# Patient Record
Sex: FEMALE | Race: WHITE | Employment: OTHER | URBAN - METROPOLITAN AREA
[De-identification: names, ages, dates, MRNs, and addresses within clinical notes are randomized per-mention and may not be internally consistent; named-entity substitution may affect disease eponyms.]

---

## 2017-01-03 DIAGNOSIS — E78.5 HYPERLIPIDEMIA LDL GOAL <100: Primary | ICD-10-CM

## 2017-01-03 RX ORDER — SIMVASTATIN 20 MG
20 TABLET ORAL AT BEDTIME
Qty: 30 TABLET | Refills: 0 | Status: SHIPPED | OUTPATIENT
Start: 2017-01-03 | End: 2017-02-15

## 2017-01-03 NOTE — TELEPHONE ENCOUNTER
Simvastatin     Last Written Prescription Date: 06/23/2016  Last Fill Quantity: 90, # refills: 1  Last Office Visit with [facility], P or [facility] prescribing provider: 12/21/2016   Next 5 appointments (look out 90 days)     Jan 24, 2017 11:00 AM   Return Visit with SELWYN Gaona CNP   [facility] ([facility])    [address]  [facility] [address]  [facility] [address]   [phone]                   CHOL      155   10/20/2015  HDL       58   10/20/2015  LDL       76   10/20/2015  LDL      149   10/29/2013  TRIG      107   10/20/2015  CHOLHDLRATIO      2.7   10/20/2015      Luan DYSON (R)

## 2017-01-04 ENCOUNTER — HOSPITAL ENCOUNTER (OUTPATIENT)
Dept: OCCUPATIONAL THERAPY | Facility: CLINIC | Age: 62
Setting detail: THERAPIES SERIES
End: 2017-01-04
Attending: NURSE PRACTITIONER
Payer: OTHER MISCELLANEOUS

## 2017-01-04 PROCEDURE — 97022 WHIRLPOOL THERAPY: CPT | Mod: GO | Performed by: OCCUPATIONAL THERAPIST

## 2017-01-04 PROCEDURE — 97110 THERAPEUTIC EXERCISES: CPT | Mod: GO | Performed by: OCCUPATIONAL THERAPIST

## 2017-01-04 PROCEDURE — 40000839 ZZH STATISTIC HAND THERAPY VISIT: Performed by: OCCUPATIONAL THERAPIST

## 2017-01-06 ENCOUNTER — HOSPITAL ENCOUNTER (OUTPATIENT)
Dept: OCCUPATIONAL THERAPY | Facility: CLINIC | Age: 62
Setting detail: THERAPIES SERIES
End: 2017-01-06
Attending: NURSE PRACTITIONER
Payer: OTHER MISCELLANEOUS

## 2017-01-06 ENCOUNTER — OFFICE VISIT (OUTPATIENT)
Dept: PALLIATIVE MEDICINE | Facility: CLINIC | Age: 62
End: 2017-01-06
Payer: OTHER MISCELLANEOUS

## 2017-01-06 DIAGNOSIS — G90.511 COMPLEX REGIONAL PAIN SYNDROME TYPE 1 OF RIGHT UPPER EXTREMITY: Primary | ICD-10-CM

## 2017-01-06 PROCEDURE — 97110 THERAPEUTIC EXERCISES: CPT | Mod: GO | Performed by: OCCUPATIONAL THERAPIST

## 2017-01-06 PROCEDURE — 40000839 ZZH STATISTIC HAND THERAPY VISIT: Performed by: OCCUPATIONAL THERAPIST

## 2017-01-06 PROCEDURE — 97022 WHIRLPOOL THERAPY: CPT | Mod: GO | Performed by: OCCUPATIONAL THERAPIST

## 2017-01-06 PROCEDURE — 96152 HC HEALTH AND BEHAVIOR INTERVENTION, INDIVIDUAL, EACH 15 MINUTES: CPT | Performed by: PSYCHOLOGIST

## 2017-01-06 NOTE — PROGRESS NOTES
[facility]    [facility] [place]  Behavioral Medicine Visit    Patient Name: Diana Buenrostro     YOB: 1955   Medical Record Number: [id number]  Date: 1/6/2016                SUBJECTIVE:  Session objective: Reports her pain has been up and down as has her mood. We spent time talking about possibilities for coping when patient let me know mostly she wanted to say things were hard right now. We agreed when meeting again we will start where she thinks she is before attempting to problem solve or consider possible ways to cope.    Cites example of  telling her he wanted to get vacation plans going and she was overwhelmed because things felt so uncertain. She is sad there is so much variability and appears to want validation her process with these questions in normal. I have let her know it is different for all and what she is experiencing now is not unusual    OBJECTIVE:              Length of Visit: 45 minutes                 Mental status: healthy, alert, moderate distress and fatigued    ASSESSMENT:   Axis I:  Complex regional pain syndrome type 1 of right upper extremity                    Axis II:   Dx deferred    Progress toward goals: fair.    Has continued to reach out but also has great reluctance and when she experiences a new sensation related to pain she tends to keep it to herself and dwells and has many difficult emotions              Pain Status: unchanged                      Emotional Status: variable              Medication / chemical use concerns: None    PLAN:    Next Appointment: Diana Buenrostro will schedule a follow-up appointment in 2-3 weeks.  Assignment: None  Objectives / interventions for next session: Continue with integrating pain modulation approaches, continue to normalize coping process      Pj Juarez MA, LP, [facility]  Pain Specialist  [facility] [place]

## 2017-01-12 ENCOUNTER — RADIOLOGY INJECTION OFFICE VISIT (OUTPATIENT)
Dept: PALLIATIVE MEDICINE | Facility: CLINIC | Age: 62
End: 2017-01-12
Payer: OTHER MISCELLANEOUS

## 2017-01-12 ENCOUNTER — RADIANT APPOINTMENT (OUTPATIENT)
Dept: RADIOLOGY | Facility: CLINIC | Age: 62
End: 2017-01-12
Attending: ANESTHESIOLOGY
Payer: COMMERCIAL

## 2017-01-12 VITALS — OXYGEN SATURATION: 100 % | DIASTOLIC BLOOD PRESSURE: 76 MMHG | SYSTOLIC BLOOD PRESSURE: 135 MMHG | HEART RATE: 71 BPM

## 2017-01-12 DIAGNOSIS — G90.511 COMPLEX REGIONAL PAIN SYNDROME TYPE 1 OF RIGHT UPPER EXTREMITY: Primary | ICD-10-CM

## 2017-01-12 DIAGNOSIS — G90.511 COMPLEX REGIONAL PAIN SYNDROME TYPE 1 OF RIGHT UPPER EXTREMITY: ICD-10-CM

## 2017-01-12 PROCEDURE — 64510 N BLOCK STELLATE GANGLION: CPT | Mod: RT | Performed by: ANESTHESIOLOGY

## 2017-01-12 ASSESSMENT — PAIN SCALES - GENERAL: PAINLEVEL: MODERATE PAIN (4)

## 2017-01-12 NOTE — NURSING NOTE
"Chief Complaint   Patient presents with     Pain       Initial /75 mmHg  Pulse 72 Estimated body mass index is 24.65 kg/(m^2) as calculated from the following:    Height as of 11/17/16: 1.753 m (5' 9\").    Weight as of 12/21/16: 75.751 kg (167 lb).  BP completed using cuff size: regular    Injection intake:    If this procedure is requiring IV sedation has patient been NPO for 6  Hours? NA    Is patient on coumadin, plavix or other prescribed blood thinner?   No    If patient is on coumadin was it held for 5 days?   NA    If patient is on plavix was it held for 7 days?    NA     Does patient take aspirin?  No    If this is for a cervical procedure and patient is on aspirin has it been held for 6 days?   NA    Any allergies to contrast dye, iodine, steroid and/or numbing medications?  NO    Is patient currently taking antibiotics or have an active infection?  NO    Does patient have a ? Yes       Is patient pregnant or breastfeeding?  NO    Are the vital signs normal?  Yes    Kellie Khoury CMA (AAMA)        "

## 2017-01-12 NOTE — PATIENT INSTRUCTIONS
[facility]   Procedure Discharge Instructions    Today you saw:  Dr. Gaurang Pena      You had an: R-sided Stellate ganglion block     Medications used:  Lidocaine   Bupivacaine   Dexamethasone Omnipaque      Stellate Ganglion Block:  You may have one or more of the following: difficulty swallowing, hoarseness, sore throat, mild swelling or bruising or the neck, drooping or redness of the eyelid on the side injected, a very small pupil on the side injected, weakness of the arm on the side injected.  These symptons should go away within 24 hours  If you experience chest pain, shortness of breath or problems breathing go to your local Emergency Room    Do not eat until the lump in your throat goes down (usually about 4 hours)  Do not drink hot drinks for the next 12 hours  You may take small sips of cool drinks or ice chips  Use a sling to protect your arm if needed          Be cautious when walking. Numbness and/or weakness in the lower extremities may occur up to 6-8 hours after the procedure due to effect of the local anesthetic    Do not drive for 6 hours. The effect of the local anesthetic could slow your reflexes.     You may resume your regular activities after 24 hours    Avoid strenuous activity for the first 24 hours    You may shower, however avoid swimming, tub baths or hot tubs for 24 hours following your procedure    You may have a mild to moderate increase in pain for several days following the injection.    It may take up to 14 days for the steroid medication to start working although you may feel the effect as early as a few days after the procedure.       You may use ice packs for 10-15 minutes, 3 to 4 times a day at the injection site for comfort    Do not use heat to painful areas for 6 to 8 hours. This will give the local anesthetic time to wear off and prevent you from accidentally burning your skin.     You may use anti-inflammatory medications (such as Ibuprofen or  Aleve or Advil) or Tylenol for pain control if necessary    If you were fasting, you may resume your normal diet and medications after the procedure    If you have diabetes, check your blood sugar more frequently than usual as your blood sugar may be higher than normal for 10-14 days following a steroid injection. Contact your doctor who manages your diabetes if your blood sugar is higher than usual    If you experience any of the following, call the pain center nursing line during work hours at [phone] or the after hours provider line at [phone]:  -Fever over 100 degree F  -Swelling, bleeding, redness, drainage, warmth at the injection site  -Progressive weakness or numbness in your legs or arms  -Loss of bowel or bladder function  -Unusual headache that is not relieved by Tylenol  -Unusual new onset of pain that is not improving      Phone #s:  Appointment line: [phone];  Nurse line: [phone]

## 2017-01-12 NOTE — PROGRESS NOTES
Pre procedure Diagnosis: CRPS of the right upper extremity, chronic pain syndrome  Post proceudure Diagnosis:Same  Procedure performed: Stellate ganglion block on the right at C7 under fluorsoscopic guidance, contrast controlled  Anesthesia: local  Complications: none  Operators: Gaurang Pena MD    INDICATIONS:    Diana Buenrostro is a 61 year old female with a h/o chronic right upper extremity pain with CRPS after a distal radius fracture.  Her last Stellate Ganglion Block was performed on 12/9/16 with significant improvement in the hypersensitivity, color, and temperature of the right wrist and hand.  She presents today for repeat Stellate Ganglion block.      Options, benefits and risks were discussed with the patient including bleeding, infection, hoarseness, droopy face/eye, swallowing problems, no pain relief, seizure, stroke, paralysis, nerve injury, spinal cord injury, spinal headache, spinal fluid leak, coma, death. Questions were answered to her satisfaction and she agrees to proceed. Voluntary informed consent was obtained and signed.  The patient's medical history, medications, and allergies were reviewed and reconciled.    Vitals:  /76 mmHg  Pulse 71  SpO2 100%  Medications reviewed?  Yes  Allergies reviewed?  yes  Pre-procedure pain:  5/10    PROCEDURE IN DETAIL:  After obtaining signed informed consent the patient was brought into the procedure suit and placed in a supine position on the procedure table. Patient's neck area was prepped and draped in the usual sterile fashion. The transverse process of C7 on the right side was identified under AP fluoroscopic guidance. Soft tissue of her neck were retracted using my left hand until palpation of transverse process was obtained. 1 ml of 1% lidocaine was injected at the needle entry point and needle tract using a 30 gauge 1 inch needle. Then a 22 gauge 2 inch stellate needle was inserted and slowly advanced under fluoroscopic guidance until  bony contact was made at the site where the transverse process meets the body of C7. Lateral fluoroscopic guidance was also obtained to confirm the needle depth and position. After negative aspiration for heme and CSF, 1 cc of Omnipaque contrast dye was injected utilizing real time fluoroscopy that was negative for vascular uptake and showed excellent spread of  contrast along the anterior aspect of the C7 and T1 vertebral bodies on the right.  Omnipaque wasted:  9 ml.      Then, after negative aspiration, a test dose was performed by injecting 1.5 ml of Lidocaine 1% with epinephrine which was negative for adverse effects.  Then, after repeated negative aspiration, 8 ml of a combination of Depomedrol 40 mg and 7 ml of 0.2% Ropivicaine was injected slowly and incrementally with frequent intermittent aspirations. The needle was then flushed with Lidocaine 1% as it was withdrawn, the patient's neck was cleansed and a sterile dressing was applied.  The patient was brought to the recovery area.     In the recovery area patient reported no significant pain relief but she had hyperemia of the right upper extremity and a positive Lili's syndrome which is an indication of a successful stellate ganglion block.     Patient's pre procedure  pain intensity score was 5/10 and post procedure pain intensity score was 5/10. Patient will continue monitoring progress and will be followed in the pain clinic by SELWYN Ferguson as scheduled.  The patient was then discharged in good condition with discharge instructions.    Follow up includes:  - post procedure nurse call at one week  - follow up in the pain clinic as scheduled    Gaurang Pena MD  [facility] [place]

## 2017-01-12 NOTE — MR AVS SNAPSHOT
After Visit Summary   1/12/2017    Diana Buenrostro    MRN: [id number]           Patient Information     Date Of Birth          1955        Visit Information        Provider Department      1/12/2017 2:15 PM Gaurang Tolentino MD [facility]        Care Instructions    [facility]   Procedure Discharge Instructions    Today you saw:  Dr. Gaurang Pena      You had an: R-sided Stellate ganglion block     Medications used:  Lidocaine   Bupivacaine   Dexamethasone Omnipaque      Stellate Ganglion Block:  You may have one or more of the following: difficulty swallowing, hoarseness, sore throat, mild swelling or bruising or the neck, drooping or redness of the eyelid on the side injected, a very small pupil on the side injected, weakness of the arm on the side injected.  These symptons should go away within 24 hours  If you experience chest pain, shortness of breath or problems breathing go to your local Emergency Room    Do not eat until the lump in your throat goes down (usually about 4 hours)  Do not drink hot drinks for the next 12 hours  You may take small sips of cool drinks or ice chips  Use a sling to protect your arm if needed          Be cautious when walking. Numbness and/or weakness in the lower extremities may occur up to 6-8 hours after the procedure due to effect of the local anesthetic    Do not drive for 6 hours. The effect of the local anesthetic could slow your reflexes.     You may resume your regular activities after 24 hours    Avoid strenuous activity for the first 24 hours    You may shower, however avoid swimming, tub baths or hot tubs for 24 hours following your procedure    You may have a mild to moderate increase in pain for several days following the injection.    It may take up to 14 days for the steroid medication to start working although you may feel the effect as early as a few days after the procedure.       You may use ice  packs for 10-15 minutes, 3 to 4 times a day at the injection site for comfort    Do not use heat to painful areas for 6 to 8 hours. This will give the local anesthetic time to wear off and prevent you from accidentally burning your skin.     You may use anti-inflammatory medications (such as Ibuprofen or Aleve or Advil) or Tylenol for pain control if necessary    If you were fasting, you may resume your normal diet and medications after the procedure    If you have diabetes, check your blood sugar more frequently than usual as your blood sugar may be higher than normal for 10-14 days following a steroid injection. Contact your doctor who manages your diabetes if your blood sugar is higher than usual    If you experience any of the following, call the pain center nursing line during work hours at [phone] or the after hours provider line at [phone]:  -Fever over 100 degree F  -Swelling, bleeding, redness, drainage, warmth at the injection site  -Progressive weakness or numbness in your legs or arms  -Loss of bowel or bladder function  -Unusual headache that is not relieved by Tylenol  -Unusual new onset of pain that is not improving      Phone #s:  Appointment line: [phone];  Nurse line: [phone]            Follow-ups after your visit        Your next 10 appointments already scheduled     Jan 13, 2017  9:00 AM   Treatment with Keri Ramsey OT   [facility] ([facility])    [address]  [facility] [address]   [phone]            Jan 18, 2017  9:00 AM   Treatment with Keri Ramsey OT   [facility] ([facility])    [address]  [facility] [address]   [phone]            Jan 19, 2017  1:00 PM   Return Visit with Pj Juarez LP   [facility] [place] ([facility] [place])    [address]  [place] [id number] [phone]            Jan  20, 2017  9:00 AM   Treatment with Keri Ramsey, OT   [facility] ([facility])    [address] [place] [address]  [address]  [facility] [address]   [phone]            Jan 24, 2017 11:00 AM   Return Visit with SELWYN Gaona CNP   [facility] ([facility])    [address]  [address] [address]  [facility] [address] [phone]              Who to contact     If you have questions or need follow up information about today's clinic visit or your schedule please contact [facility] directly at [phone].  Normal or non-critical lab and imaging results will be communicated to you by MyChart, letter or phone within 4 business days after the clinic has received the results. If you do not hear from us within 7 days, please contact the clinic through [employer] or phone. If you have a critical or abnormal lab result, we will notify you by phone as soon as possible.  Submit refill requests through [employer] or call your pharmacy and they will forward the refill request to us. Please allow 3 business days for your refill to be completed.          Additional Information About Your Visit        MyChart Information     [employer] gives you secure access to your electronic health record. If you see a primary care provider, you can also send messages to your care team and make appointments. If you have questions, please call your primary care clinic.  If you do not have a primary care provider, please call [phone] and they will assist you.        Care EveryWhere ID     This is your Care EveryWhere ID. This could be used by other organizations to access your [place] medical records  [id number]        Your Vitals Were     Pulse                   72            Blood Pressure from Last 3 Encounters:   01/12/17 120/75   12/21/16 121/73   12/13/16 143/84    Weight from Last 3 Encounters:   12/21/16 75.751 kg (167 lb)   11/30/16 78.019 kg (172 lb)    11/17/16 77.111 kg (170 lb)              Today, you had the following     No orders found for display       Primary Care Provider Office Phone # Fax #    SELWYN Cifuentes [facility] [phone]       [facility] [address] [facility] [facility] [address] [facility] [address]        Thank you!     Thank you for choosing [facility]  for your care. Our goal is always to provide you with excellent care. Hearing back from our patients is one way we can continue to improve our services. Please take a few minutes to complete the written survey that you may receive in the mail after your visit with us. Thank you!             Your Updated Medication List - Protect others around you: Learn how to safely use, store and throw away your medicines at www.disposemymeds.org.          This list is accurate as of: 1/12/17  2:16 PM.  Always use your most recent med list.                   Brand Name Dispense Instructions for use    diclofenac 1 % Gel topical gel    VOLTAREN    100 g    Apply 2-4 grams to affected area four times daily       gabapentin 300 MG capsule    NEURONTIN    180 capsule    600 mg TID       HYDROcodone-acetaminophen 5-325 MG per tablet    NORCO    20 tablet    1/2 to 1 tablet daily as needed for moderate to severe pain. #20 tabs to last 30 days.       levothyroxine 50 MCG tablet    SYNTHROID/LEVOTHROID    30 tablet    Take 50 mcg on even days, and 25 mcg (1/2 tab) on odd days. (Needs follow-up appointment for this medication)       lidocaine 5 % ointment    XYLOCAINE    100 g    Apply topically 4 times daily as needed for moderate pain       simvastatin 20 MG tablet    ZOCOR    30 tablet    Take 1 tablet (20 mg) by mouth At Bedtime (Needs fasting lab work)       VITAMIN C PO      Take 500 mg by mouth daily

## 2017-01-13 ENCOUNTER — HOSPITAL ENCOUNTER (OUTPATIENT)
Dept: OCCUPATIONAL THERAPY | Facility: CLINIC | Age: 62
Setting detail: THERAPIES SERIES
End: 2017-01-13
Attending: NURSE PRACTITIONER
Payer: OTHER MISCELLANEOUS

## 2017-01-13 PROCEDURE — 97110 THERAPEUTIC EXERCISES: CPT | Mod: GO | Performed by: OCCUPATIONAL THERAPIST

## 2017-01-13 PROCEDURE — 97022 WHIRLPOOL THERAPY: CPT | Mod: GO | Performed by: OCCUPATIONAL THERAPIST

## 2017-01-13 PROCEDURE — 40000839 ZZH STATISTIC HAND THERAPY VISIT: Performed by: OCCUPATIONAL THERAPIST

## 2017-01-18 ENCOUNTER — HOSPITAL ENCOUNTER (OUTPATIENT)
Dept: OCCUPATIONAL THERAPY | Facility: CLINIC | Age: 62
Setting detail: THERAPIES SERIES
End: 2017-01-18
Attending: NURSE PRACTITIONER
Payer: OTHER MISCELLANEOUS

## 2017-01-18 PROCEDURE — 97112 NEUROMUSCULAR REEDUCATION: CPT | Mod: GO | Performed by: OCCUPATIONAL THERAPIST

## 2017-01-18 PROCEDURE — 40000839 ZZH STATISTIC HAND THERAPY VISIT: Performed by: OCCUPATIONAL THERAPIST

## 2017-01-18 PROCEDURE — 97110 THERAPEUTIC EXERCISES: CPT | Mod: GO | Performed by: OCCUPATIONAL THERAPIST

## 2017-01-19 ENCOUNTER — OFFICE VISIT (OUTPATIENT)
Dept: PALLIATIVE MEDICINE | Facility: CLINIC | Age: 62
End: 2017-01-19
Payer: OTHER MISCELLANEOUS

## 2017-01-19 VITALS
WEIGHT: 170 LBS | BODY MASS INDEX: 25.09 KG/M2 | SYSTOLIC BLOOD PRESSURE: 146 MMHG | DIASTOLIC BLOOD PRESSURE: 87 MMHG | HEART RATE: 73 BPM

## 2017-01-19 DIAGNOSIS — G90.511 COMPLEX REGIONAL PAIN SYNDROME TYPE 1 OF RIGHT UPPER EXTREMITY: Primary | ICD-10-CM

## 2017-01-19 DIAGNOSIS — M62.838 MUSCLE SPASM: Primary | ICD-10-CM

## 2017-01-19 PROCEDURE — 96152 HC HEALTH AND BEHAVIOR INTERVENTION, INDIVIDUAL, EACH 15 MINUTES: CPT | Performed by: PSYCHOLOGIST

## 2017-01-19 PROCEDURE — 99214 OFFICE O/P EST MOD 30 MIN: CPT | Performed by: NURSE PRACTITIONER

## 2017-01-19 RX ORDER — METHOCARBAMOL 500 MG/1
500-1000 TABLET, FILM COATED ORAL 4 TIMES DAILY PRN
Qty: 120 TABLET | Refills: 1 | Status: SHIPPED | OUTPATIENT
Start: 2017-01-19 | End: 2017-08-01

## 2017-01-19 ASSESSMENT — PAIN SCALES - GENERAL: PAINLEVEL: SEVERE PAIN (6)

## 2017-01-19 NOTE — MR AVS SNAPSHOT
After Visit Summary   2017    Diana Buenrostro    MRN: [id number]           Patient Information     Date Of Birth          1955        Visit Information        Provider Department      2017 11:00 AM Mallory Mayo APRN CNP [facility]        Today's Diagnoses     Muscle spasm    -  1       Care Instructions    After Visit Instructions:     Thank you for coming to [facility] for your care. It is my goal to partner with you to help you reach your optimal state of health.     I am recommending multidisciplinary care at this time.  The focus of care will be to continue gradual rehabilitation and pain management with medication adjustments as needed.    Continue daily self-care, identifying contributing factors, and monitoring variations in pain level. Continue to integrate self-care into your life.      1. Schedule occupational/hand therapy visits  2. Schedule follow-up with SELWYN Ferguson NP-C in 4-6 weeks  3. Call Dr Curtis to have the swelling in your left wrist evaluated.   4. Procedures recommended: Call or send message in 2 weeks to discuss add'l block injections.    Interventional procedures  are done at our [place] and [place] locations.   5. Medication recommendations:   1. Methocarbamol 500 m-2 tablets up to 4 times daily for spasms    SELWYN Romo NP-C  [facility]    Contact information: [facility]    Please call if any side effects, questions, or concerns arise.    Nurse Triage line:  [phone]   Call this number with any questions or concerns. You may leave a detailed message anytime. Calls are typically returned Monday through Friday between 8 AM and 4:30 PM. We usually get back to you within 2 business days depending on the issue/request.    Scheduling number: [phone].  Call this number to schedule or change appointments.           Medication Refills Policy:    For non-narcotic medications, please your pharmacy directly to request a refill and the pharmacy will call the [facility] Center for authorization. Please allow 3-4 days for these refills.    For narcotic refills, call the nurse triage line and leave a message requesting your refill along with the name of the pharmacy that you use. Narcotic prescriptions will be mailed to your pharmacy or you may pick them up at the clinic.  Please call 7-10 days before your refill is due  The above policy allows adequate time so that you do not run out of medication.    No Show - Late Cancellation - Late Arrival Policy  We believe regular attendance is key to your success in our program.    Any time you are unable to keep your appointment we ask that you call us at  least 24 hours in advance to let us know. This will allow us to offer the appointment time to another patient. The following is our policy for missed appointments. This also applies to appointments cancelled with less than 24 hours notice.    You will receive a letter after missing your 1st and 2nd appointments without contacting the clinic before your scheduled appointment time.     After missing 3 appointments without calling first, we will cancel all of your future appointments at [facility].    At that point, you will not be able to resume services unless approved by your care team  We understand that unforseen circumstances arise, however, out of respect for all concerned and to provide this appointment to another patient, this policy will be enforced.    Please note that most follow up appointments are 30 minutes long. If you arrive late, your provider may not be able to see you for the entire 30 minutes. Please also note that if you arrive more than 15 minutes for any appointment, it may be rescheduled.                                   Follow-ups after your visit        Your next 10 appointments  already scheduled     Jan 19, 2017  1:00 PM   Return Visit with Pj Juarez LP   [facility] [place] ([facility] [place])    [address]  [place] [address] [phone]            Jan 20, 2017  9:00 AM   Treatment with Keri Ramsey OT   [facility] ([facility])    [address]  [address]  [place] [address] [phone]              Who to contact     If you have questions or need follow up information about today's clinic visit or your schedule please contact [facility] directly at [phone].  Normal or non-critical lab and imaging results will be communicated to you by MyChart, letter or phone within 4 business days after the clinic has received the results. If you do not hear from us within 7 days, please contact the clinic through [facility] or phone. If you have a critical or abnormal lab result, we will notify you by phone as soon as possible.  Submit refill requests through [facility] or call your pharmacy and they will forward the refill request to us. Please allow 3 business days for your refill to be completed.          Additional Information About Your Visit        [employer] [facility] Information     [facility] gives you secure access to your electronic health record. If you see a primary care provider, you can also send messages to your care team and make appointments. If you have questions, please call your primary care clinic.  If you do not have a primary care provider, please call [phone] and they will assist you.        Care EveryWhere ID     This is your Care EveryWhere ID. This could be used by other organizations to access your [facility] medical records  [id number]        Your Vitals Were     Pulse                   73            Blood Pressure from Last 3 Encounters:   01/19/17 146/87   01/12/17 135/76   12/21/16 121/73    Weight from Last 3 Encounters:   01/19/17 77.111 kg (170 lb)    12/21/16 75.751 kg (167 lb)   11/30/16 78.019 kg (172 lb)              Today, you had the following     No orders found for display         Today's Medication Changes          These changes are accurate as of: 1/19/17 11:28 AM.  If you have any questions, ask your nurse or doctor.               Start taking these medicines.        Dose/Directions    methocarbamol 500 MG tablet   Commonly known as:  ROBAXIN   Used for:  Muscle spasm   Started by:  Mallory Mayo APRN CNP        Dose:  500-1000 mg   Take 1-2 tablets (500-1,000 mg) by mouth 4 times daily as needed for muscle spasms   Quantity:  120 tablet   Refills:  1            Where to get your medicines      These [facility] [place] - [address]  [address], [place] [address]    Hours:  [facility] Phone:  [phone]    - methocarbamol 500 MG tablet             Primary Care Provider Office Phone # Fax #    Samantha SELWYN Roldan [phone]       [facility] [address] [place] [address]        Thank you!     Thank you for choosing [facility]  for your care. Our goal is always to provide you with excellent care. Hearing back from our patients is one way we can continue to improve our services. Please take a few minutes to complete the written survey that you may receive in the mail after your visit with us. Thank you!             Your Updated Medication List - Protect others around you: Learn how to safely use, store and throw away your medicines at www.disposemymeds.org.          This list is accurate as of: 1/19/17 11:28 AM.  Always use your most recent med list.                   Brand Name Dispense Instructions for use    diclofenac 1 % Gel topical gel    VOLTAREN    100 g    Apply 2-4 grams to affected area four times daily       gabapentin 300 MG capsule    NEURONTIN    180 capsule    600 mg TID        HYDROcodone-acetaminophen 5-325 MG per tablet    NORCO    20 tablet    1/2 to 1 tablet daily as needed for moderate to severe pain. #20 tabs to last 30 days.       levothyroxine 50 MCG tablet    SYNTHROID/LEVOTHROID    30 tablet    Take 50 mcg on even days, and 25 mcg (1/2 tab) on odd days. (Needs follow-up appointment for this medication)       lidocaine 5 % ointment    XYLOCAINE    100 g    Apply topically 4 times daily as needed for moderate pain       methocarbamol 500 MG tablet    ROBAXIN    120 tablet    Take 1-2 tablets (500-1,000 mg) by mouth 4 times daily as needed for muscle spasms       simvastatin 20 MG tablet    ZOCOR    30 tablet    Take 1 tablet (20 mg) by mouth At Bedtime (Needs fasting lab work)       VITAMIN C PO      Take 500 mg by mouth daily

## 2017-01-19 NOTE — PATIENT INSTRUCTIONS
After Visit Instructions:     Thank you for coming to [facility] for your care. It is my goal to partner with you to help you reach your optimal state of health.     I am recommending multidisciplinary care at this time.  The focus of care will be to continue gradual rehabilitation and pain management with medication adjustments as needed.    Continue daily self-care, identifying contributing factors, and monitoring variations in pain level. Continue to integrate self-care into your life.      1. Schedule occupational/hand therapy visits  2. Schedule follow-up with SELWYN Ferguson NP-C in 4-6 weeks  3. Call Dr Curtis to have the swelling in your left wrist evaluated.   4. Procedures recommended: Call or send message in 2 weeks to discuss add'l block injections.    Interventional procedures  are done at our [place] and [place] locations.   5. Medication recommendations:   1. Methocarbamol 500 m-2 tablets up to 4 times daily for spasms    SELWYN Romo, NP-C  [facility]    Contact information: [facility]    Please call if any side effects, questions, or concerns arise.    Nurse Triage line:  [phone]   Call this number with any questions or concerns. You may leave a detailed message anytime. Calls are typically returned Monday through Friday between 8 AM and 4:30 PM. We usually get back to you within 2 business days depending on the issue/request.    Scheduling number: [phone].  Call this number to schedule or change appointments.          Medication Refills Policy:    For non-narcotic medications, please your pharmacy directly to request a refill and the pharmacy will call the [facility] Management Center for authorization. Please allow 3-4 days for these refills.    For narcotic refills, call the nurse triage line and leave a message requesting your refill along with the name of the pharmacy that you use. Narcotic  prescriptions will be mailed to your pharmacy or you may pick them up at the clinic.  Please call 7-10 days before your refill is due  The above policy allows adequate time so that you do not run out of medication.    No Show - Late Cancellation - Late Arrival Policy  We believe regular attendance is key to your success in our program.    Any time you are unable to keep your appointment we ask that you call us at  least 24 hours in advance to let us know. This will allow us to offer the appointment time to another patient. The following is our policy for missed appointments. This also applies to appointments cancelled with less than 24 hours notice.    You will receive a letter after missing your 1st and 2nd appointments without contacting the clinic before your scheduled appointment time.     After missing 3 appointments without calling first, we will cancel all of your future appointments at [facility].    At that point, you will not be able to resume services unless approved by your care team  We understand that unforseen circumstances arise, however, out of respect for all concerned and to provide this appointment to another patient, this policy will be enforced.    Please note that most follow up appointments are 30 minutes long. If you arrive late, your provider may not be able to see you for the entire 30 minutes. Please also note that if you arrive more than 15 minutes for any appointment, it may be rescheduled.

## 2017-01-19 NOTE — Clinical Note
[facility]  [address]  [address]  [facility] [address]  [phone]    2017    REPORT OF WORK ABILITY  [facility]  [address]  [address]  [facility] [id number] [phone]  PATIENT DATA    Employee Name: Diana Buenrostro      : 1955      #: [id number]    Work related injury: Yes  Employer at time of injury: [employer]  Employer contact & phone: [phone]  Employed elsewhere? No  Workers' Compensation Carrier/Managed Care Plan:  [employer]    Today's date: 17  Date of injury: 12/8/15  Date of first visit: 16    PROVIDER EVALUATION: Please fill in as needed.  Please give copy to employee for employer.  1. Diagnosis: Complex Regional Pain Syndrome affecting right upper extremity  2. Treatment: Medication, nerve blocks (patient will call to discuss in 2 weeks),  OT/PT.  3. Medication: Gabapentin, lidoderm patches, diclofenac gel, Norco, Methocarbamol     NOTE: When ordering a medication, [place] Rules require Work Comp or WC on prescriptions.  4.  Continue with hand therapy  5. Return to work date: no work at this time. Will reevaluate at next clinic visit in 4-6 weeks.     RESTRICTIONS: Unlimited unless listed.  Restrictions apply to home and leisure also.  If work restrictions is not available, the employee is totally disabled.    Maximum Medical Improvement (Date): Unknown  Any Permanent Partial Disability? Deferred to future exam/consult.    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

## 2017-01-19 NOTE — PROGRESS NOTES
[facility]    CHIEF COMPLAINT: Wrist pain     INTERVAL HISTORY:  Last seen on 12/13/16.        Recommendations/plan at the last visit included:  1. Continue pain psychology visits  2. Continue occupational therapy visits  3. Schedule follow-up with SELWYN Ferguson NP-C in 4-6 weeks  4. Procedures recommended: Stellate ganglion block. Schedule the week of 12/26/16. Let Mallory know if you decide to cancel this injection.     Interventional procedures  are done at our [place] and [place] locations.   5. Medication recommendations:No changes.     Since her last visit, Diana Buenrostro reports:  - Does not think that the last stellate ganglion block was much help. Has noticed more muscle spasms.   - Has area of swelling at base of left hand. Has been doing therapy on B hands/arms, now wearing compression gloves bilaterally that were given by OT.     Pain Information:   Pain quality: Aching, Burning, Exhausting, Miserable, Nagging, Numb, Sharp, Stabbing, Tender, Throbbing, Tiring and Unbearable    Pain timing: Constant     Pain rating: intensity ranges from 4/10 to 8/10, and averages 5/10 on a 0-10 scale.   Aggravating factors include: Use of the arms, cold weather   Relieving factors include: Warm      Annual Controlled Substance Agreement/UDS due date: 10/2017    CURRENT RELEVANT PAIN MEDICATIONS:   Lidocaine 5% ointment  Diclofenac gel apply to affected area 3-4 times daily  Norco 5/325 mg: Half to 1 tablet daily as needed for severe pain. #20 tablets to last 30 days    Patient is using the medication as prescribed:  YES  Is your medication helpful? YES, but doesn't last very long   Medication side effects? denies any problems      Previous Pain Relevant Medications: (H--helped; HI--Helped initially; SWH--Somewhat helpful; NH--No help; W--worse; SE--side effects; ?--Unsure if helpful)   NOTE: This medication information taken from patient's intake form, not medical records.    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         Medications:  Current Outpatient Prescriptions   Medication Sig Dispense Refill     simvastatin (ZOCOR) 20 MG tablet Take 1 tablet (20 mg) by mouth At Bedtime (Needs fasting lab work) 30 tablet 0     gabapentin (NEURONTIN) 300 MG capsule 600 mg  capsule 1     HYDROcodone-acetaminophen (NORCO) 5-325 MG per tablet 1/2 to 1 tablet daily as needed for moderate to severe pain. #20 tabs to last 30 days. 20 tablet 0     levothyroxine (SYNTHROID, LEVOTHROID) 50 MCG tablet Take 50 mcg on even days, and 25 mcg (1/2 tab) on odd days. (Needs follow-up appointment for this medication) 30 tablet 0     diclofenac (VOLTAREN) 1 % GEL Apply 2-4 grams to affected area four times daily 100 g 1     lidocaine (XYLOCAINE) 5 % ointment Apply topically 4 times daily as needed for moderate pain 100 g 1     Ascorbic Acid (VITAMIN C PO) Take 500 mg by mouth daily         Review of Systems: A 10-point review of systems was negative, with the exception of chronic pain issues.      Social History: Reviewed; unchanged from initial consultation.      Family history: Reviewed; unchanged from initial consultation.     PHYSICAL EXAM    Filed Vitals:    01/19/17 1047   BP: 146/87   Pulse: 73   Weight: 77.111 kg (170 lb)       Constitutional: healthy, alert and mild distress, tearful  HEENT: Head atraumatic, normocephalic. Eyes without conjunctival injection or jaundice. Neck supple. No obvious neck masses.  Skin: No rash, lesions, or petechiae  of exposed skin.   Extremities: Peripheral pulses intact. No clubbing, cyanosis, or edema. Affected extremity is cooler than opposite extremity. No coloration difference today. Diminished strength.  Psychiatric/mental status: Alert, without lethargy or stupor. Appropriate affect. Mood anxious, tearful at times  Neurologic exam:  CN:  Cranial nerves 2-12 are grossly normal.  Motor:  3/5 RUE, 5/5 LUE    Sensory exam:   Light touch: Painful in right upper extremity    RUE allodynia    DIRE Score for ongoing opioid management is calculated as follows:    Diagnosis = 2 pts (slowly progressive; moderate pain/objective findings)    Intractability = 3 pts (patient fully engaged but inadequate response to treatments)    Risk        Psych = 3 pts (no significant personality dysfunction/mental illness; good communication with clinic)         Chem Hlth = 3 pts (no history of chemical dependency; not drug-focused)       Reliability = 3 pts (highly reliable with meds, appointments, treatments)       Social = 3 pts (supportive family/close relationships; involved in work/school; no isolation)       (Psych + Chem hlth + Reliability + Social) = 17    Efficacy = 2 pts (moderate benefit/function; low med dose; too early/not tried meds)    DIRE Score = 19        7-13: likely NOT suitable candidate for long-term opioid analgesia       14-21: may be a suitable candidate for long-term opioid analgesia    DIAGNOSTIC TESTS:  Imaging Studies:   No new imaging    Assessment:   1.  CRPS affecting the right upper extremity    Diana presents in the company of her [facility] representative. I'm concerned about the CRPS and laterality jumped to the left versus overuse of the left upper extremity. She does have an area of swelling on her wrist which I would like to have her see Dr. Curtis to evaluate. She is quite apprehensive about continuing stellate ganglion blocks as she is not sure she is getting enough relief from them. We did talk about the  increase in her pain and symptoms when she took a break from doing injections. She would like to think about whether or not she will continue and she'll get back to me. If she wants to continue I will order more blocks with Dr. Jenn Pena. She notes some increased spasms in her right arm and I have ordered methocarbamol 500 mg 1-2 tablets q.i.d. p.r.n. spasm/pain. Continue occupational and hand therapy visits in addition to the work she is doing on her own at home. I will see her back in one month    Plan:    Diagnosis reviewed, treatment option addressed, and risk/benifits discussed.  Self-care instructions given.  I am recommending a multidisciplinary treatment plan to help this patient better manage pain.      1. Schedule occupational/hand therapy visits  2. Schedule follow-up with SELWYN Ferguson, NPTalaC in 4-6 weeks  3. Call Dr Curtis to have the swelling in your left wrist evaluated.   4. Procedures recommended: Call or send message in 2 weeks to discuss add'l block injections.     Interventional procedures  are done at our [place] and [place] locations.   5. Medication recommendations:   1. Methocarbamol 500 m-2 tablets up to 4 times daily for spasms    Total time spent face to face was 30 minutes and more than 50% of face to face time was spent in counseling and/or coordination of care regarding the diagnosis and recommendations above.      SELWYN Romo, NP-C   [facility]

## 2017-01-20 ENCOUNTER — TELEPHONE (OUTPATIENT)
Dept: PALLIATIVE MEDICINE | Facility: CLINIC | Age: 62
End: 2017-01-20

## 2017-01-20 ENCOUNTER — HOSPITAL ENCOUNTER (OUTPATIENT)
Dept: OCCUPATIONAL THERAPY | Facility: CLINIC | Age: 62
Setting detail: THERAPIES SERIES
End: 2017-01-20
Attending: NURSE PRACTITIONER
Payer: OTHER MISCELLANEOUS

## 2017-01-20 PROCEDURE — 97022 WHIRLPOOL THERAPY: CPT | Mod: GO | Performed by: OCCUPATIONAL THERAPIST

## 2017-01-20 PROCEDURE — 97110 THERAPEUTIC EXERCISES: CPT | Mod: GO | Performed by: OCCUPATIONAL THERAPIST

## 2017-01-20 PROCEDURE — 40000839 ZZH STATISTIC HAND THERAPY VISIT: Performed by: OCCUPATIONAL THERAPIST

## 2017-01-20 NOTE — TELEPHONE ENCOUNTER
Patient had a Stellate ganglion block on the right at C7 under fluorsoscopic guidance, contrast controlled on 1/12/17.  Called patient for an update.        Left message that we were calling for an update about how she was doing after the injection.  LM that if she has any problems or questions to call the nurse line at [phone].

## 2017-01-20 NOTE — PROGRESS NOTES
01/20/17 0500   Notes   Note Type Progress Note;Other   Providers   Providers Keri Ramsey, LUCIANOR/L, CHT   Referring Physician Mallory Mayo CNP   Reporting Period   Reporting period from 12/08/16   Reporting period to 01/20/17   General Information   Rxs Authorized 24   Rxs Used 20   Medical Diagnosis Right CRPS   Orders Evaluate And Treat As Indicated   Insurance WC   Start Of Care Date 10/26/16   Onset date of current episode/exacerbation 12/08/15   Surgical procedure ORIF   Date of surgery 12/10/15   Date for Next MD Appointment 11/14/16  (shot on 14th , MD on 15th)   Other pertinent information January 12 th next injection scheduled   Subjective Measures   Subjective Went to MD yesterday who wants patient to continue with therapy. She will be seeing Dr. Curtis for left wrist issues. Patient feels that coming into therapy is helpful for her.. Patient relates that she has been having more muscle spasm that increase her pain but overall feels therapy has been helpful   Initial Pain level 10/10  (at worst and 3 at best)   Current Pain level 7/10  (at worst and 4 at best)   Patient Reported % Functional Improvement 20% in the last month   Functional Improvement Reported Self care activities  (hard to qualify as everyday is so different)   Objective Measures   Objective Measures Strength;Objective Measure 1;Objective Measure 2;Objective Measure 3   Edema   Location (anatomical) DWC   Location Right;Left   Edema Comments 16.0,16.5   ROM   ROM Comments full ROM   Strength   Location Right;Left    10# ( Was 15#) left 20# was 42# and prior to that in the 80's   Maya Pinch 6# (was 4#),10#   Lateral Pinch 8# ( was 10#), left 10#   Objective Measure 1   Objective Measure hypersensitivity wrist   Details mod to severe   Objective Measure 2   Objective Measure sudomotor   Details hot  and sweaty on both hands today.   Objective Measure 3   Details left radial wrist swelling   Modalities   Modalities Fluidotherapy    Fluidotherapy -Duration 10 min  (5 min)   Skilled Interventions To Increase Tissue Extensibility;To Increase Tissue Excursion;To Decrease Hypersensitivity   Airflow low   Temperature 108   Set Up with gentle ex   Therapeutic Exercise   Skilled Interventions (verbal and physical cuing)   Minutes of Treatment 10   Other Exer/Activities/Educ   Other Exer/Activities/Educ - All exercises are part of HEP unless otherwise noted Exercise 2;Exercise 1;Exercise 3   Description 1 AROM- wrist , thumb and fingers all planes   Exercise 2 Pennies in light putty   Description 2 14 in - 7 out   Hand Goals   Hand Goals Household Chores;Sports/Recreation;Hygiene/Toileting;Sleeping   Hygiene/Toileting   Current Functional Task Brushing/combing hair  (washing hair)   Previous Performance Level Moderate limitations   Current Performance Level Moderate difficulty   Goal Target Task (wash hair)   Goal Target Performance Level No difficulty   Due Date 01/09/17   If goal not met, Why? progressing   Household Chores   Current Functional Task Carrying;Gripping   Previous Performance Level Severe limitations   Current Performance Level Severe difficulty   Goal Target Task Carry a shopping bag;Open a tight or new jar   Goal Target Performance Level Mild difficulty   Due Date 02/09/17   If goal not met, Why? progressing   Sleeping   Current Functional Task Sleeping through the night   Previous Performance Level No difficulty   Current Performance Level Moderate difficulty   Goal Target Task (to sleep 7-8 hours a night without waking)   Goal Target Performance Level Mild difficulty   Due Date 03/14/17   If goal not met, Why? progressing   Sports/Recreation   Current Functional Task Gripping   Previous Performance Level Unable   Current Performance Level Severe difficulty   Goal Target Task (to hold onto treadmill)   Goal Target Performance Level Mild difficulty   Due Date 03/07/17   If goal not met, Why? progressing   Assessment   Clinical  Impression(s) Comments Patient is making slow progress towards goals. Left hand being more involved and weaker and painful.. She has very limited tolerance to activity and can benefit from continued hand therapy to improve endurance and strength to hep motivate patient to perform to the best of her ability   Response to Therapy: Lack of Improvement Strength   Response to Therapy: Improvements Pain;Flexibility   Education   Learner Patient   Readiness Eager   Method Booklet/handout;Explanation;Demonstration   Response Verbalizes understanding;Demonstrates understanding   Plan   Home program stretching, weight bearing, desensitization, mirror therapy,  strengthening, functional use   Plan Will continue to see patient for the next 6 weeks- 2x/week utilizing modalities and ex as appropriate to reach goals.   Total Session Time   Total Session Time (In Minutes) 20   Keri Ramsey OTR/L CHT  Occupational Therapist, Certified Hand Therapist

## 2017-01-23 ENCOUNTER — HOSPITAL ENCOUNTER (OUTPATIENT)
Dept: OCCUPATIONAL THERAPY | Facility: CLINIC | Age: 62
Setting detail: THERAPIES SERIES
End: 2017-01-23
Attending: NURSE PRACTITIONER
Payer: OTHER MISCELLANEOUS

## 2017-01-23 PROCEDURE — 97022 WHIRLPOOL THERAPY: CPT | Mod: GO | Performed by: OCCUPATIONAL THERAPIST

## 2017-01-23 PROCEDURE — 40000839 ZZH STATISTIC HAND THERAPY VISIT: Performed by: OCCUPATIONAL THERAPIST

## 2017-01-23 PROCEDURE — 97110 THERAPEUTIC EXERCISES: CPT | Mod: GO | Performed by: OCCUPATIONAL THERAPIST

## 2017-01-26 ENCOUNTER — RADIANT APPOINTMENT (OUTPATIENT)
Dept: GENERAL RADIOLOGY | Facility: CLINIC | Age: 62
End: 2017-01-26
Attending: ORTHOPAEDIC SURGERY
Payer: COMMERCIAL

## 2017-01-26 ENCOUNTER — OFFICE VISIT (OUTPATIENT)
Dept: ORTHOPEDICS | Facility: CLINIC | Age: 62
End: 2017-01-26
Payer: OTHER MISCELLANEOUS

## 2017-01-26 VITALS — HEIGHT: 69 IN | WEIGHT: 168.4 LBS | BODY MASS INDEX: 24.94 KG/M2 | RESPIRATION RATE: 16 BRPM

## 2017-01-26 DIAGNOSIS — M25.532 LEFT WRIST PAIN: ICD-10-CM

## 2017-01-26 DIAGNOSIS — M67.432 GANGLION CYST OF VOLAR ASPECT OF LEFT WRIST: Primary | ICD-10-CM

## 2017-01-26 PROCEDURE — 73110 X-RAY EXAM OF WRIST: CPT | Mod: LT

## 2017-01-26 PROCEDURE — 99213 OFFICE O/P EST LOW 20 MIN: CPT | Performed by: ORTHOPAEDIC SURGERY

## 2017-01-26 ASSESSMENT — PAIN SCALES - GENERAL: PAINLEVEL: MODERATE PAIN (5)

## 2017-01-26 NOTE — NURSING NOTE
"Chief Complaint   Patient presents with     Surgical Followup     WORK COMP. Left wrist/base of thumb pain. Onset: November 2016. Patient has been experiencing pain in the left arm. She has been seeing pain managment and the doctor thinks that it could be over use from not being able to use her right arm. Her left arm has decreased strength. She has been going to PT for bilateral wrist. Pain is at the base of the thumb and into the wrist. Pain with lifting, anything physical.        Initial Resp 16  Ht 1.753 m (5' 9\")  Wt 76.386 kg (168 lb 6.4 oz)  BMI 24.86 kg/m2 Estimated body mass index is 24.86 kg/(m^2) as calculated from the following:    Height as of this encounter: 1.753 m (5' 9\").    Weight as of this encounter: 76.386 kg (168 lb 6.4 oz).  BP completed using cuff size: NA (Not Taken)  Nemo Siddiqui Certified Medical Assistant     "

## 2017-01-26 NOTE — PROGRESS NOTES
Chief Complaint:   Chief Complaint   Patient presents with     Surgical Followup     WORK COMP. Left wrist/base of thumb pain. Onset: 12/8/15, under the prior injury of right arm. Pain in left wrist started in 11/2016. Patient has been experiencing pain in the left arm. She has been seeing pain managment and the doctor thinks that it could be over use from not being able to use her right arm. Her left arm has decreased strength. She has been going to PT for bilateral wrist. Pain is at the base of the thumb and into the wrist. Pain with lifting, anything physical.         HPI: Diana Buenrostro is a 61 year old female, right -hand dominant, who presents for evaluation and management of a left wrist pain, no known injury. Pain has been present since 11/2016. She relates it to having to primarily use her left upper extremity, given right upper extremity fracture and development of CRPS,  over a year a go, under the prior injury of the right arm. Left wrist pain is located in the left wrist, base of thumb, and into the arm. It is aggravated with lifting, rated a 5/10. She has had weakness now associated with the pain. She has been going to physical therapy for bilateral wrists. Her physical therapist states that the left arm is getting weaker. She has just received a pair of physical therapy gloves. It does provide relief for the left arm when driving, however not the right. She is still wearing a brace for the right wrist. Swollen mass over the volar aspect of the wrist. [facility], who presents with the patient today, has a concern on whether the pain is CRPS.      She reports having mild-moderate  pain/discomfort around the left wrist site. She denies associated numbness or tingling. She denies any other injuries to her left upper extremity.     History right upper extremity distal radius fracture, open-reduction, internal fixation, development of CRPS.    Symptoms: moderate pain, swelling.  Location of symptoms: left wrist,  base of thumb.  Pain severity: 5/10  Pain quality: aching  Frequency of symptoms: frequently  Aggravating factors: with movement, palpation.  Relieving factors: at rest.    Previous treatment: none    Past medical history:  has a past medical history of Anxiety state, unspecified; Unspecified sleep apnea; Unspecified hearing loss; Urinary tract infection, site not specified; Unspecified allergic alveolitis and pneumonitis; Depressive disorder, not elsewhere classified; Insomnia, unspecified; Other specified anemias; Chronic kidney disease (CKD) (11/28/2008); and PONV (postoperative nausea and vomiting). She also has no past medical history of Basal cell carcinoma, Malignant melanoma (H), or Squamous cell carcinoma (H).   Patient Active Problem List    Diagnosis Date Noted     Complex regional pain syndrome type 2 of right upper extremity 08/29/2016     Priority: Medium     Closed fracture of distal end of right radius 04/27/2016     Priority: Medium     Transient visual loss 08/26/2014     Priority: Medium     Class: Acute     Kaleidoscope alterations in the field of vision of both eyes.  8/26x3, 8/30, 8/31, 9/12, 9/15, 9/16, 9/21 to date.       Calculus of gallbladder with other cholecystitis, without mention of obstruction 04/03/2014     Priority: Medium     Advanced directives, counseling/discussion 11/07/2012     Priority: Medium     Discussed advance care planning with patient; however, patient declined at this time. 11/7/2012        Hypothyroidism 03/17/2011     Priority: Medium     Sarcoidosis (H) 11/12/2010     Priority: Medium     HYPERLIPIDEMIA LDL GOAL <100 10/31/2010     Priority: Medium     Pulmonary nodule 09/28/2010     Priority: Medium     Chronic kidney disease (CKD) 11/28/2008     Priority: Medium      3/2/2006 cr 1.11  6/13/06 cr 0.94  6/15/06 cr 1.47  8/2006 cr 1.30    IMO update changed this record. Please review for accuracy       Kidney donor 04/25/2007     Priority: Medium     Needs yearly Ua  and creatinine           Past surgical history:  has past surgical history that includes surgical history of -  (6/14/2006); hysterectomy, vaginal (2000); laparoscopic wedge resection of right lung (2010); Laparoscopic cholecystectomy (4/9/2014); and Arthroscopy knee with medial meniscectomy (Right, 1/22/2015).     Medications:    Current Outpatient Prescriptions   Medication Sig Dispense Refill     methocarbamol (ROBAXIN) 500 MG tablet Take 1-2 tablets (500-1,000 mg) by mouth 4 times daily as needed for muscle spasms 120 tablet 1     simvastatin (ZOCOR) 20 MG tablet Take 1 tablet (20 mg) by mouth At Bedtime (Needs fasting lab work) 30 tablet 0     gabapentin (NEURONTIN) 300 MG capsule 600 mg  capsule 1     HYDROcodone-acetaminophen (NORCO) 5-325 MG per tablet 1/2 to 1 tablet daily as needed for moderate to severe pain. #20 tabs to last 30 days. 20 tablet 0     levothyroxine (SYNTHROID, LEVOTHROID) 50 MCG tablet Take 50 mcg on even days, and 25 mcg (1/2 tab) on odd days. (Needs follow-up appointment for this medication) 30 tablet 0     diclofenac (VOLTAREN) 1 % GEL Apply 2-4 grams to affected area four times daily 100 g 1     lidocaine (XYLOCAINE) 5 % ointment Apply topically 4 times daily as needed for moderate pain 100 g 1     Ascorbic Acid (VITAMIN C PO) Take 500 mg by mouth daily          Allergies:     Allergies   Allergen Reactions     Codeine GI Disturbance     Serzone [Nefazodone Hydrochloride] Nausea and Vomiting     After 1 dose        Family History: family history includes Arthritis in her mother; CANCER in her father; CANCER (age of onset: 40) in her paternal grandmother; DIABETES in her father, mother, and sister; Genitourinary Problems in her sister; HEART DISEASE in her mother and sister; Hypertension in her mother; Lipids in her mother and sister. There is no history of Family History Negative.     Social History:  reports that she quit smoking about 22 years ago. She has never used smokeless  "tobacco. She reports that she does not drink alcohol or use illicit drugs.    Review of Systems:  ROS: 10 point ROS neg other than the symptoms noted above in the HPI and past medical history.    Physical Exam  GENERAL APPEARANCE: healthy, alert, no distress. Accompanied by QRC  SKIN: no suspicious lesions or rashes  NEURO: mentation intact and speech normal  PSYCH:  mentation appears normal and affect normal. Not anxious.  RESPIRATORY: No increased work of breathing.    Resp 16  Ht 1.753 m (5' 9\")  Wt 76.386 kg (168 lb 6.4 oz)  BMI 24.86 kg/m2     left HAND / WRIST EXAM:    Skin intact. No abnormal skin discoloration, erythema or ecchymosis.   Degenerative changes of the interphalangeal joints.   Normal color and tone.  Palpable mass located over volar-radial wrist, adjacent to radial artery  Mass does transiluminate with light.  Mass tender to palpation. Fluctuant.  Negative Tinel's over mass.  There is mild swelling in the volar-radial wrist.  There is moderate tenderness over the distal radius. Mild tenderness over the carpometacarpal joint of the thumb.   There is no ecchymosis.  There is no erythema of the surrounding skin.  There is no maceration of the skin.  There is no deformity in the area.  Intact extensors. No extensor lag.    Special tests: Finkelsteins: equivocal.    1st carpometacarpal crank and grind: crank: positive, grind: positive    Intact sensation light touch median, radial, ulnar nerves of the hand  Intact sensation to the radial and ulnar digital nerves of the fingers, as well as the finger tips.  Intact epl fpl fdp edc wrist flexion/extension biceps/triceps deltoid  Brisk capillary refill to all fingers.   Palpable radial pulse, 2+.  Jose's test: good collateral ulnar flow    X-rays:  3 views left wrist from 1/26/2017 were reviewed in clinic today. On my review, no obvious fractures or other bony abnormalities. Mild thumb carpometacarpal degenerative changes.    Assessment: 61 year old " female with left wrist pain, volar ganglion cyst.    Plan:  * Reviewed imaging with patient. Also, clinical exam findings.   * pain likely related to the mass volar-radial wrist consistent with a ganglion cyst.  * I do not suspect CRPS at this time.  * may also have some underlying mild dequervains, carpometacarpal arthritis, but appears most related to the ganglion.    Discussed with patient that the mass is consistent with ganglion cyst, a common, benign tumor of the upper extremity. Less likely another type of tumor or neoplasm. Treatment options include: observation if asymptomatic or minimal symptoms, activity modification, splint, aspiration with cortisone injection (risks of recurrence > 50%), or surgical excision (risk of recurrence < 5-10%). Risks and benefits of each discussed in detail.    * in particular, risks of surgery include, but not limited to: bleeding, infection, pain, scar, damage to adjacent structures (nerves, vessels, bone, cartilage, tendons, ligaments), temporary versus permanent nerve injury, failure to relieve symptoms, recurrence of symptoms or recurrence of the mass, stiffness, need for further surgery, risks of anesthesia, blood clots, death.  * in her case, would be at risk of developing CRPS ,given prior and current CRPS right wrist.    * at this time, she'd rather not pursue any treatment other than a wrist brace.d  * I think a wrist brace, like the one she has for her right wrist from her therapist, would be a good choice at this time  * could consider referral for an U/S guided cyst aspiration/injection in future as needed.  * rest, ice, nsaids, activity modification, wrist brace ( to get from her therapist)    * return to clinic as needed.    This document serves as a record of the services and decisions personally performed and made by Benny Curtis MD. It was created on his behalf by Zenaida Olguin, a trained medical scribe. The creation of this document is based the provider's  statements to the medical scribe.    Angie Olguin 5:17 PM 1/26/2017       Benny Curtis M.D., M.S.  Dept. of Orthopaedic Surgery  [facility]

## 2017-01-26 NOTE — PATIENT INSTRUCTIONS
Please remember to call and schedule a follow up appointment if one was recommended at your earliest convenience.  Orthopedics CLINIC HOURS TELEPHONE NUMBER   Dr. Ever Chester  Certified Medical Assistant   Monday & Wednesday   8am - 5pm  Thursday 1pm - 5pm  Friday 8am -11:30am Specialty schedulers:   [phone] to schedule your surgery.  Main Clinic:   [phone] to make an appointment with any provider.    Urgent Care locations:    [facility] Monday-Friday Closed  Saturday-Sunday 9am-5pm      Monday-Friday 12pm - 8pm  Saturday-Sunday 9am-5pm [phone]     If SURGERY has been recommended, please call our Specialty Schedulers at [phone] to schedule your procedure.    If you need a medication refill, please contact your pharmacy. Please allow 3 business days for your refill to be completed.    If an MRI or CT scan has been recommended, please call [facility] Schedulers at [phone] to schedule your appointment.  Use [employer] (secure e-mail communication and access to your chart) to send a message or to make an appointment. Please ask how you can sign up for [employer].  Your care team's suggested websites for health information:   [email or web address] : Up to date and easily searchable information on multiple topics.   [email or web address] : [place] dept of heat, public health issues in [place], N1N1

## 2017-01-26 NOTE — PROGRESS NOTES
[facility]   [facility] [place]  Behavioral Medicine Visit    Patient Name: Diana Buenrostro    YOB: 1955   Medical Record Number: [id number]  Date: 1/26/2017                SUBJECTIVE:  Session objective: Reports she feels more distressed. She acknowledges she does not like to admit she is a worrier or that she needs any outside help but notes that she is consumed by thoughts of pain, worries about the future. She notes she has been dishonest with herself and that until recently she has just wanted her pain to go away or worries that she cannot tolerate the unknown associated with her future.     We did relaxation practice and she admits she is not comfortable doing it in the office, feels self conscious. Notes she did get some benefit (reduced tension in her shoulders) but wants to take home these methods and practice at home.         OBJECTIVE:   Length of Visit: 60 minutes      Mental status: healthy, alert and moderate distress    ASSESSMENT:   Axis I: Complex Regional Pain Syndrome  Axis II: Dx deferred    Progress toward goals: fair.      Pain Status: unchanged    Emotional Status: worsened   Medication / chemical use concerns: None    PLAN:   Next Appointment: Diana Buenrostro will schedule a follow-up appointment in 2 weeks.  Assignment: review relaxation material, practice techniques notice for improved calming  Objectives / interventions for next session: expand skill set    Pj Juarez MA, LP, [facility]  Pain Specialist  [facility] [place]

## 2017-01-27 ENCOUNTER — HOSPITAL ENCOUNTER (OUTPATIENT)
Dept: OCCUPATIONAL THERAPY | Facility: CLINIC | Age: 62
Setting detail: THERAPIES SERIES
End: 2017-01-27
Attending: NURSE PRACTITIONER
Payer: OTHER MISCELLANEOUS

## 2017-01-27 PROCEDURE — 97760 ORTHOTIC MGMT&TRAING 1ST ENC: CPT | Mod: GO | Performed by: OCCUPATIONAL THERAPIST

## 2017-01-27 PROCEDURE — 97112 NEUROMUSCULAR REEDUCATION: CPT | Mod: GO | Performed by: OCCUPATIONAL THERAPIST

## 2017-01-27 PROCEDURE — 40000839 ZZH STATISTIC HAND THERAPY VISIT: Performed by: OCCUPATIONAL THERAPIST

## 2017-02-02 ENCOUNTER — MYC MEDICAL ADVICE (OUTPATIENT)
Dept: PALLIATIVE MEDICINE | Facility: CLINIC | Age: 62
End: 2017-02-02

## 2017-02-02 DIAGNOSIS — G90.511 COMPLEX REGIONAL PAIN SYNDROME TYPE 1 OF RIGHT UPPER EXTREMITY: Primary | ICD-10-CM

## 2017-02-02 DIAGNOSIS — G57.71 COMPLEX REGIONAL PAIN SYNDROME TYPE 2 OF RIGHT LOWER EXTREMITY: Primary | ICD-10-CM

## 2017-02-02 NOTE — TELEPHONE ENCOUNTER
Mallory, I will go ahead with another block. Will the office call me to set that up when it has been approved? Have been a little more down this past week. Thinking it is a combination between the left wrist and the sensitivity. Glad to be seeing  tomorrow.     Thanks, Diana

## 2017-02-02 NOTE — TELEPHONE ENCOUNTER
Received fax request from [facility] pharmacy requesting refill(s) for Gabapentin    Last refilled on 1/3/17    Pt last seen on 1/19/17  Next appt scheduled for 3/2/17    Will facilitate refill.

## 2017-02-02 NOTE — TELEPHONE ENCOUNTER
Diana,     I agree with Dr Curtis that we want to avoid anything that could cause the CRPS to flare such as an injection or surgery as long as you can tolerate the discomfort from the ganglion cyst. I am glad that you had it evaluated by Dr Curits. Regarding the blocks, it is up to you. If you think the symptoms are significantly better after the block, I think it would be a good idea to continue them. If you don't feel that the improvement is worth going through the injection, then you can hold off. Since you say that the sensitivity is increasing, I would like to see you try another injection.     SELWYN Romo, NP-C  [facility]

## 2017-02-02 NOTE — TELEPHONE ENCOUNTER
Higinio Tejada, Just wanted to touch base. The sensitivity seems to be increasing again. Also have had more numbness and tingling in my thumb. The muscle spasms are not gone, but better. Went to see Dr. Lawson last Thursday about my left wrist. He said that I have a Ganglion cyst sitting on my nerve and tendon which is causing me the pain. He said I was at higher risk doing any kind of shots or surgery, so I decided at this point to treat the pain and hope that it goes away.   What are your thoughts? Do you think I should have another block or wait a bit longer?    Thanks Diana

## 2017-02-03 ENCOUNTER — OFFICE VISIT (OUTPATIENT)
Dept: PALLIATIVE MEDICINE | Facility: CLINIC | Age: 62
End: 2017-02-03
Payer: OTHER MISCELLANEOUS

## 2017-02-03 ENCOUNTER — HOSPITAL ENCOUNTER (OUTPATIENT)
Dept: OCCUPATIONAL THERAPY | Facility: CLINIC | Age: 62
Setting detail: THERAPIES SERIES
End: 2017-02-03
Attending: NURSE PRACTITIONER
Payer: OTHER MISCELLANEOUS

## 2017-02-03 DIAGNOSIS — G90.511 COMPLEX REGIONAL PAIN SYNDROME TYPE 1 OF RIGHT UPPER EXTREMITY: ICD-10-CM

## 2017-02-03 DIAGNOSIS — G57.71 COMPLEX REGIONAL PAIN SYNDROME TYPE 2 OF RIGHT LOWER EXTREMITY: Primary | ICD-10-CM

## 2017-02-03 PROCEDURE — 96152 HC HEALTH AND BEHAVIOR INTERVENTION, INDIVIDUAL, EACH 15 MINUTES: CPT | Performed by: PSYCHOLOGIST

## 2017-02-03 PROCEDURE — 40000839 ZZH STATISTIC HAND THERAPY VISIT: Performed by: OCCUPATIONAL THERAPIST

## 2017-02-03 PROCEDURE — 97110 THERAPEUTIC EXERCISES: CPT | Mod: GO | Performed by: OCCUPATIONAL THERAPIST

## 2017-02-03 NOTE — PROGRESS NOTES
"                                  [facility]    [facility], [place]  Behavioral Medicine Visit    Patient Name: Diana Buenrostro    YOB: 1955   Medical Record Number: [id number]  Date: 2/3/2017                SUBJECTIVE:  Session objective: Reports she is feeling despondent and she does not know why. Discussed her emotions and she had difficulty identifying a word the captured her emotion. She could describe as this tight, fluttery sensation in her chest, she could identify that she was tearing up. Spent remainder of session focusing exclusively on identifying a sensation, calling it a particular word and just allowing the sensation to remain until it passed on its own. She initially had tremendous difficulty with this idea as \"doing nothing\". We talked about basic truths attached to feeling. 1)they cannot be gotten rid of 2)the always pass 3) the more you let them be the quicker things go    She suggested the idea of preparing her  to be supportive about her pain and about her experience adjusting to pain by letting him know what she was going to try to do and simply ask for acknowledgement, support and a reminder. \"This will pass.\"  We discussed she will likely have trouble with this as it is highly unusual she respond this way         OBJECTIVE:              Length of Visit: 60 minutes                 Mental status: healthy, alert and moderate distress    ASSESSMENT:   Axis I:    Complex Regional Pain Syndrome  Axis II:   Dx deferred    Progress toward goals: fair.                 Pain Status: unchanged                      Emotional Status: unchanged              Medication / chemical use concerns: None    PLAN:    Next Appointment: Diana Buenrostro will schedule a follow-up appointment in 2 weeks.  Assignment: try to do nothing but accept the presence of an emotional and think \"I am going to be okay\"  Objectives / interventions for next " session: expand skill set    Pj Juarez MA, LP, [facility]  Pain Specialist  [facility] [place]

## 2017-02-08 ENCOUNTER — HOSPITAL ENCOUNTER (OUTPATIENT)
Dept: OCCUPATIONAL THERAPY | Facility: CLINIC | Age: 62
Setting detail: THERAPIES SERIES
End: 2017-02-08
Attending: NURSE PRACTITIONER
Payer: OTHER MISCELLANEOUS

## 2017-02-08 PROCEDURE — 40000839 ZZH STATISTIC HAND THERAPY VISIT: Performed by: OCCUPATIONAL THERAPIST

## 2017-02-08 PROCEDURE — 97022 WHIRLPOOL THERAPY: CPT | Mod: GO | Performed by: OCCUPATIONAL THERAPIST

## 2017-02-08 PROCEDURE — 97110 THERAPEUTIC EXERCISES: CPT | Mod: GO | Performed by: OCCUPATIONAL THERAPIST

## 2017-02-08 RX ORDER — GABAPENTIN 300 MG/1
CAPSULE ORAL
Qty: 180 CAPSULE | Refills: 1 | Status: SHIPPED | OUTPATIENT
Start: 2017-02-08 | End: 2017-03-06

## 2017-02-08 NOTE — TELEPHONE ENCOUNTER
Disp Refills Start End CAM      gabapentin (NEURONTIN) 300 MG capsule 180 capsule 1 2017  No     Si mg TID     Class: E-Prescribe     Order: [id number]     E-Prescribing Status: Receipt confirmed by pharmacy (2017  1:09 PM CST)

## 2017-02-15 ENCOUNTER — HOSPITAL ENCOUNTER (OUTPATIENT)
Dept: OCCUPATIONAL THERAPY | Facility: CLINIC | Age: 62
Setting detail: THERAPIES SERIES
End: 2017-02-15
Attending: NURSE PRACTITIONER
Payer: OTHER MISCELLANEOUS

## 2017-02-15 DIAGNOSIS — E03.9 HYPOTHYROIDISM, UNSPECIFIED TYPE: ICD-10-CM

## 2017-02-15 DIAGNOSIS — E78.5 HYPERLIPIDEMIA LDL GOAL <100: ICD-10-CM

## 2017-02-15 PROCEDURE — 97110 THERAPEUTIC EXERCISES: CPT | Mod: GO | Performed by: OCCUPATIONAL THERAPIST

## 2017-02-15 PROCEDURE — 40000839 ZZH STATISTIC HAND THERAPY VISIT: Performed by: OCCUPATIONAL THERAPIST

## 2017-02-15 PROCEDURE — 97022 WHIRLPOOL THERAPY: CPT | Mod: GO | Performed by: OCCUPATIONAL THERAPIST

## 2017-02-15 NOTE — TELEPHONE ENCOUNTER
Levothyroxine     Last Written Prescription Date: 10/11/16  Last Quantity: 30, # refills: 0  Last Office Visit with [facility], P or  Health prescribing provider: 12/21/16   Next 5 appointments (look out 90 days)     Feb 17, 2017 11:00 AM CST   Return Visit with Pj Juarez LP   [facility] [place] ([facility] [place])    [address] [address]   [phone]            Mar 02, 2017 11:00 AM CST   Return Visit with SELWYN Gaona CNP   [facility] ([facility])    [address]  [facility] [address] [phone]                   TSH   Date Value Ref Range Status   11/17/2016 1.38 0.40 - 4.00 mU/L Final     Simvastatin       Last Written Prescription Date: 01/03/17  Last Fill Quantity: 30, # refills: 0    Last Office Visit with [facility], [facility] or  Health prescribing provider:  12/21/16   Future Office Visit:    Next 5 appointments (look out 90 days)     Feb 17, 2017 11:00 AM CST   Return Visit with Pj Juarez LP   [facility] ([facility] [place])    [address]  [place] [address]   [phone]            Mar 02, 2017 11:00 AM CST   Return Visit with SELWYN Gaona CNP   [facility] ([facility])    [address]  [address]  [facility] [address] [phone]                  Cholesterol   Date Value Ref Range Status   10/20/2015 155 <200 mg/dL Final     Comment:     LDL Cholesterol is the primary guide to therapy.   The NCEP recommends further evaluation of: patients with cholesterol greater   than 200 mg/dL if additional risk factors are present, cholesterol greater   than   240 mg/dL, triglycerides greater than 150 mg/dL, or HDL less than 40 mg/dL.       HDL Cholesterol   Date Value Ref Range Status   10/20/2015 58 >50 mg/dL Final     LDL Cholesterol Calculated   Date Value Ref Range Status   10/20/2015 76 0 - 129  mg/dL Final     Comment:     LDL Cholesterol is the primary guide to therapy: LDL-cholesterol goal in high   risk patients is <100 mg/dL and in very high risk patients is <70 mg/dL.       LDL Cholesterol Direct   Date Value Ref Range Status   10/29/2013 149 (H) 0 - 129 mg/dL Final     Comment:     Optimal:         <100 mg/dL   Near Optimal:     100-129 mg/dL   Borderline High:  130-159 mg/dL   High:             160-189 mg/dL   Very high:  greater than or equal to 190 mg/dL   Cannot estimate LDL when triglyceride exceeds 400 mg/dL     Triglycerides   Date Value Ref Range Status   10/20/2015 107 0 - 150 mg/dL Final     Cholesterol/HDL Ratio   Date Value Ref Range Status   10/20/2015 2.7 0.0 - 5.0 Final     ALT   Date Value Ref Range Status   11/17/2016 57 (H) 0 - 50 U/L Final

## 2017-02-17 ENCOUNTER — OFFICE VISIT (OUTPATIENT)
Dept: PALLIATIVE MEDICINE | Facility: CLINIC | Age: 62
End: 2017-02-17
Payer: OTHER MISCELLANEOUS

## 2017-02-17 ENCOUNTER — HOSPITAL ENCOUNTER (OUTPATIENT)
Dept: OCCUPATIONAL THERAPY | Facility: CLINIC | Age: 62
Setting detail: THERAPIES SERIES
End: 2017-02-17
Attending: NURSE PRACTITIONER
Payer: OTHER MISCELLANEOUS

## 2017-02-17 DIAGNOSIS — G57.71 COMPLEX REGIONAL PAIN SYNDROME TYPE 2 OF RIGHT LOWER EXTREMITY: Primary | ICD-10-CM

## 2017-02-17 DIAGNOSIS — G56.41 COMPLEX REGIONAL PAIN SYNDROME TYPE 2 OF RIGHT UPPER EXTREMITY: ICD-10-CM

## 2017-02-17 PROCEDURE — 96152 HC HEALTH AND BEHAVIOR INTERVENTION, INDIVIDUAL, EACH 15 MINUTES: CPT | Performed by: PSYCHOLOGIST

## 2017-02-17 PROCEDURE — 40000839 ZZH STATISTIC HAND THERAPY VISIT: Performed by: OCCUPATIONAL THERAPIST

## 2017-02-17 PROCEDURE — 97022 WHIRLPOOL THERAPY: CPT | Mod: GO | Performed by: OCCUPATIONAL THERAPIST

## 2017-02-17 PROCEDURE — 97110 THERAPEUTIC EXERCISES: CPT | Mod: GO | Performed by: OCCUPATIONAL THERAPIST

## 2017-02-17 NOTE — PROGRESS NOTES
"                                  [facility]   [facility] [place]  Behavioral Medicine Visit    Patient Name: Diana Buenrostro    YOB: 1955   Medical Record Number: [id number]  Date: 2/17/2017                SUBJECTIVE:  Session objective: In for visit; Crying throughout the visit; Describes emotions as intense. Has a history of being disconnected from affect, interpreting as weakness and frequently speaks of herself, her coping in the 3rd person. However, she additionally is having unexpected and incongruent crying spells, Her sleep is disrupted even more in relationship to her pain/work circumstances. She feels badly about herself, she is regularly fatigued, she regularly has sad feelings about her situation that overwhelm fairly quickly. Yet her view is she is not depressed and this feedback is just \"another problem I have to deal\" She continues to hang on to denial in its most basic form, \"I just want it to go back to the way it was    I have recommended she consider standard depression protocol including therapy and consultation for medication. She has no current suicidal ideation, intent or plan. She has never made an attempt. She does not have a complex psychiatric history. I have discussed at length what our talk therapy focus will be; pain coping continued but also discussion of active steps she can take to get more parameters around her situation.    Diana has agreed with plan and will make a contact to her PCP. We will hold off on a mental health referral for a month or two         OBJECTIVE:   Length of Visit: 45 minutes      Mental status: healthy, alert, moderate -severe distress, cooperative and crying    ASSESSMENT:   [place] I: Complex Regional Pain Type 2 lower right extremity and upper right extremity.   Axis II: Dx deferred    Progress toward goals: fair.      Pain Status: unchanged    Emotional Status: worsened   Medication / " chemical use concerns: None    PLAN:   Next Appointment: Diana DIANNE Buenrostro will schedule a follow-up appointment in 2-3 weeks.  Assignment: Make decision if she intends to pursue medication consult. If so schedule appointment  Objectives / interventions for next session: continue with treatment plan    Pj Juarez MA, LP, [facility]  Pain Specialist  [facility]

## 2017-02-17 NOTE — MR AVS SNAPSHOT
After Visit Summary   2/17/2017    Diana Buenrostro    MRN: [id number]           Patient Information     Date Of Birth          1955        Visit Information        Provider Department      2/17/2017 11:00 AM Pj Juarez LP [place] [facility]        Today's Diagnoses     Complex regional pain syndrome type 2 of right lower extremity    -  1    Complex regional pain syndrome type 2 of right upper extremity           Follow-ups after your visit        Your next 10 appointments already scheduled     Feb 22, 2017  9:00 AM CST   Treatment with Keri Ramsey, OT   [facility] ([facility])    [address]  [address]  [facility] [address]   [phone]            Feb 24, 2017 10:45 AM CST   Radiology Injections with Gaurang Pena MD   [facility] [place] ([facility] [place])    [address]  [place] [address] [phone]            Mar 01, 2017  9:00 AM CST   Treatment with Yaritza Ugalde OT   [facility] ([facility])    [address]  [address]  [facility] [address]   [phone]            Mar 02, 2017 11:00 AM CST   Return Visit with SELWYN Gaona CNP   [facility] ([facility])    [address]  [facility] [address] [phone]            Mar 07, 2017  4:00 PM CST   Return Visit with Pj Juarez LP   [facility] [place] ([facility] [place])    [address]  [place] [address] [phone]              Who to contact     If you have questions or need follow up information about today's clinic visit or your schedule please contact [facility] [place] directly at [phone].  Normal or non-critical lab and imaging results will be communicated to you by MyChart, letter or phone within 4 business days after the clinic has received the results. If  you do not hear from us within 7 days, please contact the clinic through [employer] or phone. If you have a critical or abnormal lab result, we will notify you by phone as soon as possible.  Submit refill requests through [employer] or call your pharmacy and they will forward the refill request to us. Please allow 3 business days for your refill to be completed.          Additional Information About Your Visit        [employer] Information     [employer] gives you secure access to your electronic health record. If you see a primary care provider, you can also send messages to your care team and make appointments. If you have questions, please call your primary care clinic.  If you do not have a primary care provider, please call [phone] and they will assist you.        Care EveryWhere ID     This is your Care EveryWhere ID. This could be used by other organizations to access your [place] medical records  [id number]         Blood Pressure from Last 3 Encounters:   01/19/17 146/87   01/12/17 135/76   12/21/16 121/73    Weight from Last 3 Encounters:   01/26/17 76.4 kg (168 lb 6.4 oz)   01/19/17 77.1 kg (170 lb)   12/21/16 75.8 kg (167 lb)              We Performed the Following     HEALTH & BEHAVIOR ASSESS, INITIAL, EA 15MIN PHD        Primary Care Provider Office Phone # Fax #    Samantha SELWYN Roldan [facility] [phone]       [facility] [address] [place] [address]        Thank you!     Thank you for choosing [facility]  for your care. Our goal is always to provide you with excellent care. Hearing back from our patients is one way we can continue to improve our services. Please take a few minutes to complete the written survey that you may receive in the mail after your visit with us. Thank you!             Your Updated Medication List - Protect others around you: Learn how to safely use, store and throw away your medicines at www.disposemymeds.org.          This list is accurate  as of: 2/17/17 12:31 PM.  Always use your most recent med list.                   Brand Name Dispense Instructions for use    diclofenac 1 % Gel topical gel    VOLTAREN    100 g    Apply 2-4 grams to affected area four times daily       gabapentin 300 MG capsule    NEURONTIN    180 capsule    600 mg TID       HYDROcodone-acetaminophen 5-325 MG per tablet    NORCO    20 tablet    1/2 to 1 tablet daily as needed for moderate to severe pain. #20 tabs to last 30 days.       levothyroxine 50 MCG tablet    SYNTHROID/LEVOTHROID    30 tablet    Take 50 mcg on even days, and 25 mcg (1/2 tab) on odd days. (Needs follow-up appointment for this medication)       lidocaine 5 % ointment    XYLOCAINE    100 g    Apply topically 4 times daily as needed for moderate pain       methocarbamol 500 MG tablet    ROBAXIN    120 tablet    Take 1-2 tablets (500-1,000 mg) by mouth 4 times daily as needed for muscle spasms       simvastatin 20 MG tablet    ZOCOR    30 tablet    Take 1 tablet (20 mg) by mouth At Bedtime (Needs fasting lab work)       VITAMIN C PO      Take 500 mg by mouth daily

## 2017-02-21 ENCOUNTER — MYC MEDICAL ADVICE (OUTPATIENT)
Dept: PALLIATIVE MEDICINE | Facility: CLINIC | Age: 62
End: 2017-02-21

## 2017-02-21 NOTE — TELEPHONE ENCOUNTER
Higinio Tejada, Just wanted to check in with you about a few things. I have been more down the last few weeks,  Dr. García thinks maybe a medication for depression may help. I did make a appt. with my primary  for next Tuesday but then I thought maybe this was something I could do with you. One medication that he mentioned was Cymbalta. What do you think of that? Both my wrists have been painful and Sunday I reached back to push something down with my right hand/wrist and it has been more painful ever since. At PT last week she measured them both and they were both more swollen and very weak.  I have a block scheduled for Friday. I started having some flu bug this morning so hopefully I will be well enough for that.  Thanks Diana.

## 2017-02-22 ENCOUNTER — HOSPITAL ENCOUNTER (OUTPATIENT)
Dept: OCCUPATIONAL THERAPY | Facility: CLINIC | Age: 62
Setting detail: THERAPIES SERIES
End: 2017-02-22
Attending: NURSE PRACTITIONER
Payer: OTHER MISCELLANEOUS

## 2017-02-22 PROCEDURE — 40000839 ZZH STATISTIC HAND THERAPY VISIT: Performed by: OCCUPATIONAL THERAPIST

## 2017-02-22 PROCEDURE — 97022 WHIRLPOOL THERAPY: CPT | Mod: GO | Performed by: OCCUPATIONAL THERAPIST

## 2017-02-22 RX ORDER — LEVOTHYROXINE SODIUM 50 UG/1
TABLET ORAL
Qty: 30 TABLET | Refills: 0 | Status: SHIPPED | OUTPATIENT
Start: 2017-02-22 | End: 2017-02-28

## 2017-02-22 RX ORDER — SIMVASTATIN 20 MG
20 TABLET ORAL AT BEDTIME
Qty: 30 TABLET | Refills: 0 | Status: SHIPPED | OUTPATIENT
Start: 2017-02-22 | End: 2017-02-28

## 2017-02-22 NOTE — TELEPHONE ENCOUNTER
Lab Results   Component Value Date    CHOL 155 10/20/2015     Lab Results   Component Value Date    HDL 58 10/20/2015     Lab Results   Component Value Date    LDL 76 10/20/2015     10/29/2013     Lab Results   Component Value Date    TRIG 107 10/20/2015     Lab Results   Component Value Date    CHOLHDLRATIO 2.7 10/20/2015     TSH   Date Value Ref Range Status   11/17/2016 1.38 0.40 - 4.00 mU/L Final   ]   has appointment on 2/28 will send 30 day supply

## 2017-02-22 NOTE — TELEPHONE ENCOUNTER
Parviz Ortizmbalta is a medication that treats both depression and pain although it is not known for managing the type of pain from CRPS. Sine we would be addressing mood with this medication rather than pain, it should be managed by your primary care provider. I hope you are able to have the injection.     Take care,   SELWYN Romo, NP-C  [facility]

## 2017-02-24 ENCOUNTER — RADIOLOGY INJECTION OFFICE VISIT (OUTPATIENT)
Dept: PALLIATIVE MEDICINE | Facility: CLINIC | Age: 62
End: 2017-02-24
Payer: OTHER MISCELLANEOUS

## 2017-02-24 ENCOUNTER — RADIANT APPOINTMENT (OUTPATIENT)
Dept: RADIOLOGY | Facility: CLINIC | Age: 62
End: 2017-02-24
Attending: ANESTHESIOLOGY
Payer: OTHER MISCELLANEOUS

## 2017-02-24 VITALS — HEART RATE: 76 BPM | SYSTOLIC BLOOD PRESSURE: 149 MMHG | DIASTOLIC BLOOD PRESSURE: 87 MMHG | OXYGEN SATURATION: 100 %

## 2017-02-24 DIAGNOSIS — G90.511 COMPLEX REGIONAL PAIN SYNDROME TYPE 1 OF RIGHT UPPER EXTREMITY: ICD-10-CM

## 2017-02-24 DIAGNOSIS — G56.41 COMPLEX REGIONAL PAIN SYNDROME TYPE 2 OF RIGHT UPPER EXTREMITY: Primary | ICD-10-CM

## 2017-02-24 PROCEDURE — 64510 N BLOCK STELLATE GANGLION: CPT | Mod: RT | Performed by: ANESTHESIOLOGY

## 2017-02-24 ASSESSMENT — PAIN SCALES - GENERAL
PAINLEVEL: MODERATE PAIN (5)
PAINLEVEL: MODERATE PAIN (5)

## 2017-02-24 NOTE — NURSING NOTE
Discharge Information    IV Discontiued Time: 1140 catheter intact    Amount of Fluid Infused:  NA    Discharge Criteria = When patient returns to baseline or as per MD order    Consciousness:  Pt is fully awake    Circulation:  BP +/- 20% of pre-procedure level    Respiration:  Patient is able to breathe deeply    O2 Sat:  Patient is able to maintain O2 Sat >92% on room air    Activity:  Moves 4 extremities on command    Ambulation:  Patient is able to stand and walk or stand and pivot into wheelchair    Dressing:  Clean/dry or No Dressing    Notes:   Discharge instructions and AVS given to patient    Patient meets criteria for discharge?  YES    Admitted to PCU?  No    Responsible adult present to accompany patient home?  Yes    Signature/Title:    Tiana Etienne RN Care Coordinator  [facility]

## 2017-02-24 NOTE — NURSING NOTE
"Chief Complaint   Patient presents with     Pain       Initial /83  Pulse 83 Estimated body mass index is 24.87 kg/(m^2) as calculated from the following:    Height as of 1/26/17: 1.753 m (5' 9\").    Weight as of 1/26/17: 76.4 kg (168 lb 6.4 oz).  Medication Reconciliation: complete     Injection intake:    If this procedure is requiring IV sedation has patient been NPO for 6  Hours? NA    Is patient on coumadin, plavix or other prescribed blood thinner?   No    If patient is on coumadin was it held for 5 days?   NA    If patient is on plavix was it held for 7 days?    NA     Does patient take aspirin?  No    If this is for a cervical procedure and patient is on aspirin has it been held for 6 days?   NA    Any allergies to contrast dye, iodine, steroid and/or numbing medications?  NO    Is patient currently taking antibiotics or have an active infection?  NO    Does patient have a ? Yes       Is patient pregnant or breastfeeding?  NO    Are the vital signs normal?  Yes    Kellie Khoury CMA (AAMA)          "

## 2017-02-24 NOTE — PATIENT INSTRUCTIONS
[facility]   Procedure Discharge Instructions    Today you saw:  Dr. Gaurang Pena      You had an:  Stellate Ganglion Block:  You may have one or more of the following: difficulty swallowing, hoarseness, sore throat, mild swelling or bruising or the neck, drooping or redness of the eyelid on the side injected, a very small pupil on the side injected, weakness of the arm on the side injected.  These symptons should go away within 24 hours  If you experience chest pain, shortness of breath or problems breathing go to your local Emergency Room    Do not eat until the lump in your throat goes down (usually about 4 hours)  Do not drink hot drinks for the next 12 hours  You may take small sips of cool drinks or ice chips  Use a sling to protect your arm if needed       Medications used:  Lidocaine  Omnipaque  Ropivicaine           Be cautious when walking. Numbness and/or weakness in the lower extremities may occur up to 6-8 hours after the procedure due to effect of the local anesthetic    Do not drive for 6 hours. The effect of the local anesthetic could slow your reflexes.     You may resume your regular activities after 24 hours    Avoid strenuous activity for the first 24 hours    You may shower, however avoid swimming, tub baths or hot tubs for 24 hours following your procedure    You may have a mild to moderate increase in pain for several days following the injection.     You may use ice packs for 10-15 minutes, 3 to 4 times a day at the injection site for comfort    Do not use heat to painful areas for 6 to 8 hours. This will give the local anesthetic time to wear off and prevent you from accidentally burning your skin.     You may use anti-inflammatory medications (such as Ibuprofen or Aleve or Advil) or Tylenol for pain control if necessary    If you were fasting, you may resume your normal diet and medications after the procedure    If you experience any of the following, call  the pain center nursing line during work hours at [phone] or the after hours provider line at [phone]:  -Fever over 100 degree F  -Swelling, bleeding, redness, drainage, warmth at the injection site  -Progressive weakness or numbness in your legs or arms  -Loss of bowel or bladder function  -Unusual headache that is not relieved by Tylenol  -Unusual new onset of pain that is not improving      Phone #s:  Appointment line: [phone];  Nurse line: [phone]

## 2017-02-24 NOTE — MR AVS SNAPSHOT
After Visit Summary   2/24/2017    Diana Buenrostro    MRN: [id number]           Patient Information     Date Of Birth          1955        Visit Information        Provider Department      2/24/2017 10:45 AM Gaurang Tolentino MD [facility]        Care Instructions        [facility]   Procedure Discharge Instructions    Today you saw:  Dr. Gaurang Pena      You had an:  Stellate Ganglion Block:  You may have one or more of the following: difficulty swallowing, hoarseness, sore throat, mild swelling or bruising or the neck, drooping or redness of the eyelid on the side injected, a very small pupil on the side injected, weakness of the arm on the side injected.  These symptons should go away within 24 hours  If you experience chest pain, shortness of breath or problems breathing go to your local Emergency Room    Do not eat until the lump in your throat goes down (usually about 4 hours)  Do not drink hot drinks for the next 12 hours  You may take small sips of cool drinks or ice chips  Use a sling to protect your arm if needed       Medications used:  Lidocaine  Omnipaque  Ropivicaine           Be cautious when walking. Numbness and/or weakness in the lower extremities may occur up to 6-8 hours after the procedure due to effect of the local anesthetic    Do not drive for 6 hours. The effect of the local anesthetic could slow your reflexes.     You may resume your regular activities after 24 hours    Avoid strenuous activity for the first 24 hours    You may shower, however avoid swimming, tub baths or hot tubs for 24 hours following your procedure    You may have a mild to moderate increase in pain for several days following the injection.     You may use ice packs for 10-15 minutes, 3 to 4 times a day at the injection site for comfort    Do not use heat to painful areas for 6 to 8 hours. This will give the local anesthetic time to wear off and prevent  you from accidentally burning your skin.     You may use anti-inflammatory medications (such as Ibuprofen or Aleve or Advil) or Tylenol for pain control if necessary    If you were fasting, you may resume your normal diet and medications after the procedure    If you experience any of the following, call the pain center nursing line during work hours at [phone] or the after hours provider line at [phone]:  -Fever over 100 degree F  -Swelling, bleeding, redness, drainage, warmth at the injection site  -Progressive weakness or numbness in your legs or arms  -Loss of bowel or bladder function  -Unusual headache that is not relieved by Tylenol  -Unusual new onset of pain that is not improving      Phone #s:  Appointment line: [phone];  Nurse line: [phone]            Follow-ups after your visit        Your next 10 appointments already scheduled     Feb 28, 2017 10:00 AM CST   SHORT with SELWYN Cifuentes CNP   [facility]    [address] [address]   [phone]            Mar 01, 2017  9:00 AM CST   Treatment with Yaritza Ugalde OT   [facility] ([facility])    [address]  [address]  [facility] [address]   [phone]            Mar 02, 2017 11:00 AM CST   Return Visit with SELWYN Gaona CNP   [facility] ([facility])    [address]  [address]  [facility] [address] [phone]            Mar 07, 2017  4:00 PM CST   Return Visit with Pj Juarez LP   [facility] [place] ([facility] [place])    [address]  [place] [address] [phone]              Who to contact     If you have questions or need follow up information about today's clinic visit or your schedule please contact [facility] [place] directly at [phone].  Normal or non-critical lab and imaging results will be  communicated to you by [employer], letter or phone within 4 business days after the clinic has received the results. If you do not hear from us within 7 days, please contact the clinic through My [employer] or phone. If you have a critical or abnormal lab result, we will notify you by phone as soon as possible.  Submit refill requests through [employer] or call your pharmacy and they will forward the refill request to us. Please allow 3 business days for your refill to be completed.          Additional Information About Your Visit        [employer] Information     [employer] gives you secure access to your electronic health record. If you see a primary care provider, you can also send messages to your care team and make appointments. If you have questions, please call your primary care clinic.  If you do not have a primary care provider, please call [phone] and they will assist you.        Care EveryWhere ID     This is your Care EveryWhere ID. This could be used by other organizations to access your [place] medical records  [id number]        Your Vitals Were     Pulse                   83            Blood Pressure from Last 3 Encounters:   02/24/17 135/83   01/19/17 146/87   01/12/17 135/76    Weight from Last 3 Encounters:   01/26/17 76.4 kg (168 lb 6.4 oz)   01/19/17 77.1 kg (170 lb)   12/21/16 75.8 kg (167 lb)              Today, you had the following     No orders found for display       Primary Care Provider Office Phone # Fax #    Samantha Olsen SELWYN Dhaliwal [facility] [phone]       [address] [address]        Thank you!     Thank you for choosing [facility]  for your care. Our goal is always to provide you with excellent care. Hearing back from our patients is one way we can continue to improve our services. Please take a few minutes to complete the written survey that you may receive in the mail after your visit with us. Thank you!             Your Updated  Medication List - Protect others around you: Learn how to safely use, store and throw away your medicines at www.disposemymeds.org.          This list is accurate as of: 2/24/17 11:29 AM.  Always use your most recent med list.                   Brand Name Dispense Instructions for use    diclofenac 1 % Gel topical gel    VOLTAREN    100 g    Apply 2-4 grams to affected area four times daily       gabapentin 300 MG capsule    NEURONTIN    180 capsule    600 mg TID       HYDROcodone-acetaminophen 5-325 MG per tablet    NORCO    20 tablet    1/2 to 1 tablet daily as needed for moderate to severe pain. #20 tabs to last 30 days.       levothyroxine 50 MCG tablet    SYNTHROID/LEVOTHROID    30 tablet    Take 50 mcg on even days, and 25 mcg (1/2 tab) on odd days. (Needs follow-up appointment for this medication)       lidocaine 5 % ointment    XYLOCAINE    100 g    Apply topically 4 times daily as needed for moderate pain       methocarbamol 500 MG tablet    ROBAXIN    120 tablet    Take 1-2 tablets (500-1,000 mg) by mouth 4 times daily as needed for muscle spasms       simvastatin 20 MG tablet    ZOCOR    30 tablet    Take 1 tablet (20 mg) by mouth At Bedtime (Needs fasting lab work)       VITAMIN C PO      Take 500 mg by mouth daily

## 2017-02-24 NOTE — PROGRESS NOTES
Pre procedure Diagnosis: CRPS of the right upper extremity, chronic pain syndrome  Post proceudure Diagnosis:Same  Procedure performed: Stellate ganglion block on the right at C7 under fluorsoscopic guidance, contrast controlled  Indication:  Therapeutic for pain  Anesthesia: local  Complications: none  Operators: Gaurang Pena MD     INDICATIONS:    Diana Buenrostro is a 61 year old female with a h/o chronic right upper extremity pain with CRPS after a distal radius fracture.  Her last Stellate Ganglion Block was performed on 1/12/17 with significant improvement in the hypersensitivity, color, and temperature of the right wrist and hand for over 3 weeks. She presents today for repeat Stellate Ganglion block.      Options, benefits and risks were discussed with the patient including bleeding, infection, hoarseness, droopy face/eye, swallowing problems, no pain relief, seizure, stroke, paralysis, nerve injury, spinal cord injury, spinal headache, spinal fluid leak, coma, death. Questions were answered to her satisfaction and she agrees to proceed. Voluntary informed consent was obtained and signed.  The patient's medical history, medications, and allergies were reviewed and reconciled.     Vitals: /87  Pulse 76  SpO2 100%  Medications reviewed? Yes  Allergies reviewed? yes  Pre-procedure pain: 5/10     PROCEDURE IN DETAIL:  After obtaining signed informed consent the patient was brought into the procedure suit and placed in a supine position on the procedure table. Patient's neck area was prepped and draped in the usual sterile fashion. The transverse process of C7 on the right side was identified under AP fluoroscopic guidance. Soft tissue of her neck were retracted using my left hand until palpation of transverse process was obtained. 1 ml of 1% lidocaine was injected at the needle entry point and needle tract using a 30 gauge 1 inch needle. Then a 27 gauge 3.5 inch spinal needle was inserted and slowly  advanced under fluoroscopic guidance until bony contact was made at the site where the transverse process meets the body of C7. Lateral fluoroscopic guidance was also obtained to confirm the needle depth and position. After negative aspiration for heme and CSF, 1 cc of Omnipaque 300 contrast dye was injected utilizing real time fluoroscopy that was negative for vascular uptake and showed excellent spread of  contrast along the anterior aspect of the C7 and T1 vertebral bodies on the right.  Omnipaque wasted:  9 ml.       Then, after negative aspiration, a test dose was performed by injecting 2 ml of Lidocaine 1%  which was negative for adverse effects. Then, after repeated negative aspiration, 8 ml of a combination of 0.2% Ropivicaine was injected slowly and incrementally with frequent intermittent aspirations. The needle was then flushed with Lidocaine 1% as it was withdrawn, the patient's neck was cleansed and a sterile dressing was applied. The patient was brought to the recovery area.      In the recovery area patient reported no significant pain relief but she had significant hyperemia and increased temperature of the right upper extremity and a positive Lili's syndrome which is an indication of a successful stellate ganglion block.      Patient's pre procedure  pain intensity score was 5/10 and post procedure pain intensity score was 5/10. Patient will continue monitoring progress and will be followed in the pain clinic by SELWYN Ferguson as scheduled. The patient was then discharged in good condition with discharge instructions.     Follow up includes:  - post procedure nurse call at one week  - follow up in the pain clinic as scheduled     Gaurang Pena MD  [facility]

## 2017-02-24 NOTE — NURSING NOTE
20 gauge Peripheral IV inserted into left anticubital - attempts: 1    Jesusita Etienne RN, [facility]  Pain Clinic Care Coordinator

## 2017-02-28 ENCOUNTER — OFFICE VISIT (OUTPATIENT)
Dept: FAMILY MEDICINE | Facility: CLINIC | Age: 62
End: 2017-02-28
Payer: COMMERCIAL

## 2017-02-28 VITALS
TEMPERATURE: 96.5 F | SYSTOLIC BLOOD PRESSURE: 123 MMHG | HEART RATE: 72 BPM | HEIGHT: 69 IN | BODY MASS INDEX: 25.39 KG/M2 | DIASTOLIC BLOOD PRESSURE: 73 MMHG | WEIGHT: 171.4 LBS

## 2017-02-28 DIAGNOSIS — N18.30 CHRONIC KIDNEY DISEASE, STAGE 3 (MODERATE): Primary | Chronic | ICD-10-CM

## 2017-02-28 DIAGNOSIS — F32.0 MAJOR DEPRESSIVE DISORDER, SINGLE EPISODE, MILD (H): ICD-10-CM

## 2017-02-28 DIAGNOSIS — E03.9 HYPOTHYROIDISM, UNSPECIFIED TYPE: ICD-10-CM

## 2017-02-28 DIAGNOSIS — G56.41 COMPLEX REGIONAL PAIN SYNDROME TYPE 2 OF RIGHT UPPER EXTREMITY: ICD-10-CM

## 2017-02-28 DIAGNOSIS — E78.5 HYPERLIPIDEMIA LDL GOAL <100: ICD-10-CM

## 2017-02-28 DIAGNOSIS — F41.1 GAD (GENERALIZED ANXIETY DISORDER): ICD-10-CM

## 2017-02-28 LAB
ANION GAP SERPL CALCULATED.3IONS-SCNC: 6 MMOL/L (ref 3–14)
BUN SERPL-MCNC: 16 MG/DL (ref 7–30)
CALCIUM SERPL-MCNC: 9.2 MG/DL (ref 8.5–10.1)
CHLORIDE SERPL-SCNC: 104 MMOL/L (ref 94–109)
CHOLEST SERPL-MCNC: 193 MG/DL
CO2 SERPL-SCNC: 30 MMOL/L (ref 20–32)
CREAT SERPL-MCNC: 1.09 MG/DL (ref 0.52–1.04)
GFR SERPL CREATININE-BSD FRML MDRD: 51 ML/MIN/1.7M2
GLUCOSE SERPL-MCNC: 90 MG/DL (ref 70–99)
HDLC SERPL-MCNC: 65 MG/DL
LDLC SERPL CALC-MCNC: 110 MG/DL
NONHDLC SERPL-MCNC: 128 MG/DL
POTASSIUM SERPL-SCNC: 4.4 MMOL/L (ref 3.4–5.3)
SODIUM SERPL-SCNC: 140 MMOL/L (ref 133–144)
TRIGL SERPL-MCNC: 90 MG/DL

## 2017-02-28 PROCEDURE — 99214 OFFICE O/P EST MOD 30 MIN: CPT | Performed by: NURSE PRACTITIONER

## 2017-02-28 PROCEDURE — 80061 LIPID PANEL: CPT | Performed by: NURSE PRACTITIONER

## 2017-02-28 PROCEDURE — 36415 COLL VENOUS BLD VENIPUNCTURE: CPT | Performed by: NURSE PRACTITIONER

## 2017-02-28 PROCEDURE — 80048 BASIC METABOLIC PNL TOTAL CA: CPT | Performed by: NURSE PRACTITIONER

## 2017-02-28 RX ORDER — DULOXETIN HYDROCHLORIDE 20 MG/1
20 CAPSULE, DELAYED RELEASE ORAL 2 TIMES DAILY
Qty: 60 CAPSULE | Refills: 1 | Status: SHIPPED | OUTPATIENT
Start: 2017-02-28 | End: 2017-05-03

## 2017-02-28 RX ORDER — LEVOTHYROXINE SODIUM 50 UG/1
TABLET ORAL
Qty: 30 TABLET | Refills: 11 | Status: SHIPPED | OUTPATIENT
Start: 2017-02-28 | End: 2018-03-05

## 2017-02-28 RX ORDER — SIMVASTATIN 20 MG
20 TABLET ORAL AT BEDTIME
Qty: 30 TABLET | Refills: 11 | Status: SHIPPED | OUTPATIENT
Start: 2017-02-28 | End: 2018-08-21

## 2017-02-28 ASSESSMENT — ANXIETY QUESTIONNAIRES
3. WORRYING TOO MUCH ABOUT DIFFERENT THINGS: MORE THAN HALF THE DAYS
5. BEING SO RESTLESS THAT IT IS HARD TO SIT STILL: NOT AT ALL
2. NOT BEING ABLE TO STOP OR CONTROL WORRYING: MORE THAN HALF THE DAYS
6. BECOMING EASILY ANNOYED OR IRRITABLE: NOT AT ALL
1. FEELING NERVOUS, ANXIOUS, OR ON EDGE: SEVERAL DAYS
7. FEELING AFRAID AS IF SOMETHING AWFUL MIGHT HAPPEN: NOT AT ALL
GAD7 TOTAL SCORE: 5

## 2017-02-28 ASSESSMENT — PATIENT HEALTH QUESTIONNAIRE - PHQ9: 5. POOR APPETITE OR OVEREATING: NOT AT ALL

## 2017-02-28 NOTE — NURSING NOTE
"Chief Complaint   Patient presents with     Depression     Pt here to discuss some depression symptoms, was recomended by one of her pain doctors.     Refill Request     thyroid and cholesterol med, due for fasting lipids, had 1 cup of coffee with a little creamer today, no food       Initial /73  Pulse 72  Temp 96.5  F (35.8  C) (Tympanic)  Ht 5' 9\" (1.753 m)  Wt 171 lb 6.4 oz (77.7 kg)  BMI 25.31 kg/m2 Estimated body mass index is 25.31 kg/(m^2) as calculated from the following:    Height as of this encounter: 5' 9\" (1.753 m).    Weight as of this encounter: 171 lb 6.4 oz (77.7 kg).  Medication Reconciliation: complete  Manuela Saldana,YARY    "

## 2017-02-28 NOTE — PROGRESS NOTES
SUBJECTIVE:                                                    Diana Buenrostro is a 61 year old female who presents to clinic today for the following health issues:  Chief Complaint   Patient presents with     Depression     Pt here to discuss some depression symptoms, was recomended by one of her pain doctors.     Refill Request     thyroid and cholesterol med, due for fasting lipids, had 1 cup of coffee with a little creamer today, no food       Medication Followup of synthroid and zocor    Taking Medication as prescribed: yes    Side Effects:  None    Medication Helping Symptoms:  Yes    Hyperlipidemia:  LDL Cholesterol Calculated   Date Value Ref Range Status   10/20/2015 76 0 - 129 mg/dL Final     Comment:     LDL Cholesterol is the primary guide to therapy: LDL-cholesterol goal in high   risk patients is <100 mg/dL and in very high risk patients is <70 mg/dL.       LDL Cholesterol Direct   Date Value Ref Range Status   10/29/2013 149 (H) 0 - 129 mg/dL Final     Comment:     Optimal:         <100 mg/dL   Near Optimal:     100-129 mg/dL   Borderline High:  130-159 mg/dL   High:             160-189 mg/dL   Very high:  greater than or equal to 190 mg/dL   Cannot estimate LDL when triglyceride exceeds 400 mg/dL   ]  HDL Cholesterol   Date Value Ref Range Status   10/20/2015 58 >50 mg/dL Final   ]  Cholesterol   Date Value Ref Range Status   10/20/2015 155 <200 mg/dL Final     Comment:     LDL Cholesterol is the primary guide to therapy.   The NCEP recommends further evaluation of: patients with cholesterol greater   than 200 mg/dL if additional risk factors are present, cholesterol greater   than   240 mg/dL, triglycerides greater than 150 mg/dL, or HDL less than 40 mg/dL.     ]     Hypothyroidism:   TSH   Date Value Ref Range Status   11/17/2016 1.38 0.40 - 4.00 mU/L Final   no new symptoms.     Abnormal Mood Symptoms     Onset: couple months ago    Description:   Depression: YES  Anxiety: YES-  sometimes    Accompanying Signs & Symptoms:  Still participating in activities that you used to enjoy: YES  Fatigue: YES  Irritability: no  Difficulty concentrating: no  Changes in appetite: no  Problems with sleep: YES- sometimes, trouble falling or staying asleep due to pain  Heart racing/beating fast : No  Thoughts of hurting yourself or others: none  Patient going to psychologist through pain clinic      History:   Recent stress: YES- related to arm, crps  Prior depression hospitalization: None  Family history of depression: no  Family history of anxiety: no      Precipitating factors:   Alcohol/drug use: no    Alleviating factors:  Breathing exercises, also seeing a pschyiatrist     Therapies Tried and outcome: Paxil (Paroxetine)-20 yrs ago - unsure if it helped, to long ago     Chronic kidney disease: stable      -------------------------------------    Problem list and histories reviewed & adjusted, as indicated.  Additional history: as documented    Patient Active Problem List   Diagnosis     Kidney donor     Chronic kidney disease (CKD)     Pulmonary nodule     HYPERLIPIDEMIA LDL GOAL <100     Sarcoidosis (H)     Hypothyroidism     Advanced directives, counseling/discussion     Calculus of gallbladder with other cholecystitis, without mention of obstruction     Transient visual loss     Closed fracture of distal end of right radius     Complex regional pain syndrome type 2 of right upper extremity     Past Surgical History   Procedure Laterality Date     Surgical history of -   6/14/2006     Laparoscopic and hand-assisted left donor ureteronephrectomy     Hysterectomy, vaginal  2000     Partial Hysterectomy for menorrhagia     Laparoscopic wedge resection of right lung  2010     Laparoscopic cholecystectomy  4/9/2014     Procedure: LAPAROSCOPIC CHOLECYSTECTOMY;  Laparoscopic vs Open Cholecystectomy;  Surgeon: Kamille Hilliard MD;  Location: [facility] OR     Arthroscopy knee with medial meniscectomy  "Right 1/22/2015     Procedure: ARTHROSCOPY KNEE WITH MEDIAL MENISCECTOMY;  Surgeon: Alberto Mason MD;  Location: [facility] OR       Social History   Substance Use Topics     Smoking status: Former Smoker     Years: 15.00     Quit date: 1/1/1995     Smokeless tobacco: Never Used     Alcohol use No     Family History   Problem Relation Age of Onset     DIABETES Mother      Hypertension Mother      Arthritis Mother      HEART DISEASE Mother      Lipids Mother      DIABETES Father      CANCER Father      DIABETES Sister      Genitourinary Problems Sister      kidney     HEART DISEASE Sister      Lipids Sister      CANCER Paternal Grandmother 40     melanoma     Family History Negative No family hx of            Reviewed and updated as needed this visit by clinical staff  Tobacco  Allergies  Med Hx  Surg Hx  Fam Hx  Soc Hx      Reviewed and updated as needed this visit by Provider         ROS:  Constitutional, HEENT, cardiovascular, pulmonary, GI, , musculoskeletal, neuro, skin, endocrine and psych systems are negative, except as otherwise noted.    OBJECTIVE:                                                    /73  Pulse 72  Temp 96.5  F (35.8  C) (Tympanic)  Ht 5' 9\" (1.753 m)  Wt 171 lb 6.4 oz (77.7 kg)  BMI 25.31 kg/m2  Body mass index is 25.31 kg/(m^2).  GENERAL: healthy, alert and no distress  NECK: no adenopathy, no asymmetry, masses, or scars and thyroid normal to palpation  RESP: lungs clear to auscultation - no rales, rhonchi or wheezes  CV: regular rate and rhythm, normal S1 S2, no S3 or S4, no murmur, click or rub, no peripheral edema and peripheral pulses strong  MS: no gross musculoskeletal defects noted, no edema  PSYCH: mentation appears normal and tearful    Diagnostic Test Results:  none      ASSESSMENT/PLAN:                                                        1. Hypothyroidism, unspecified type  stable  - levothyroxine (SYNTHROID/LEVOTHROID) 50 MCG tablet; Take 50 mcg on even days, and 25 " mcg (1/2 tab) on odd days.  Dispense: 30 tablet; Refill: 11    2. Hyperlipidemia LDL goal <100  Tolerating statin therapy  - simvastatin (ZOCOR) 20 MG tablet; Take 1 tablet (20 mg) by mouth At Bedtime  Dispense: 30 tablet; Refill: 11  - Lipid Profile with reflex to direct LDL    3. Major depressive disorder, single episode, mild (H)  Increase since CRPS- she is going to pain clinic/psychology therapy   - DULoxetine (CYMBALTA) 20 MG EC capsule; Take 1 capsule (20 mg) by mouth 2 times daily  Dispense: 60 capsule; Refill: 1  - follow up in 2 months for reassessment of symptoms     4. YELENA (generalized anxiety disorder)  Increase since CRPS- she is going to pain clinic/psychology therapy   - DULoxetine (CYMBALTA) 20 MG EC capsule; Take 1 capsule (20 mg) by mouth 2 times daily  Dispense: 60 capsule; Refill: 1    5. Chronic kidney disease, stage 3 (moderate)  stable  - Basic metabolic panel    6. Complex region Pain Syndrome  - follows with Pain clinic        SELWYN Cifuentes CNP  [facility]

## 2017-02-28 NOTE — PATIENT INSTRUCTIONS
1. Take Cymbalta - 1 tablet once a day for 1 week and then if needed can increase to 1 tablet twice a day  2. Follow up in 8 weeks          Thank you for choosing [facility].  You may be receiving a survey in the mail from Megan Foss regarding your visit today.  Please take a few minutes to complete and return the survey to let us know how we are doing.      If you have questions or concerns, please contact us via Moodswing or you can contact your care team at [phone].    Our Clinic hours are:  Monday 6:40 am  to 7:00 pm  Tuesday -Friday 6:40 am to 5:00 pm    The [place] outpatient lab hours are:  Monday - Friday 6:10 am to 4:45 pm  Saturdays 7:00 am to 11:00 am  Appointments are required, call [phone]    If you have clinical questions after hours or would like to schedule an appointment,  call the clinic at [phone].

## 2017-02-28 NOTE — MR AVS SNAPSHOT
After Visit Summary   2/28/2017    Diana Buenrostro    MRN: [id number]           Patient Information     Date Of Birth          1955        Visit Information        Provider Department      2/28/2017 10:00 AM Samantha Dhaliwal APRN CNP [facility]        Today's Diagnoses     Major depressive disorder, single episode, mild (H)    -  1    Hypothyroidism, unspecified type        Hyperlipidemia LDL goal <100        YELENA (generalized anxiety disorder)          Care Instructions    1. Take Cymbalta - 1 tablet once a day for 1 week and then if needed can increase to 1 tablet twice a day  2. Follow up in 8 weeks          Thank you for choosing [facility].  You may be receiving a survey in the mail from [employer] regarding your visit today.  Please take a few minutes to complete and return the survey to let us know how we are doing.      If you have questions or concerns, please contact us via [employer] or you can contact your care team at [phone].    Our Clinic hours are:  Monday 6:40 am  to 7:00 pm  Tuesday -Friday 6:40 am to 5:00 pm    The [place] outpatient lab hours are:  Monday - Friday 6:10 am to 4:45 pm  Saturdays 7:00 am to 11:00 am  Appointments are required, call [phone]    If you have clinical questions after hours or would like to schedule an appointment,  call the clinic at [phone].        Follow-ups after your visit        Your next 10 appointments already scheduled     Mar 01, 2017  9:00 AM CST   Treatment with Yaritza Ugalde OT   [facility] ([facility])    [address] [facility] [address] [address]  [address]  [place] [facility] [place] [address]   [phone]            Mar 02, 2017 11:00 AM CST   Return Visit with SELWYN Gaona CNP   [facility] ([facility])    [address]  [facility] [address]  [place] [id number] [phone]            Mar 07, 2017  4:00 PM CST   Return Visit with Pj Juarez LP  "  [facility] [place] ([facility])    [address]  [place] [address] [phone]              Who to contact     If you have questions or need follow up information about today's clinic visit or your schedule please contact [facility] directly at [phone].  Normal or non-critical lab and imaging results will be communicated to you by MyChart, letter or phone within 4 business days after the clinic has received the results. If you do not hear from us within 7 days, please contact the clinic through Doubloonhart or phone. If you have a critical or abnormal lab result, we will notify you by phone as soon as possible.  Submit refill requests through ROXIMITY or call your pharmacy and they will forward the refill request to us. Please allow 3 business days for your refill to be completed.          Additional Information About Your Visit        Doubloonhar[employer]ichron Information     [employer] gives you secure access to your electronic health record. If you see a primary care provider, you can also send messages to your care team and make appointments. If you have questions, please call your primary care clinic.  If you do not have a primary care provider, please call [phone] and they will assist you.        Care EveryWhere ID     This is your Care EveryWhere ID. This could be used by other organizations to access your [place] medical records  [id number]        Your Vitals Were     Pulse Temperature Height BMI (Body Mass Index)          72 96.5  F (35.8  C) (Tympanic) 5' 9\" (1.753 m) 25.31 kg/m2         Blood Pressure from Last 3 Encounters:   02/28/17 123/73   02/24/17 149/87   01/19/17 146/87    Weight from Last 3 Encounters:   02/28/17 171 lb 6.4 oz (77.7 kg)   01/26/17 168 lb 6.4 oz (76.4 kg)   01/19/17 170 lb (77.1 kg)              We Performed the Following     Lipid Profile with reflex to direct LDL          Today's Medication Changes          These changes are " accurate as of: 2/28/17 10:35 AM.  If you have any questions, ask your nurse or doctor.               Start taking these medicines.        Dose/Directions    DULoxetine 20 MG EC capsule   Commonly known as:  CYMBALTA   Used for:  Major depressive disorder, single episode, mild (H), YELENA (generalized anxiety disorder)   Started by:  Samantha Dhaliwal APRN CNP        Dose:  20 mg   Take 1 capsule (20 mg) by mouth 2 times daily   Quantity:  60 capsule   Refills:  1         These medicines have changed or have updated prescriptions.        Dose/Directions    levothyroxine 50 MCG tablet   Commonly known as:  SYNTHROID/LEVOTHROID   This may have changed:  additional instructions   Used for:  Hypothyroidism, unspecified type   Changed by:  Samantha Dhaliwal APRN CNP        Take 50 mcg on even days, and 25 mcg (1/2 tab) on odd days.   Quantity:  30 tablet   Refills:  11       simvastatin 20 MG tablet   Commonly known as:  ZOCOR   This may have changed:  additional instructions   Used for:  Hyperlipidemia LDL goal <100   Changed by:  Samantha Dhaliwal APRN CNP        Dose:  20 mg   Take 1 tablet (20 mg) by mouth At Bedtime   Quantity:  30 tablet   Refills:  11            Where to get your medicines      [facility] [place] [facility] [address]  [address] [address]    Hours:  [place] [facility] Phone:  [phone]     DULoxetine 20 MG EC capsule    levothyroxine 50 MCG tablet    simvastatin 20 MG tablet                Primary Care Provider Office Phone # Fax #    SELWYN Cifuentes [phone]       [facility] [address] [address]        Thank you!     Thank you for choosing [facility]  for your care. Our goal is always to provide you with excellent care. Hearing back from our patients is one way we can continue to improve our services. Please take a few minutes to complete the  written survey that you may receive in the mail after your visit with us. Thank you!             Your Updated Medication List - Protect others around you: Learn how to safely use, store and throw away your medicines at www.disposemymeds.org.          This list is accurate as of: 2/28/17 10:35 AM.  Always use your most recent med list.                   Brand Name Dispense Instructions for use    diclofenac 1 % Gel topical gel    VOLTAREN    100 g    Apply 2-4 grams to affected area four times daily       DULoxetine 20 MG EC capsule    CYMBALTA    60 capsule    Take 1 capsule (20 mg) by mouth 2 times daily       gabapentin 300 MG capsule    NEURONTIN    180 capsule    600 mg TID       HYDROcodone-acetaminophen 5-325 MG per tablet    NORCO    20 tablet    1/2 to 1 tablet daily as needed for moderate to severe pain. #20 tabs to last 30 days.       levothyroxine 50 MCG tablet    SYNTHROID/LEVOTHROID    30 tablet    Take 50 mcg on even days, and 25 mcg (1/2 tab) on odd days.       lidocaine 5 % ointment    XYLOCAINE    100 g    Apply topically 4 times daily as needed for moderate pain       methocarbamol 500 MG tablet    ROBAXIN    120 tablet    Take 1-2 tablets (500-1,000 mg) by mouth 4 times daily as needed for muscle spasms       simvastatin 20 MG tablet    ZOCOR    30 tablet    Take 1 tablet (20 mg) by mouth At Bedtime       VITAMIN C PO      Take 500 mg by mouth daily

## 2017-03-01 ASSESSMENT — ANXIETY QUESTIONNAIRES: GAD7 TOTAL SCORE: 5

## 2017-03-01 ASSESSMENT — PATIENT HEALTH QUESTIONNAIRE - PHQ9: SUM OF ALL RESPONSES TO PHQ QUESTIONS 1-9: 9

## 2017-03-02 ENCOUNTER — OFFICE VISIT (OUTPATIENT)
Dept: PALLIATIVE MEDICINE | Facility: CLINIC | Age: 62
End: 2017-03-02
Payer: OTHER MISCELLANEOUS

## 2017-03-02 VITALS
DIASTOLIC BLOOD PRESSURE: 83 MMHG | OXYGEN SATURATION: 98 % | HEART RATE: 85 BPM | SYSTOLIC BLOOD PRESSURE: 128 MMHG | RESPIRATION RATE: 16 BRPM

## 2017-03-02 DIAGNOSIS — G90.511 COMPLEX REGIONAL PAIN SYNDROME TYPE 1 OF RIGHT UPPER EXTREMITY: ICD-10-CM

## 2017-03-02 PROCEDURE — 99214 OFFICE O/P EST MOD 30 MIN: CPT | Performed by: NURSE PRACTITIONER

## 2017-03-02 RX ORDER — HYDROCODONE BITARTRATE AND ACETAMINOPHEN 5; 325 MG/1; MG/1
TABLET ORAL
Qty: 20 TABLET | Refills: 0 | Status: SHIPPED | OUTPATIENT
Start: 2017-03-02 | End: 2017-08-01

## 2017-03-02 ASSESSMENT — PAIN SCALES - GENERAL: PAINLEVEL: MODERATE PAIN (4)

## 2017-03-02 NOTE — NURSING NOTE
"Chief Complaint   Patient presents with     Pain       Initial /83  Pulse 85  Resp 16  SpO2 98% Estimated body mass index is 25.31 kg/(m^2) as calculated from the following:    Height as of 2/28/17: 1.753 m (5' 9\").    Weight as of 2/28/17: 77.7 kg (171 lb 6.4 oz).  Medication Reconciliation: complete       Layne Butler MA  [facility]      "

## 2017-03-02 NOTE — LETTER
[facility]  [address]  [address]  [facility] [address]  [phone]          2017      REPORT OF WORK ABILITY  [facility]  [address]  [address]  [facility] [address] [phone]  PATIENT DATA      Employee Name: Diana Buenrostro      : 1955      #: [id number]      Work related injury: Yes  Employer at time of injury: [employer]  Employer contact & phone: [phone]  Employed elsewhere? No  Workers' Compensation Carrier/Managed Care Plan:  [employer]      Today's date: 2017  Date of injury: 12/8/15  Date of first visit: 16      PROVIDER EVALUATION: Please fill in as needed.  Please give copy to employee for employer.  1. Diagnosis: Complex Regional Pain Syndrome affecting right upper extremity  2. Treatment: Medication, nerve blocks (patient will call to discuss in 2 weeks),  OT/PT.  3. Medication: Gabapentin, lidoderm patches, diclofenac gel, Norco, Methocarbamol       NOTE: When ordering a medication, [place] Rules require Work Comp or WC on prescriptions.  4.  Continue with hand therapy  5. Return to work date: no work at this time. Will reevaluate at next clinic visit in 4-6 weeks.       RESTRICTIONS: Unlimited unless listed.  Restrictions apply to home and leisure also.  If work restrictions is not available, the employee is totally disabled.      Maximum Medical Improvement (Date): Unknown  Any Permanent Partial Disability? Deferred to future exam/consult.      Medical Examiner:                    SELWYN Ferguson, NP-C  [employer] [facility]          License or registration: [employer] Practice Registered Nurse       Next appointment: 8weeks

## 2017-03-02 NOTE — LETTER
2017       REPORT OF WORK ABILITY  [facility]  [address]  [address]  [facility] [address] [phone]  PATIENT DATA      Employee Name: Diana Buenrostro      : 1955      #: [id number]      Work related injury: Yes  Employer at time of injury: [employer]  Employer contact & phone: [phone]  Employed elsewhere? No  Workers' Compensation Carrier/Managed Care Plan:  [employer]      Today's date: 2017  Date of injury: 12/8/15  Date of first visit: 16      PROVIDER EVALUATION: Please fill in as needed.  Please give copy to employee for employer.  1. Diagnosis: Complex Regional Pain Syndrome affecting right upper extremity  2. Treatment: Medication, nerve blocks (patient will call to discuss in 2 weeks),  OT/PT.  3. Medication: Gabapentin, lidoderm patches, diclofenac gel, Norco, Methocarbamol       NOTE: When ordering a medication, [place] Rules require [place] or WC on prescriptions.  4.  Continue with hand therapy  5. Return to work date: no work at this time. Will reevaluate at next clinic visit in  8 weeks.       RESTRICTIONS: Unlimited unless listed.  Restrictions apply to home and leisure also.  If work restrictions is not available, the employee is totally disabled.      Maximum Medical Improvement (Date): Unknown  Any Permanent Partial Disability? Deferred to future exam/consult.      Medical Examiner:                SELWYN Ferguson, NP-C  [employer] [facility]          License or registration: [employer] Registered Nurse       Next appointment: 8  weeks

## 2017-03-02 NOTE — PROGRESS NOTES
"[facility]    CHIEF COMPLAINT: Wrist pain     INTERVAL HISTORY:  Last seen on 17.        Recommendations/plan at the last visit included:  1. Schedule occupational/hand therapy visits  2. Schedule follow-up with SELWYN Ferguson NP-C in 4-6 weeks  3. Call Dr Curtis to have the swelling in your left wrist evaluated.   4. Procedures recommended: Call or send message in 2 weeks to discuss add'l block injections.     Interventional procedures  are done at our [place] and [place] locations.   5. Medication recommendations:   1. Methocarbamol 500 m-2 tablets up to 4 times daily for spasms    Since her last visit, Diana DIANNE Buenrostro reports:  - Had Stellate ganglion block on 17. Feels like injection has \"warmed up\" her arm. Sensitively has reduced since injection.     Pain Information:   Pain quality: Burning, Exhausting, Numb and Throbbing    Pain timing: Constant     Pain rating: intensity ranges from 4/10 to 8/10, and averages 5/10 on a 0-10 scale.   Aggravating factors include: PT, Pain medications   Relieving factors include: using upper extremities      Annual Controlled Substance Agreement/UDS due date: 10/2017    CURRENT RELEVANT PAIN MEDICATIONS:   Lidocaine 5% ointment  Diclofenac gel apply to affected area 3-4 times daily  Norco 5/325 mg: Half to 1 tablet daily as needed for severe pain. #20 tablets to last 30 days    Patient is using the medication as prescribed:  YES  Is your medication helpful? YES, but doesn't last very long   Medication side effects? denies any problems      Previous Pain Relevant Medications: (H--helped; HI--Helped initially; SWH--Somewhat helpful; NH--No help; W--worse; SE--side effects; ?--Unsure if helpful)   NOTE: This medication information taken from patient's intake form, not medical records.    Opiates: Norco: H post op   NSAIDS: none: only has one kidney, donated kidney to her sister    Muscle Relaxants: none   Anti-migraine mediations: " none   Anti-depressants: none   Sleep aids:none   Anti-convulsants: none    Topicals: none   Other medications not covered above: Tylenol: [facility]    Any illicit drug use: no  Any use of prescriptions other than how they were prescribed:no  [place] Board of Pharmacy Data Base Reviewed:    YES; No concern for abuse or misuse of controlled medications based on this report.       Medications:  Current Outpatient Prescriptions   Medication Sig Dispense Refill     levothyroxine (SYNTHROID/LEVOTHROID) 50 MCG tablet Take 50 mcg on even days, and 25 mcg (1/2 tab) on odd days. 30 tablet 11     simvastatin (ZOCOR) 20 MG tablet Take 1 tablet (20 mg) by mouth At Bedtime 30 tablet 11     DULoxetine (CYMBALTA) 20 MG EC capsule Take 1 capsule (20 mg) by mouth 2 times daily 60 capsule 1     gabapentin (NEURONTIN) 300 MG capsule 600 mg  capsule 1     methocarbamol (ROBAXIN) 500 MG tablet Take 1-2 tablets (500-1,000 mg) by mouth 4 times daily as needed for muscle spasms 120 tablet 1     HYDROcodone-acetaminophen (NORCO) 5-325 MG per tablet 1/2 to 1 tablet daily as needed for moderate to severe pain. #20 tabs to last 30 days. 20 tablet 0     diclofenac (VOLTAREN) 1 % GEL Apply 2-4 grams to affected area four times daily 100 g 1     lidocaine (XYLOCAINE) 5 % ointment Apply topically 4 times daily as needed for moderate pain 100 g 1     Ascorbic Acid (VITAMIN C PO) Take 500 mg by mouth daily         Review of Systems: A 10-point review of systems was negative, with the exception of chronic pain issues.      Social History: Reviewed; unchanged from initial consultation.      Family history: Reviewed; unchanged from initial consultation.     PHYSICAL EXAM    Vitals:    03/02/17 1051   BP: 128/83   Pulse: 85   Resp: 16   SpO2: 98%       Constitutional: healthy, alert and mild distress, tearful  HEENT: Head atraumatic, normocephalic. Eyes without conjunctival injection or jaundice. Neck supple. No obvious neck masses.  Skin: No rash,  lesions, or petechiae of exposed skin.   Extremities: Peripheral pulses intact. No clubbing, cyanosis, or edema. Affected extremity is cooler than opposite extremity. No coloration difference today. Diminished strength.  Psychiatric/mental status: Alert, without lethargy or stupor. Appropriate affect. Mood anxious, tearful at times  Neurologic exam:  CN:  Cranial nerves 2-12 are grossly normal.  Motor:  3/5 RUE, 5/5 LUE    Sensory exam:   Light touch: Painful in right upper extremity    RUE allodynia    DIRE Score for ongoing opioid management is calculated as follows:    Diagnosis = 2 pts (slowly progressive; moderate pain/objective findings)    Intractability = 3 pts (patient fully engaged but inadequate response to treatments)    Risk        Psych = 3 pts (no significant personality dysfunction/mental illness; good communication with clinic)         Chem Hlth = 3 pts (no history of chemical dependency; not drug-focused)       Reliability = 3 pts (highly reliable with meds, appointments, treatments)       Social = 3 pts (supportive family/close relationships; involved in work/school; no isolation)       (Psych + Chem hlth + Reliability + Social) = 17    Efficacy = 2 pts (moderate benefit/function; low med dose; too early/not tried meds)    DIRE Score = 19        7-13: likely NOT suitable candidate for long-term opioid analgesia       14-21: may be a suitable candidate for long-term opioid analgesia    DIAGNOSTIC TESTS:  Imaging Studies:   No new imaging    Assessment:   1.  CRPS affecting the right upper extremity  2.  Ganglion cyst, left wrist.   3.  Depression, adjustment disorder    Diana presents in the company of her [employer] rep for f/u on work comp related injury resulting in CRPS of right upper extremity. She also has a ganglion cyst in the left wrist. This has been evaluated by Dr Benny Curtis and in not thought to be related to CRPS. She notes increasing depression and difficulty adjusting to the possibility  that she may be living with CRPS long term, possibly permanently. Reviewed that I would like her to be seeing health psychology more regularly to address mood swings, adjustment issues, and depression. She is on Cymbalta. Unsure if this is beneficial at this point. Will explore further at next next.     Diana has a tendency to stop injections whenever she starts to feel a bit better. Per her report she has had noticeable reduction in pain and allodynia with this most recent injection. I have asked her to schedule three stellate ganglion block 2-3 weeks apart to see if we can capture longer lasting relief in symptoms. Continue with OT and health psychology. No change to current medication regimen.     Plan:    Diagnosis reviewed, treatment option addressed, and risk/benifits discussed.  Self-care instructions given.  I am recommending a multidisciplinary treatment plan to help this patient better manage pain.      1.  Schedule pain psychology visits. Reconsider Cymbalta at next visit.    2.  Continue occupational therapy visits   3.  Schedule follow-up with SELWYN Ferguson NP-C in 8 weeks  4.  Procedures recommended: Continue Stellate Ganglion Blocks every 2-3 weeks.    5.  Medication recommendations: No changes to current medication regimen.      Total time spent face to face was 30 minutes and more than 50% of face to face time was spent in counseling and/or coordination of care regarding the diagnosis and recommendations above.      SELWYN Romo, NP-C   [facility]

## 2017-03-02 NOTE — LETTER
[facility]  [address]  [address]  [facility] [address]  [phone]     2017     REPORT OF WORK ABILITY  [facility] [place]  [address]  [address]  [facility] [id number] [phone]  PATIENT DATA     Employee Name: Diana Buenrostro      : 1955      #: [id number]     Work related injury: Yes  Employer at time of injury: [employer]  Employer contact & phone: [phone]  Employed elsewhere? No  Workers' Compensation Carrier/Managed Care Plan:  [employer]     Today's date: 2017  Date of injury: 12/8/15  Date of first visit: 16     PROVIDER EVALUATION: Please fill in as needed.  Please give copy to employee for employer.  1. Diagnosis: Complex Regional Pain Syndrome affecting right upper extremity  2. Treatment: Medication, nerve blocks (patient will call to discuss in 2 weeks),  OT/PT.  3. Medication: Gabapentin, lidoderm patches, diclofenac gel, Norco, Methocarbamol      NOTE: When ordering a medication, [place] Rules require Work Comp or WC on prescriptions.  4.  Continue with hand therapy  5. Return to work date: no work at this time. Will reevaluate at next clinic visit in 4-6 weeks.      RESTRICTIONS: Unlimited unless listed.  Restrictions apply to home and leisure also.  If work restrictions is not available, the employee is totally disabled.     Maximum Medical Improvement (Date): Unknown  Any Permanent Partial Disability? Deferred to future exam/consult.     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

## 2017-03-02 NOTE — PATIENT INSTRUCTIONS
After Visit Instructions:     Thank you for coming to [facility] for your care. It is my goal to partner with you to help you reach your optimal state of health.     I am recommending multidisciplinary care at this time.  The focus of care will be to continue gradual rehabilitation and pain management with medication adjustments as needed.    Continue daily self-care, identifying contributing factors, and monitoring variations in pain level. Continue to integrate self-care into your life.      1. Schedule pain psychology visits   2. Continue occupational therapy visits   3. Schedule follow-up with SELWYN Ferguson NP-C in 8 weeks  4. Procedures recommended: Continue Stellate Ganglion Blocks every 2-3 weeks.    5. Medication recommendations: No changes to current medication regimen.       SELWYN Romo NP-C  [facility]    Contact information: [facility]    Please call if any side effects, questions, or concerns arise.    Nurse Triage line:  [phone]   Call this number with any questions or concerns. You may leave a detailed message anytime. Calls are typically returned Monday through Friday between 8 AM and 4:30 PM. We usually get back to you within 2 business days depending on the issue/request.    Scheduling number: [phone].  Call this number to schedule or change appointments.          Medication Refills Policy:    For non-narcotic medications, please your pharmacy directly to request a refill and the pharmacy will call the Pain Management Center for authorization. Please allow 3-4 days for these refills.    For narcotic refills, call the nurse triage line and leave a message requesting your refill along with the name of the pharmacy that you use. Narcotic prescriptions will be mailed to your pharmacy or you may pick them up at the clinic.  Please call 7-10 days before your refill is due  The above policy allows  adequate time so that you do not run out of medication.    No Show - Late Cancellation - Late Arrival Policy  We believe regular attendance is key to your success in our program.    Any time you are unable to keep your appointment we ask that you call us at  least 24 hours in advance to let us know. This will allow us to offer the appointment time to another patient. The following is our policy for missed appointments. This also applies to appointments cancelled with less than 24 hours notice.    You will receive a letter after missing your 1st and 2nd appointments without contacting the clinic before your scheduled appointment time.     After missing 3 appointments without calling first, we will cancel all of your future appointments at [facility].    At that point, you will not be able to resume services unless approved by your care team  We understand that unforseen circumstances arise, however, out of respect for all concerned and to provide this appointment to another patient, this policy will be enforced.    Please note that most follow up appointments are 30 minutes long. If you arrive late, your provider may not be able to see you for the entire 30 minutes. Please also note that if you arrive more than 15 minutes for any appointment, it may be rescheduled.

## 2017-03-02 NOTE — MR AVS SNAPSHOT
After Visit Summary   3/2/2017    Diana Buenrostro    MRN: [id number]           Patient Information     Date Of Birth          1955        Visit Information        Provider Department      3/2/2017 11:00 AM Mallory Mayo APRN CNP [facility]        Today's Diagnoses     Complex regional pain syndrome type 1 of right upper extremity          Care Instructions    After Visit Instructions:     Thank you for coming to [facility] for your care. It is my goal to partner with you to help you reach your optimal state of health.     I am recommending multidisciplinary care at this time.  The focus of care will be to continue gradual rehabilitation and pain management with medication adjustments as needed.    Continue daily self-care, identifying contributing factors, and monitoring variations in pain level. Continue to integrate self-care into your life.      1. Schedule pain psychology visits   2. Continue occupational therapy visits   3. Schedule follow-up with SELWYN Ferguson NP-C in 8 weeks  4. Procedures recommended: Continue Stellate Ganglion Blocks every 2-3 weeks.    5. Medication recommendations: No changes to current medication regimen.       SELWYN Romo, NPTalaC  [facility]    Contact information: [facility]    Please call if any side effects, questions, or concerns arise.    Nurse Triage line:  [phone]   Call this number with any questions or concerns. You may leave a detailed message anytime. Calls are typically returned Monday through Friday between 8 AM and 4:30 PM. We usually get back to you within 2 business days depending on the issue/request.    Scheduling number: [phone].  Call this number to schedule or change appointments.          Medication Refills Policy:    For non-narcotic medications, please your pharmacy directly to request a refill and the  pharmacy will call the [facility] Management Center for authorization. Please allow 3-4 days for these refills.    For narcotic refills, call the nurse triage line and leave a message requesting your refill along with the name of the pharmacy that you use. Narcotic prescriptions will be mailed to your pharmacy or you may pick them up at the clinic.  Please call 7-10 days before your refill is due  The above policy allows adequate time so that you do not run out of medication.    No Show - Late Cancellation - Late Arrival Policy  We believe regular attendance is key to your success in our program.    Any time you are unable to keep your appointment we ask that you call us at  least 24 hours in advance to let us know. This will allow us to offer the appointment time to another patient. The following is our policy for missed appointments. This also applies to appointments cancelled with less than 24 hours notice.    You will receive a letter after missing your 1st and 2nd appointments without contacting the clinic before your scheduled appointment time.     After missing 3 appointments without calling first, we will cancel all of your future appointments at [facility].    At that point, you will not be able to resume services unless approved by your care team  We understand that unforseen circumstances arise, however, out of respect for all concerned and to provide this appointment to another patient, this policy will be enforced.    Please note that most follow up appointments are 30 minutes long. If you arrive late, your provider may not be able to see you for the entire 30 minutes. Please also note that if you arrive more than 15 minutes for any appointment, it may be rescheduled.                                   Follow-ups after your visit        Your next 10 appointments already scheduled     Mar 07, 2017  4:00 PM CST   Return Visit with Pj Juarez LP   [facility] [place] ([facility] [place])    [address]  [place] [address] [phone]              Who to contact     If you have questions or need follow up information about today's clinic visit or your schedule please contact [facility] directly at [phone].  Normal or non-critical lab and imaging results will be communicated to you by MyChart, letter or phone within 4 business days after the clinic has received the results. If you do not hear from us within 7 days, please contact the clinic through [employer] or phone. If you have a critical or abnormal lab result, we will notify you by phone as soon as possible.  Submit refill requests through [employer] or call your pharmacy and they will forward the refill request to us. Please allow 3 business days for your refill to be completed.          Additional Information About Your Visit        MyChart Information     [employer] gives you secure access to your electronic health record. If you see a primary care provider, you can also send messages to your care team and make appointments. If you have questions, please call your primary care clinic.  If you do not have a primary care provider, please call [phone] and they will assist you.        Care EveryWhere ID     This is your Care EveryWhere ID. This could be used by other organizations to access your [place] medical records  [id number]        Your Vitals Were     Pulse Respirations Pulse Oximetry             85 16 98%          Blood Pressure from Last 3 Encounters:   03/02/17 128/83   02/28/17 123/73   02/24/17 149/87    Weight from Last 3 Encounters:   02/28/17 77.7 kg (171 lb 6.4 oz)   01/26/17 76.4 kg (168 lb 6.4 oz)   01/19/17 77.1 kg (170 lb)              Today, you had the following     No orders found for display         Where to get your medicines      Some of these will need a paper prescription and others can be bought over the counter.  Ask your nurse if you have  questions.     Bring a paper prescription for each of these medications     HYDROcodone-acetaminophen 5-325 MG per tablet          Primary Care Provider Office Phone # Fax #    SELWYN Cifuentes [facility] [phone]       [facility] [address] [facility] [address]        Thank you!     Thank you for choosing [facility]  for your care. Our goal is always to provide you with excellent care. Hearing back from our patients is one way we can continue to improve our services. Please take a few minutes to complete the written survey that you may receive in the mail after your visit with us. Thank you!             Your Updated Medication List - Protect others around you: Learn how to safely use, store and throw away your medicines at www.disposemymeds.org.          This list is accurate as of: 3/2/17 11:29 AM.  Always use your most recent med list.                   Brand Name Dispense Instructions for use    diclofenac 1 % Gel topical gel    VOLTAREN    100 g    Apply 2-4 grams to affected area four times daily       DULoxetine 20 MG EC capsule    CYMBALTA    60 capsule    Take 1 capsule (20 mg) by mouth 2 times daily       gabapentin 300 MG capsule    NEURONTIN    180 capsule    600 mg TID       HYDROcodone-acetaminophen 5-325 MG per tablet    NORCO    20 tablet    1/2 to 1 tablet daily as needed for moderate to severe pain. #20 tabs to last 30 days.       levothyroxine 50 MCG tablet    SYNTHROID/LEVOTHROID    30 tablet    Take 50 mcg on even days, and 25 mcg (1/2 tab) on odd days.       lidocaine 5 % ointment    XYLOCAINE    100 g    Apply topically 4 times daily as needed for moderate pain       methocarbamol 500 MG tablet    ROBAXIN    120 tablet    Take 1-2 tablets (500-1,000 mg) by mouth 4 times daily as needed for muscle spasms       simvastatin 20 MG tablet    ZOCOR    30 tablet    Take 1 tablet (20 mg) by mouth At Bedtime       VITAMIN C PO      Take 500 mg by mouth  daily

## 2017-03-03 ENCOUNTER — TELEPHONE (OUTPATIENT)
Dept: PALLIATIVE MEDICINE | Facility: CLINIC | Age: 62
End: 2017-03-03

## 2017-03-03 NOTE — TELEPHONE ENCOUNTER
Patient had a stellate ganglion injection on 2/24/17.  Called patient for an update.    Left message that we were calling for an update about how s/he was doing after the injection.  LM that if s/he has any problems or questions to call the nurse line at [phone].     Vicky Lai RT (R)

## 2017-03-06 DIAGNOSIS — G57.71 COMPLEX REGIONAL PAIN SYNDROME TYPE 2 OF RIGHT LOWER EXTREMITY: ICD-10-CM

## 2017-03-06 PROBLEM — F32.0 MAJOR DEPRESSIVE DISORDER, SINGLE EPISODE, MILD (H): Status: ACTIVE | Noted: 2017-03-06

## 2017-03-06 PROBLEM — F41.1 GAD (GENERALIZED ANXIETY DISORDER): Status: ACTIVE | Noted: 2017-03-06

## 2017-03-06 RX ORDER — GABAPENTIN 300 MG/1
CAPSULE ORAL
Qty: 180 CAPSULE | Refills: 1 | Status: SHIPPED | OUTPATIENT
Start: 2017-03-06 | End: 2017-04-26

## 2017-03-06 NOTE — TELEPHONE ENCOUNTER
Received fax request from [facility] pharmacy requesting refill(s) for neurontin    Last refilled on 3/6/17    Pt last seen on 3/2/17  Next appt scheduled for 4/26/17    Will facilitate refill.

## 2017-03-07 ENCOUNTER — OFFICE VISIT (OUTPATIENT)
Dept: PALLIATIVE MEDICINE | Facility: CLINIC | Age: 62
End: 2017-03-07
Payer: OTHER MISCELLANEOUS

## 2017-03-07 DIAGNOSIS — G57.71 COMPLEX REGIONAL PAIN SYNDROME TYPE 2 OF RIGHT LOWER EXTREMITY: Primary | ICD-10-CM

## 2017-03-07 PROCEDURE — 96152 HC HEALTH AND BEHAVIOR INTERVENTION, INDIVIDUAL, EACH 15 MINUTES: CPT | Performed by: PSYCHOLOGIST

## 2017-03-07 NOTE — MR AVS SNAPSHOT
After Visit Summary   3/7/2017    Diana Buenrostro    MRN: [id number]           Patient Information     Date Of Birth          1955        Visit Information        Provider Department      3/7/2017 4:00 PM Pj Juarez LP [facility] [place]        Today's Diagnoses     Complex regional pain syndrome type 2 of right lower extremity    -  1       Follow-ups after your visit        Your next 10 appointments already scheduled     Mar 13, 2017 10:30 AM CDT   Treatment with Keri Thurnbeck, OT   [facility] Occupational Therapy ([facility])    [address]  [address]  [facility] [address]   [phone]            Mar 15, 2017 10:30 AM CDT   Treatment with Keri Thurnbeck, OT   [facility] Occupational Therapy ([facility])    [address]  [address]  [facility] [address]   [phone]            Apr 26, 2017 11:00 AM CDT   Return Visit with SELWYN Gaona CNP   [facility] ([facility])    [address]  [address]  [facility] [address] [phone]              Who to contact     If you have questions or need follow up information about today's clinic visit or your schedule please contact [facility] [place] directly at [phone].  Normal or non-critical lab and imaging results will be communicated to you by MyChart, letter or phone within 4 business days after the clinic has received the results. If you do not hear from us within 7 days, please contact the clinic through MyChart or phone. If you have a critical or abnormal lab result, we will notify you by phone as soon as possible.  Submit refill requests through [employer] or call your pharmacy and they will forward the refill request to us. Please allow 3 business days for your refill to be completed.          Additional Information About Your Visit        [employer] Information     [employer] gives you secure access to your electronic  health record. If you see a primary care provider, you can also send messages to your care team and make appointments. If you have questions, please call your primary care clinic.  If you do not have a primary care provider, please call [phone] and they will assist you.        Care EveryWhere ID     This is your Care EveryWhere ID. This could be used by other organizations to access your [facility] medical records  [id number]         Blood Pressure from Last 3 Encounters:   03/02/17 128/83   02/28/17 123/73   02/24/17 149/87    Weight from Last 3 Encounters:   02/28/17 77.7 kg (171 lb 6.4 oz)   01/26/17 76.4 kg (168 lb 6.4 oz)   01/19/17 77.1 kg (170 lb)              We Performed the Following     HEALTH & BEHAVIOR ASSESS, INITIAL, JASON 15MIN PHD        Primary Care Provider Office Phone # Fax #    Samantha Olsen SELWYN Dhaliwal [facility] [phone]       [address] [address]        Thank you!     Thank you for choosing [facility]  for your care. Our goal is always to provide you with excellent care. Hearing back from our patients is one way we can continue to improve our services. Please take a few minutes to complete the written survey that you may receive in the mail after your visit with us. Thank you!             Your Updated Medication List - Protect others around you: Learn how to safely use, store and throw away your medicines at www.disposemymeds.org.          This list is accurate as of: 3/7/17 11:59 PM.  Always use your most recent med list.                   Brand Name Dispense Instructions for use    diclofenac 1 % Gel topical gel    VOLTAREN    100 g    Apply 2-4 grams to affected area four times daily       DULoxetine 20 MG EC capsule    CYMBALTA    60 capsule    Take 1 capsule (20 mg) by mouth 2 times daily       gabapentin 300 MG capsule    NEURONTIN    180 capsule    600 mg TID       HYDROcodone-acetaminophen 5-325 MG per tablet    NORCO    20  tablet    1/2 to 1 tablet daily as needed for moderate to severe pain. #20 tabs to last 30 days.       levothyroxine 50 MCG tablet    SYNTHROID/LEVOTHROID    30 tablet    Take 50 mcg on even days, and 25 mcg (1/2 tab) on odd days.       lidocaine 5 % ointment    XYLOCAINE    100 g    Apply topically 4 times daily as needed for moderate pain       methocarbamol 500 MG tablet    ROBAXIN    120 tablet    Take 1-2 tablets (500-1,000 mg) by mouth 4 times daily as needed for muscle spasms       simvastatin 20 MG tablet    ZOCOR    30 tablet    Take 1 tablet (20 mg) by mouth At Bedtime       VITAMIN C PO      Take 500 mg by mouth daily

## 2017-03-08 NOTE — PROGRESS NOTES
[facility]   [facility] [place]  Behavioral Medicine Visit     Patient Name: Diana Buenrostro    YOB: 1955   Medical Record Number: [id number]  Date: 3/817/2017         SUBJECTIVE:  Session objective: In for visit, Her affect is much improved. She reports she has had positive response to her last injection and she is hopeful. She reports she did consult with her PCP and began Cymbalta. She has been taking that for about  One week    Today Diana asks many questions about anti-depressant treatment so we discussed several topics such as side effects, how to determine if she is improving in mood, how would she go forward with adjustments, how long would she typically take the medication.     She reports she will return with return focus to pain modulation in two weeks           OBJECTIVE:  Length of Visit: 45 minutes      Mental status:  alert, minimal to mild distress cooperative.     ASSESSMENT:   Axis I: Complex Regional Pain Type 2 lower right extremity and upper right extremity.   Axis II: Dx deferred     Progress toward goals: fair.      Pain Status: improved   Emotional Status: improved  Medication / chemical use concerns: None     PLAN:   Next Appointment: Diana Buenrostro will schedule a follow-up appointment in 2-3 weeks.  Assignment: Continue medication for pain and mood. Continue to work on pain cognitions and relaxation strategies Objectives / interventions for next session: continue with treatment plan     Pj Juarez MA, LP, [facility]  Pain Specialist  [facility]

## 2017-03-13 ENCOUNTER — HOSPITAL ENCOUNTER (OUTPATIENT)
Dept: OCCUPATIONAL THERAPY | Facility: CLINIC | Age: 62
Setting detail: THERAPIES SERIES
End: 2017-03-13
Attending: NURSE PRACTITIONER
Payer: OTHER MISCELLANEOUS

## 2017-03-13 PROCEDURE — 97022 WHIRLPOOL THERAPY: CPT | Mod: GO | Performed by: OCCUPATIONAL THERAPIST

## 2017-03-13 PROCEDURE — 97112 NEUROMUSCULAR REEDUCATION: CPT | Mod: GO | Performed by: OCCUPATIONAL THERAPIST

## 2017-03-13 PROCEDURE — 40000839 ZZH STATISTIC HAND THERAPY VISIT: Performed by: OCCUPATIONAL THERAPIST

## 2017-03-15 ENCOUNTER — HOSPITAL ENCOUNTER (OUTPATIENT)
Dept: OCCUPATIONAL THERAPY | Facility: CLINIC | Age: 62
Setting detail: THERAPIES SERIES
End: 2017-03-15
Attending: NURSE PRACTITIONER
Payer: OTHER MISCELLANEOUS

## 2017-03-15 PROCEDURE — 97022 WHIRLPOOL THERAPY: CPT | Mod: GO | Performed by: OCCUPATIONAL THERAPIST

## 2017-03-15 PROCEDURE — 40000839 ZZH STATISTIC HAND THERAPY VISIT: Performed by: OCCUPATIONAL THERAPIST

## 2017-03-15 PROCEDURE — 97110 THERAPEUTIC EXERCISES: CPT | Mod: GO | Performed by: OCCUPATIONAL THERAPIST

## 2017-03-20 ENCOUNTER — HOSPITAL ENCOUNTER (OUTPATIENT)
Dept: OCCUPATIONAL THERAPY | Facility: CLINIC | Age: 62
Setting detail: THERAPIES SERIES
End: 2017-03-20
Attending: NURSE PRACTITIONER
Payer: OTHER MISCELLANEOUS

## 2017-03-20 PROCEDURE — 40000839 ZZH STATISTIC HAND THERAPY VISIT: Performed by: OCCUPATIONAL THERAPIST

## 2017-03-20 PROCEDURE — 97110 THERAPEUTIC EXERCISES: CPT | Mod: GO | Performed by: OCCUPATIONAL THERAPIST

## 2017-03-20 PROCEDURE — 97022 WHIRLPOOL THERAPY: CPT | Mod: GO | Performed by: OCCUPATIONAL THERAPIST

## 2017-03-27 ENCOUNTER — HOSPITAL ENCOUNTER (OUTPATIENT)
Dept: OCCUPATIONAL THERAPY | Facility: CLINIC | Age: 62
Setting detail: THERAPIES SERIES
End: 2017-03-27
Attending: NURSE PRACTITIONER
Payer: OTHER MISCELLANEOUS

## 2017-03-27 PROCEDURE — 97110 THERAPEUTIC EXERCISES: CPT | Mod: GO | Performed by: OCCUPATIONAL THERAPIST

## 2017-03-27 PROCEDURE — 40000839 ZZH STATISTIC HAND THERAPY VISIT: Performed by: OCCUPATIONAL THERAPIST

## 2017-03-27 NOTE — PROGRESS NOTES
03/27/17 0500   Notes   Note Type Progress Note   Providers   Providers Keri Ramsey OTR/L, CHT   Referring Physician Mallory Mayo CNP   Reporting Period   Reporting period from 01/20/17   Reporting period to 03/27/17   General Information   Rxs Authorized 36   Rxs Used 31 ( 11 x since last progress note)   Medical Diagnosis Right CRPS   Orders Evaluate And Treat As Indicated   Insurance WC   Start Of Care Date 10/26/16   Onset date of current episode/exacerbation 12/08/15   Surgical procedure ORIF   Date of surgery 12/10/15   Date for Next MD Appointment 04/26/17  (shot on 14th , MD on 15th)   Other pertinent information April 3rd next block scheduled   Subjective Measures   Subjective Wasn't feeling well and had to cancel last week. Reports she did not sleep well last night dome do to pain.   Initial Pain level 10/10  (at worst and 3 at best)   Current Pain level 5/10  (today 3 best, 7 worst)   Patient Reported % Functional Improvement 20% in the last month   Functional Improvement Reported Self care activities  (hard to qualify as everyday is so different)   QuickDASH [Functional Disability Questionnaire; 0-100 (0=no dysfunction; 100=dysfunction)] (UEFI was 35 now 45/80)   Objective Measures   Objective Measures Strength;Objective Measure 1;Objective Measure 2;Objective Measure 3   Edema   Location (anatomical) DWC   Location Right;Left   Edema Comments 16.5,16.5   ROM   ROM Comments full ROM   Strength   Location Right;Left    10#,12#,15# ( Was 10 last prgress not) left 25,39,37 was 20 last proress note   Maya Pinch 6,10,10# (was 6 # last progress note),10,10,10( Was10# last progress note)   Lateral Pinch 10,6,10# ( was 10#), left 8,12,12# (was 10#)   Objective Measure 1   Objective Measure hypersensitivity wrist   Details mod to severe   Modalities   Fluidotherapy -Duration (5 min)   Therapeutic Exercise   Therapeutic Exercise Other Exercises/Activities   Skilled Interventions (verbal and physical  cuing)   Minutes of Treatment 23 ( review home program today)   Other Exer/Activities/Educ   Exercise 1 Find pennies inlight putty   Description 1 6   Hand Goals   Hand Goals Household Chores;Sports/Recreation;Hygiene/Toileting;Sleeping   Hygiene/Toileting   Current Functional Task Brushing/combing hair  (washing hair)   Previous Performance Level Moderate limitations   Current Performance Level Moderate difficulty  (mild on  a good)   Goal Target Task (wash hair)   Goal Target Performance Level No difficulty   Due Date 05/09/17   If goal not met, Why? progressing   Household Chores   Current Functional Task Carrying;Gripping   Previous Performance Level Severe limitations   Current Performance Level Severe difficulty  (with carrying - moderate with opening jar)   Goal Target Task Carry a shopping bag;Open a tight or new jar   Goal Target Performance Level Mild difficulty   Due Date 05/09/17   If goal not met, Why? progressing   Sleeping   Current Functional Task Sleeping through the night   Previous Performance Level Moderate difficulty   Current Performance Level Moderate difficulty   Goal Target Task (to sleep 7-8 hours a night without wakeing)   Goal Target Performance Level Mild difficulty   Due Date 04/14/17   If goal not met, Why? progressing   Sports/Recreation   Current Functional Task Gripping   Previous Performance Level Severe limitations   Curent Performance Level Severe difficulty;Moderate difficulty  (depening on day can go from mod to severe.)   Goal Target Task (to hold onto treadmill)   Goal Target Performance Level Mild difficulty   Due Date 04/07/17   If goal not met, Why? progressing   Assessment   Clinical Impression(s) Comments Patient has made some nice functional progress over the past 2 months . She still has her good and bad days bu tthe bad days are less and she feels like the blocks are working better.   Response to Therapy: Lack of Improvement Strength   Response to Therapy:  Improvements Pain;Flexibility   Education   Learner Patient   Readiness Eager   Method Booklet/handout;Explanation;Demonstration   Response Verbalizes understanding;Demonstrates understanding   Plan   Home program stretching, weight bearing, desensitization, mirror therapy,  strengthening, functional use   Plan Will focus on improving strength especailly after her next block.   Total Session Time   Total Session Time (In Minutes) 23   Keri Ramsey OTR/L CHT  Occupational Therapist, Certified Hand Therapist

## 2017-03-29 ENCOUNTER — OFFICE VISIT (OUTPATIENT)
Dept: PALLIATIVE MEDICINE | Facility: CLINIC | Age: 62
End: 2017-03-29
Payer: OTHER MISCELLANEOUS

## 2017-03-29 ENCOUNTER — HOSPITAL ENCOUNTER (OUTPATIENT)
Dept: OCCUPATIONAL THERAPY | Facility: CLINIC | Age: 62
Setting detail: THERAPIES SERIES
End: 2017-03-29
Attending: NURSE PRACTITIONER
Payer: OTHER MISCELLANEOUS

## 2017-03-29 DIAGNOSIS — G56.41 COMPLEX REGIONAL PAIN SYNDROME TYPE 2 OF RIGHT UPPER EXTREMITY: ICD-10-CM

## 2017-03-29 DIAGNOSIS — G57.71 COMPLEX REGIONAL PAIN SYNDROME TYPE 2 OF RIGHT LOWER EXTREMITY: Primary | ICD-10-CM

## 2017-03-29 PROCEDURE — 97035 APP MDLTY 1+ULTRASOUND EA 15: CPT | Mod: GO | Performed by: OCCUPATIONAL THERAPIST

## 2017-03-29 PROCEDURE — 96152 HC HEALTH AND BEHAVIOR INTERVENTION, INDIVIDUAL, EACH 15 MINUTES: CPT | Mod: GP | Performed by: PSYCHOLOGIST

## 2017-03-29 PROCEDURE — 40000839 ZZH STATISTIC HAND THERAPY VISIT: Performed by: OCCUPATIONAL THERAPIST

## 2017-03-29 PROCEDURE — 97110 THERAPEUTIC EXERCISES: CPT | Mod: GO | Performed by: OCCUPATIONAL THERAPIST

## 2017-03-29 NOTE — MR AVS SNAPSHOT
After Visit Summary   3/29/2017    Daina Buenrostro    MRN: [id number]           Patient Information     Date Of Birth          1955        Visit Information        Provider Department      3/29/2017 1:00 PM Pj Juarez LP [facility] [place]        Today's Diagnoses     Complex regional pain syndrome type 2 of right lower extremity    -  1    Complex regional pain syndrome type 2 of right upper extremity           Follow-ups after your visit        Your next 10 appointments already scheduled     Apr 03, 2017 10:45 AM CDT   Radiology Injections with Gaurang Pena MD   [facility] [place] ([facility] [place])    [address]  [place] [address]   [phone]            Apr 05, 2017  9:00 AM CDT   Treatment with Keri Ramsey OT   [facility] Occupational Therapy ([facility])    [address]  [address]  [place] [place] [address]   [phone]            Apr 26, 2017 11:00 AM CDT   Return Visit with SELWYN Gaona [facility] ([facility])    [address] [place], [place] [address]  [address]  [facility] [address] [phone]              Who to contact     If you have questions or need follow up information about today's clinic visit or your schedule please contact [facility] [place] directly at [phone].  Normal or non-critical lab and imaging results will be communicated to you by MyChart, letter or phone within 4 business days after the clinic has received the results. If you do not hear from us within 7 days, please contact the clinic through MyChart or phone. If you have a critical or abnormal lab result, we will notify you by phone as soon as possible.  Submit refill requests through [employer] or call your pharmacy and they will forward the refill request to us. Please allow 3 business days for your refill to be completed.          Additional Information About Your Visit         [employer] Information     [employer] gives you secure access to your electronic health record. If you see a primary care provider, you can also send messages to your care team and make appointments. If you have questions, please call your primary care clinic.  If you do not have a primary care provider, please call [phone] and they will assist you.        Care EveryWhere ID     This is your Care EveryWhere ID. This could be used by other organizations to access your [facility] medical records  [id number]         Blood Pressure from Last 3 Encounters:   03/02/17 128/83   02/28/17 123/73   02/24/17 149/87    Weight from Last 3 Encounters:   02/28/17 77.7 kg (171 lb 6.4 oz)   01/26/17 76.4 kg (168 lb 6.4 oz)   01/19/17 77.1 kg (170 lb)              We Performed the Following     HEALTH & BEHAVIOR ASSESS, INITIAL, EA 15MIN PHD        Primary Care Provider Office Phone # Fax #    Samantha SELWYN Roldan [facility] [phone]       [facility] [address] [place] [address]        Thank you!     Thank you for choosing [facility]  for your care. Our goal is always to provide you with excellent care. Hearing back from our patients is one way we can continue to improve our services. Please take a few minutes to complete the written survey that you may receive in the mail after your visit with us. Thank you!             Your Updated Medication List - Protect others around you: Learn how to safely use, store and throw away your medicines at www.disposemymeds.org.          This list is accurate as of: 3/29/17  3:12 PM.  Always use your most recent med list.                   Brand Name Dispense Instructions for use    diclofenac 1 % Gel topical gel    VOLTAREN    100 g    Apply 2-4 grams to affected area four times daily       DULoxetine 20 MG EC capsule    CYMBALTA    60 capsule    Take 1 capsule (20 mg) by mouth 2 times daily       gabapentin 300 MG capsule    NEURONTIN    180 capsule    600  mg TID       HYDROcodone-acetaminophen 5-325 MG per tablet    NORCO    20 tablet    1/2 to 1 tablet daily as needed for moderate to severe pain. #20 tabs to last 30 days.       levothyroxine 50 MCG tablet    SYNTHROID/LEVOTHROID    30 tablet    Take 50 mcg on even days, and 25 mcg (1/2 tab) on odd days.       lidocaine 5 % ointment    XYLOCAINE    100 g    Apply topically 4 times daily as needed for moderate pain       methocarbamol 500 MG tablet    ROBAXIN    120 tablet    Take 1-2 tablets (500-1,000 mg) by mouth 4 times daily as needed for muscle spasms       simvastatin 20 MG tablet    ZOCOR    30 tablet    Take 1 tablet (20 mg) by mouth At Bedtime       VITAMIN C PO      Take 500 mg by mouth daily

## 2017-03-29 NOTE — PROGRESS NOTES
"[facility]   [facility] [place]  Behavioral Medicine Visit      Patient Name: Diana Buenrostro    YOB: 1955   Medical Record Number: [id number]  Date: 3/29/2017          SUBJECTIVE:  Session objective  In for visit, Reports she has had more hopefull feelings as she has continued to experience some relief from her last injection. She notes that with her Dr's approval she has increased her Cymbalta dose just few days ago and had noticed some of the more extreme symptoms of her depression have muted (crying, sleep)     She reports when she sees her providers she wants to convey to them that she is doing okay but admits often she still has a good bit of concern but she strongly suggests to her self it \"should not be there\"  She has increased her frequency of reaching out to friends but has the same concern that she is burdening them. We discussed her being purposeful in seeking help then ending that part of the conversation and doing whatever she might normally do with her friend.    There is no change in her work status but she had begun to think about the possibility of what \"work\" for her might look like going forward               OBJECTIVE:  Length of Visit: 45 minutes       Mental status:  alert, mild distress cooperative.      ASSESSMENT:   Axis I: Complex Regional Pain Type 2 lower right extremity and upper right extremity.   Axis II: Dx deferred      Progress toward goals: fair.       Pain Status: improved   Emotional Status: improved  Medication / chemical use concerns: None      PLAN:   Next Appointment: Diana Buenrostro will schedule a follow-up appointment in 2-3 weeks.  Assignment: Continue medication for pain and mood. Continue to work on pain cognitions and relaxation strategies Objectives / interventions for next session: continue with treatment plan      Pj Juarez MA, LP, [facility]  Pain Specialist  [facility]

## 2017-03-30 ENCOUNTER — MYC MEDICAL ADVICE (OUTPATIENT)
Dept: PALLIATIVE MEDICINE | Facility: CLINIC | Age: 62
End: 2017-03-30

## 2017-03-30 NOTE — TELEPHONE ENCOUNTER
Higinio Tejada,  Just touching base to let you know that things are going better. I still feel like I'am having some good results from the last block and I have another one scheduled on Monday April 3rd.  The PT said that some things have improved. So any improvement is awesome. Thanks for everything.  See you in April.     Diana

## 2017-03-30 NOTE — TELEPHONE ENCOUNTER
Diana,     Thanks for the update! I'm glad to hear things are going a bit better.     Take care,   SELWYN Romo, NP-C  [facility]

## 2017-04-03 ENCOUNTER — RADIANT APPOINTMENT (OUTPATIENT)
Dept: RADIOLOGY | Facility: CLINIC | Age: 62
End: 2017-04-03
Attending: ANESTHESIOLOGY
Payer: OTHER MISCELLANEOUS

## 2017-04-03 ENCOUNTER — RADIOLOGY INJECTION OFFICE VISIT (OUTPATIENT)
Dept: PALLIATIVE MEDICINE | Facility: CLINIC | Age: 62
End: 2017-04-03
Payer: OTHER MISCELLANEOUS

## 2017-04-03 VITALS — DIASTOLIC BLOOD PRESSURE: 80 MMHG | SYSTOLIC BLOOD PRESSURE: 123 MMHG | OXYGEN SATURATION: 100 % | HEART RATE: 78 BPM

## 2017-04-03 DIAGNOSIS — G56.41 COMPLEX REGIONAL PAIN SYNDROME TYPE 2 OF RIGHT UPPER EXTREMITY: Primary | ICD-10-CM

## 2017-04-03 DIAGNOSIS — G57.71 COMPLEX REGIONAL PAIN SYNDROME TYPE 2 OF RIGHT LOWER EXTREMITY: ICD-10-CM

## 2017-04-03 PROCEDURE — 64510 N BLOCK STELLATE GANGLION: CPT | Mod: RT | Performed by: ANESTHESIOLOGY

## 2017-04-03 ASSESSMENT — PAIN SCALES - GENERAL
PAINLEVEL: MODERATE PAIN (4)
PAINLEVEL: MODERATE PAIN (4)

## 2017-04-03 NOTE — MR AVS SNAPSHOT
After Visit Summary   4/3/2017    Diana Buenrostro    MRN: [id number]           Patient Information     Date Of Birth          1955        Visit Information        Provider Department      4/3/2017 10:45 AM Gaurang Tolentino MD [facility]        Care Instructions    [facility]   Procedure Discharge Instructions    Today you saw:  Dr. Gaurang Pena      Stellate Ganglion Block:  You may have one or more of the following: difficulty swallowing, hoarseness, sore throat, mild swelling or bruising or the neck, drooping or redness of the eyelid on the side injected, a very small pupil on the side injected, weakness of the arm on the side injected.  These symptons should go away within 24 hours  If you experience chest pain, shortness of breath or problems breathing go to your local Emergency Room    Do not eat until the lump in your throat goes down (usually about 4 hours)  Do not drink hot drinks for the next 12 hours  You may take small sips of cool drinks or ice chips  Use a sling to protect your arm if needed      Medications used:  Lidocaine  Depo-medrol  Omnipaque  Ropivicaine          Be cautious when walking. Numbness and/or weakness in the lower extremities may occur up to 6-8 hours after the procedure due to effect of the local anesthetic    Do not drive for 6 hours. The effect of the local anesthetic could slow your reflexes.     You may resume your regular activities after 24 hours    Avoid strenuous activity for the first 24 hours    You may shower, however avoid swimming, tub baths or hot tubs for 24 hours following your procedure    You may have a mild to moderate increase in pain for several days following the injection.    It may take up to 14 days for the steroid medication to start working although you may feel the effect as early as a few days after the procedure.       You may use ice packs for 10-15 minutes, 3 to 4 times a day at the  injection site for comfort    Do not use heat to painful areas for 6 to 8 hours. This will give the local anesthetic time to wear off and prevent you from accidentally burning your skin.     You may use anti-inflammatory medications (such as Ibuprofen or Aleve or Advil) or Tylenol for pain control if necessary    If you were fasting, you may resume your normal diet and medications after the procedure    If you experience any of the following, call the pain center nursing line during work hours at [phone] or the after hours provider line at [phone]:  -Fever over 100 degree F  -Swelling, bleeding, redness, drainage, warmth at the injection site  -Progressive weakness or numbness in your legs or arms  -Loss of bowel or bladder function  -Unusual headache that is not relieved by Tylenol  -Unusual new onset of pain that is not improving      Phone #s:  Appointment line: [phone];  Nurse line: [phone]            Follow-ups after your visit        Your next 10 appointments already scheduled     Apr 05, 2017  9:00 AM CDT   Treatment with Keri Ramsey OT   [facility] ([facility])    [address]  [address]  [facility] [address]   [phone]            Apr 26, 2017 11:00 AM CDT   Return Visit with SELWYN Gaona CNP   [facility] ([facility])    [address] [place] [address] [address]  [facility] [address] [phone]              Who to contact     If you have questions or need follow up information about today's clinic visit or your schedule please contact [facility] directly at [phone].  Normal or non-critical lab and imaging results will be communicated to you by MyChart, letter or phone within 4 business days after the clinic has received the results. If you do not hear from us within 7 days, please contact the clinic through MyChart or phone. If you have a critical or abnormal lab result, we will  notify you by phone as soon as possible.  Submit refill requests through Oree or call your pharmacy and they will forward the refill request to us. Please allow 3 business days for your refill to be completed.          Additional Information About Your Visit        [employer]mar[employer]ITH (formerly [employer]) Information     Oree gives you secure access to your electronic health record. If you see a primary care provider, you can also send messages to your care team and make appointments. If you have questions, please call your primary care clinic.  If you do not have a primary care provider, please call [phone] and they will assist you.        Care EveryWhere ID     This is your Care EveryWhere ID. This could be used by other organizations to access your [place] medical records  [id number]        Your Vitals Were     Pulse Pulse Oximetry                78 100%           Blood Pressure from Last 3 Encounters:   04/03/17 123/80   03/02/17 128/83   02/28/17 123/73    Weight from Last 3 Encounters:   02/28/17 77.7 kg (171 lb 6.4 oz)   01/26/17 76.4 kg (168 lb 6.4 oz)   01/19/17 77.1 kg (170 lb)              Today, you had the following     No orders found for display       Primary Care Provider Office Phone # Fax #    Samantha Olsen SELWYN Dhaliwal [facility] [phone]       [facility] [address] [place] [address]        Thank you!     Thank you for choosing [facility]  for your care. Our goal is always to provide you with excellent care. Hearing back from our patients is one way we can continue to improve our services. Please take a few minutes to complete the written survey that you may receive in the mail after your visit with us. Thank you!             Your Updated Medication List - Protect others around you: Learn how to safely use, store and throw away your medicines at www.disposemymeds.org.          This list is accurate as of: 4/3/17 11:06 AM.  Always use your most recent med list.                    Brand Name Dispense Instructions for use    diclofenac 1 % Gel topical gel    VOLTAREN    100 g    Apply 2-4 grams to affected area four times daily       DULoxetine 20 MG EC capsule    CYMBALTA    60 capsule    Take 1 capsule (20 mg) by mouth 2 times daily       gabapentin 300 MG capsule    NEURONTIN    180 capsule    600 mg TID       HYDROcodone-acetaminophen 5-325 MG per tablet    NORCO    20 tablet    1/2 to 1 tablet daily as needed for moderate to severe pain. #20 tabs to last 30 days.       levothyroxine 50 MCG tablet    SYNTHROID/LEVOTHROID    30 tablet    Take 50 mcg on even days, and 25 mcg (1/2 tab) on odd days.       lidocaine 5 % ointment    XYLOCAINE    100 g    Apply topically 4 times daily as needed for moderate pain       methocarbamol 500 MG tablet    ROBAXIN    120 tablet    Take 1-2 tablets (500-1,000 mg) by mouth 4 times daily as needed for muscle spasms       simvastatin 20 MG tablet    ZOCOR    30 tablet    Take 1 tablet (20 mg) by mouth At Bedtime       VITAMIN C PO      Take 500 mg by mouth daily

## 2017-04-03 NOTE — NURSING NOTE
Discharge Information    IV Discontiued Time: 1109 catheter intact    Amount of Fluid Infused:  NA    Discharge Criteria = When patient returns to baseline or as per MD order    Consciousness:  Pt is fully awake    Circulation:  BP +/- 20% of pre-procedure level    Respiration:  Patient is able to breathe deeply    O2 Sat:  Patient is able to maintain O2 Sat >92% on room air    Activity:  Moves 4 extremities on command    Ambulation:  Patient is able to stand and walk or stand and pivot into wheelchair    Dressing:  Clean/dry or No Dressing    Notes:   Discharge instructions and AVS given to patient    Patient meets criteria for discharge?  YES    Admitted to PCU?  No    Responsible adult present to accompany patient home?  Yes    Signature/Title:    Tiana Etienne RN Care Coordinator  [facility]

## 2017-04-03 NOTE — NURSING NOTE
20 gauge Peripheral IV inserted into right anticubital - attempts: 1    Jesusita Etienne RN, [facility]  Pain Clinic Care Coordinator

## 2017-04-03 NOTE — PROGRESS NOTES
Pre procedure Diagnosis: CRPS of the right upper extremity  Post proceudure Diagnosis:Same  Procedure performed: Stellate ganglion block at C7 on the right, under fluorsoscopic guidance, contrast controlled  Anesthesia: local  Complications: none  Operators: Gaurang Pena MD    INDICATIONS:   Diana Buenrostro is a 61 year old female with a h/o CRPS of the right upper extremity.  She was referred by SELWYN Ferguson for a repeat right stellate ganglion block. Her last Stellate ganglion block was performed on 2/24/17 and offered about 4 weeks of significant relief and improved function.  Options, benefits and risks were discussed with the patient including bleeding, infection, hoarseness, droopy face/eye, swallowing problems, no pain relief, seizure, stroke, paralysis, nerve injury, spinal cord injury, spinal headache, spinal fluid leak, coma, death. Questions were answered to her satisfaction and she agrees to proceed. Voluntary informed consent was obtained and signed.  The patient's medical history, medications, and allergies were reviewed and reconciled.    PROCEDURE IN DETAIL:  After obtaining signed informed consent the patient was brought into the procedure suit and placed in a supine position on the procedure table. Patient's neck area was prepped and draped in the usual sterile fashion. The transverse process of C7 on the right side was identified under AP fluoroscopic guidance. Soft tissue of her neck were retracted using my left hand until palpation of transverse process was obtained. 1 ml of 1% lidocaine was injected at the needle entry point and needle tract using a 30 gauge 1 inch needle. Then a 27 gauge 3.5 inch quincke type spinal needle was inserted and slowly advanced under fluoroscopic guidance until bony contact was made. Lateral fluoroscopic guidance was also obtained to confirm the needle depth and position.     After negative aspiration for heme and CSF, 1 cc of Omnipaque contrast dye  was injected. There was no vascular uptake, the contrast was spreading distally along the T1 vertebrae.  Omnipaque wasted:  9 ml.  A test dose was performed by injecting 2 ml of Lidocaine 1% which was negative for adverse effects.  Then I injected 8 ml of a combination of 0.2 % Ropivicaine 7 ml and Depomedrol 40 mg slowly and incrementally with frequent intermittent aspirations. The needle was then restyletted and withdrawn, the patient's neck was cleansed and patient was brought to the recovery area.     In the recovery area patient reported some pain relief at the time of discharge. Patient had hyperemia and increased temperature of the right hand as well as Lili syndrome which is an indication of a successful stellate ganglion block. Patient's pre procedure  pain intensity score was 5/10 and post procedure pain intensity score was 4/10. Patient will continue monitoring progress and we will see the patient again in the clinic for follow up  in 2-3 weeks. The patient was then discharged in good condition with discharge instructions.    Follow up:  With referring provider     Gaurang Pena MD  [facility]

## 2017-04-03 NOTE — NURSING NOTE
"Chief Complaint   Patient presents with     Pain       Initial There were no vitals taken for this visit. Estimated body mass index is 25.31 kg/(m^2) as calculated from the following:    Height as of 2/28/17: 1.753 m (5' 9\").    Weight as of 2/28/17: 77.7 kg (171 lb 6.4 oz).  Medication Reconciliation: complete     Injection intake:    If this procedure is requiring IV sedation has patient been NPO for 6  Hours? NA    Is patient on coumadin, plavix or other prescribed blood thinner?   No    If patient is on coumadin was it held for 5 days?   NA    If patient is on plavix was it held for 7 days?    NA     Does patient take aspirin?  No    If this is for a cervical procedure and patient is on aspirin has it been held for 6 days?   NA    Any allergies to contrast dye, iodine, steroid and/or numbing medications?  NO    Is patient currently taking antibiotics or have an active infection?  NO    Does patient have a ? Yes       Is patient pregnant or breastfeeding?  NO    Are the vital signs normal?  Yes    Kellie Khoury CMA ([facility])      "

## 2017-04-03 NOTE — PATIENT INSTRUCTIONS
[facility]   Procedure Discharge Instructions    Today you saw:  Dr. Gaurang Pena      Stellate Ganglion Block:  You may have one or more of the following: difficulty swallowing, hoarseness, sore throat, mild swelling or bruising or the neck, drooping or redness of the eyelid on the side injected, a very small pupil on the side injected, weakness of the arm on the side injected.  These symptons should go away within 24 hours  If you experience chest pain, shortness of breath or problems breathing go to your local Emergency Room    Do not eat until the lump in your throat goes down (usually about 4 hours)  Do not drink hot drinks for the next 12 hours  You may take small sips of cool drinks or ice chips  Use a sling to protect your arm if needed      Medications used:  Lidocaine  Depo-medrol  Omnipaque  Ropivicaine          Be cautious when walking. Numbness and/or weakness in the lower extremities may occur up to 6-8 hours after the procedure due to effect of the local anesthetic    Do not drive for 6 hours. The effect of the local anesthetic could slow your reflexes.     You may resume your regular activities after 24 hours    Avoid strenuous activity for the first 24 hours    You may shower, however avoid swimming, tub baths or hot tubs for 24 hours following your procedure    You may have a mild to moderate increase in pain for several days following the injection.    It may take up to 14 days for the steroid medication to start working although you may feel the effect as early as a few days after the procedure.       You may use ice packs for 10-15 minutes, 3 to 4 times a day at the injection site for comfort    Do not use heat to painful areas for 6 to 8 hours. This will give the local anesthetic time to wear off and prevent you from accidentally burning your skin.     You may use anti-inflammatory medications (such as Ibuprofen or Aleve or Advil) or Tylenol for pain control if  necessary    If you were fasting, you may resume your normal diet and medications after the procedure    If you experience any of the following, call the pain center nursing line during work hours at [phone] or the after hours provider line at [phone]:  -Fever over 100 degree F  -Swelling, bleeding, redness, drainage, warmth at the injection site  -Progressive weakness or numbness in your legs or arms  -Loss of bowel or bladder function  -Unusual headache that is not relieved by Tylenol  -Unusual new onset of pain that is not improving      Phone #s:  Appointment line: [phone];  Nurse line: [phone]

## 2017-04-05 ENCOUNTER — HOSPITAL ENCOUNTER (OUTPATIENT)
Dept: OCCUPATIONAL THERAPY | Facility: CLINIC | Age: 62
Setting detail: THERAPIES SERIES
End: 2017-04-05
Attending: NURSE PRACTITIONER
Payer: OTHER MISCELLANEOUS

## 2017-04-05 PROCEDURE — 40000839 ZZH STATISTIC HAND THERAPY VISIT: Performed by: OCCUPATIONAL THERAPIST

## 2017-04-05 PROCEDURE — 97022 WHIRLPOOL THERAPY: CPT | Mod: GO | Performed by: OCCUPATIONAL THERAPIST

## 2017-04-19 ENCOUNTER — HOSPITAL ENCOUNTER (OUTPATIENT)
Dept: OCCUPATIONAL THERAPY | Facility: CLINIC | Age: 62
Setting detail: THERAPIES SERIES
End: 2017-04-19
Attending: NURSE PRACTITIONER
Payer: OTHER MISCELLANEOUS

## 2017-04-19 ENCOUNTER — OFFICE VISIT (OUTPATIENT)
Dept: PALLIATIVE MEDICINE | Facility: CLINIC | Age: 62
End: 2017-04-19
Payer: OTHER MISCELLANEOUS

## 2017-04-19 ENCOUNTER — OFFICE VISIT (OUTPATIENT)
Dept: ORTHOPEDICS | Facility: CLINIC | Age: 62
End: 2017-04-19
Payer: OTHER MISCELLANEOUS

## 2017-04-19 VITALS — RESPIRATION RATE: 16 BRPM | HEIGHT: 69 IN | BODY MASS INDEX: 25.68 KG/M2 | WEIGHT: 173.4 LBS

## 2017-04-19 DIAGNOSIS — G56.41 COMPLEX REGIONAL PAIN SYNDROME TYPE 2 OF RIGHT UPPER EXTREMITY: Primary | ICD-10-CM

## 2017-04-19 DIAGNOSIS — M67.439 PALMAR WRIST GANGLION: Primary | ICD-10-CM

## 2017-04-19 PROCEDURE — 99212 OFFICE O/P EST SF 10 MIN: CPT | Performed by: ORTHOPAEDIC SURGERY

## 2017-04-19 PROCEDURE — 97110 THERAPEUTIC EXERCISES: CPT | Mod: GO | Performed by: OCCUPATIONAL THERAPIST

## 2017-04-19 PROCEDURE — 40000839 ZZH STATISTIC HAND THERAPY VISIT: Performed by: OCCUPATIONAL THERAPIST

## 2017-04-19 PROCEDURE — 96152 HC HEALTH AND BEHAVIOR INTERVENTION, INDIVIDUAL, EACH 15 MINUTES: CPT | Performed by: PSYCHOLOGIST

## 2017-04-19 PROCEDURE — 97022 WHIRLPOOL THERAPY: CPT | Mod: GO | Performed by: OCCUPATIONAL THERAPIST

## 2017-04-19 ASSESSMENT — PAIN SCALES - GENERAL: PAINLEVEL: MODERATE PAIN (5)

## 2017-04-19 NOTE — MR AVS SNAPSHOT
After Visit Summary   4/19/2017    Diana Buenrosrto    MRN: [id number]           Patient Information     Date Of Birth          1955        Visit Information        Provider Department      4/19/2017 11:00 AM Benny Curtis MD [facility]        Today's Diagnoses     Palmar wrist ganglion    -  1      Care Instructions    Please remember to call and schedule a follow up appointment if one was recommended at your earliest convenience.  Orthopedics CLINIC HOURS TELEPHONE NUMBER   Dr. Ever Chester  Certified Medical Assistant   Monday & Wednesday   8am - 5pm  Thursday 1pm - 5pm  Friday 8am -11:30am Specialty schedulers:   [phone] to schedule your surgery.  Main Clinic:   [phone] to make an appointment with any provider.    Urgent Care locations:    [facility] Monday-Friday Closed  Saturday-Sunday 9am-5pm      Monday-Friday 12pm - 8pm  Saturday-Sunday 9am-5pm [phone]     If SURGERY has been recommended, please call our Specialty Schedulers at [phone] to schedule your procedure.    If you need a medication refill, please contact your pharmacy. Please allow 3 business days for your refill to be completed.    If an MRI or CT scan has been recommended, please call [facility] Schedulers at [phone] to schedule your appointment.  Use GrouPAYt (secure e-mail communication and access to your chart) to send a message or to make an appointment. Please ask how you can sign up for Birdpost.  Your care team's suggested websites for health information:   Www.Cupid-Labs.org : Up to date and easily searchable information on multiple topics.   [email or web address] : [place] dept of heat, public health issues in [place], N1N1            Follow-ups after your visit        Follow-up notes from your care team     Return if symptoms worsen or fail to improve.      Your next 10 appointments already scheduled     Apr 21,  2017  9:00 AM CDT   Treatment with Kerijovanny Ramsey, OT   [facility] ([facility])    [address]  [address]  [facility] [address]   [phone]            Apr 24, 2017  7:30 AM CDT   Treatment with Kerijovanny Webberbeck, OT   [facility] ([facility])    [address]  [address]  [facility] [address]   [phone]            Apr 26, 2017 11:00 AM CDT   Return Visit with SELWYN Gaona CNP   [facility] ([facility])    [address]  [address]  [facility] [address] [phone]              Who to contact     If you have questions or need follow up information about today's clinic visit or your schedule please contact [facility] directly at [phone].  Normal or non-critical lab and imaging results will be communicated to you by Lightning Labhart, letter or phone within 4 business days after the clinic has received the results. If you do not hear from us within 7 days, please contact the clinic through Flux Factoryt or phone. If you have a critical or abnormal lab result, we will notify you by phone as soon as possible.  Submit refill requests through Hail Varsity or call your pharmacy and they will forward the refill request to us. Please allow 3 business days for your refill to be completed.          Additional Information About Your Visit        Lightning Labhart Information     Hail Varsity gives you secure access to your electronic health record. If you see a primary care provider, you can also send messages to your care team and make appointments. If you have questions, please call your primary care clinic.  If you do not have a primary care provider, please call [phone] and they will assist you.        Care EveryWhere ID     This is your Care EveryWhere ID. This could be used by other organizations to access your [facility] medical records  [id number]        Your Vitals Were      "Respirations Height BMI (Body Mass Index)             16 5' 9\" (1.753 m) 25.61 kg/m2          Blood Pressure from Last 3 Encounters:   04/03/17 123/80   03/02/17 128/83   02/28/17 123/73    Weight from Last 3 Encounters:   04/19/17 173 lb 6.4 oz (78.7 kg)   02/28/17 171 lb 6.4 oz (77.7 kg)   01/26/17 168 lb 6.4 oz (76.4 kg)              Today, you had the following     No orders found for display       Primary Care Provider Office Phone # Fax #    Samantha Dhaliwal, SELWYN [facility] [phone]       [address] [address]        Thank you!     Thank you for choosing [facility]  for your care. Our goal is always to provide you with excellent care. Hearing back from our patients is one way we can continue to improve our services. Please take a few minutes to complete the written survey that you may receive in the mail after your visit with us. Thank you!             Your Updated Medication List - Protect others around you: Learn how to safely use, store and throw away your medicines at www.disposemymeds.org.          This list is accurate as of: 4/19/17  1:46 PM.  Always use your most recent med list.                   Brand Name Dispense Instructions for use    diclofenac 1 % Gel topical gel    VOLTAREN    100 g    Apply 2-4 grams to affected area four times daily       DULoxetine 20 MG EC capsule    CYMBALTA    60 capsule    Take 1 capsule (20 mg) by mouth 2 times daily       gabapentin 300 MG capsule    NEURONTIN    180 capsule    600 mg TID       HYDROcodone-acetaminophen 5-325 MG per tablet    NORCO    20 tablet    1/2 to 1 tablet daily as needed for moderate to severe pain. #20 tabs to last 30 days.       levothyroxine 50 MCG tablet    SYNTHROID/LEVOTHROID    30 tablet    Take 50 mcg on even days, and 25 mcg (1/2 tab) on odd days.       lidocaine 5 % ointment    XYLOCAINE    100 g    Apply topically 4 times daily as needed for moderate pain       methocarbamol " 500 MG tablet    ROBAXIN    120 tablet    Take 1-2 tablets (500-1,000 mg) by mouth 4 times daily as needed for muscle spasms       simvastatin 20 MG tablet    ZOCOR    30 tablet    Take 1 tablet (20 mg) by mouth At Bedtime       VITAMIN C PO      Take 500 mg by mouth daily

## 2017-04-19 NOTE — LETTER
4/19/2017      RE: Diana Buenrostro  [address] [facility] [address]       Chief Complaint:   Chief Complaint   Patient presents with     RECHECK     WORK COMP. Left wrist mass. DOI 12/8/15. Patient went to PT this am and the therapist recommended her to get it checked out again because the volar mass has gotten bigger. She has had more pain in thumb and has some numbness going up the thumb. She has had more pain in the last month. Numbness started about a month ago.         HPI: Diana Buenrostro is a 61 year old female, right -hand dominant, who presents for follow up evaluation and management of a left wrist pain, volar ganglion cyst, no known injury. Pain has been present since 11/2016. She relates it to having to primarily use her left upper extremity, given right upper extremity fracture and development of CRPS,  over a year a go, under the prior injury of the right arm. Today, patient reports that she has been going to physical therapy. Continues to have wrist mass over the volar aspect of the wrist. She notes the mass has increased in size. She has had more pain in the thumb and also reports some numbness into the thumb. Pain has increased over the last month. Numbness and tingling started about a month ago as well.     Recall: History right upper extremity distal radius fracture, open-reduction, internal fixation with [facility], development of CRPS. Being managed by pain clinic.    Symptoms: mild-moderate pain, swelling.  Location of symptoms: left wrist, base of thumb.  Pain severity: 5/10  Pain quality: aching  Frequency of symptoms: frequently  Aggravating factors: with movement, palpation.  Relieving factors: at rest.    Previous treatment: none    Past medical history:  has a past medical history of Anxiety state, unspecified; Chronic kidney disease (CKD) (11/28/2008); Depressive disorder, not elsewhere classified; Insomnia, unspecified; Other specified anemias; PONV (postoperative  nausea and vomiting); Unspecified allergic alveolitis and pneumonitis; Unspecified hearing loss; Unspecified sleep apnea; and Urinary tract infection, site not specified. She also has no past medical history of Basal cell carcinoma; Malignant melanoma (H); or Squamous cell carcinoma (H).   Patient Active Problem List    Diagnosis Date Noted     Major depressive disorder, single episode, mild (H) 03/06/2017     Priority: Medium     YELENA (generalized anxiety disorder) 03/06/2017     Priority: Medium     Complex regional pain syndrome type 2 of right upper extremity 08/29/2016     Priority: Medium     Closed fracture of distal end of right radius 04/27/2016     Priority: Medium     Transient visual loss 08/26/2014     Class: Acute     Kaleidoscope alterations in the field of vision of both eyes.  8/26x3, 8/30, 8/31, 9/12, 9/15, 9/16, 9/21 to date.       Calculus of gallbladder with other cholecystitis, without mention of obstruction 04/03/2014     Advanced directives, counseling/discussion 11/07/2012     Discussed advance care planning with patient; however, patient declined at this time. 11/7/2012        Hypothyroidism 03/17/2011     Sarcoidosis (H) 11/12/2010     HYPERLIPIDEMIA LDL GOAL <100 10/31/2010     Pulmonary nodule 09/28/2010     Chronic kidney disease (CKD) 11/28/2008      3/2/2006 cr 1.11  6/13/06 cr 0.94  6/15/06 cr 1.47  8/2006 cr 1.30    IMO update changed this record. 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     Medications:    Current Outpatient Prescriptions   Medication Sig Dispense Refill     gabapentin (NEURONTIN) 300 MG capsule 600 mg  capsule 1      HYDROcodone-acetaminophen (NORCO) 5-325 MG per tablet 1/2 to 1 tablet daily as needed for moderate to severe pain. #20 tabs to last 30 days. 20 tablet 0     levothyroxine (SYNTHROID/LEVOTHROID) 50 MCG tablet Take 50 mcg on even days, and 25 mcg (1/2 tab) on odd days. 30 tablet 11     simvastatin (ZOCOR) 20 MG tablet Take 1 tablet (20 mg) by mouth At Bedtime 30 tablet 11     DULoxetine (CYMBALTA) 20 MG EC capsule Take 1 capsule (20 mg) by mouth 2 times daily 60 capsule 1     methocarbamol (ROBAXIN) 500 MG tablet Take 1-2 tablets (500-1,000 mg) by mouth 4 times daily as needed for muscle spasms 120 tablet 1     diclofenac (VOLTAREN) 1 % GEL Apply 2-4 grams to affected area four times daily 100 g 1     lidocaine (XYLOCAINE) 5 % ointment Apply topically 4 times daily as needed for moderate pain 100 g 1     Ascorbic Acid (VITAMIN C PO) Take 500 mg by mouth daily          Allergies:     Allergies   Allergen Reactions     Codeine GI Disturbance     Serzone [Nefazodone Hydrochloride] Nausea and Vomiting     After 1 dose        Family History: family history includes Arthritis in her mother; CANCER in her father; CANCER (age of onset: 40) in her paternal grandmother; DIABETES in her father, mother, and sister; Genitourinary Problems in her sister; HEART DISEASE in her mother and sister; Hypertension in her mother; Lipids in her mother and sister. There is no history of Family History Negative.     Social History:  reports that she quit smoking about 22 years ago. She quit after 15.00 years of use. She has never used smokeless tobacco. She reports that she does not drink alcohol or use illicit drugs.     This document serves as a record of the services and decisions personally performed and made by Benny Curtis MD. It was created on his behalf by Zenaida Olguin, a trained medical scribe. The creation of this document is based the provider's statements to the medical scribe.    Angie Olguin 11:18 AM 4/19/2017     Review  "of Systems:     Denies numbness, tingling, parasthesias.   Denies headaches.   Denies fevers, chills, night sweats   Denies chest pain.   Denies shortness of breath.   Denies any skin problems, abrasions, rashes, irritation.    Physical Exam  GENERAL APPEARANCE: healthy, alert, no distress.  SKIN: no suspicious lesions or rashes  NEURO: mentation intact and speech normal  PSYCH:  mentation appears normal and affect normal. Not anxious.  RESPIRATORY: No increased work of breathing.    Resp 16  Ht 1.753 m (5' 9\")  Wt 78.7 kg (173 lb 6.4 oz)  BMI 25.61 kg/m2     left HAND / WRIST EXAM:    Skin intact. No abnormal skin discoloration, erythema or ecchymosis.   Degenerative changes of the interphalangeal joints.   Normal color and tone.  Palpable lobulated mass, 1.5 x 2.5 cm, located over volar-radial wrist, adjacent to radial artery  Mass does transiluminate with light.  Mass tender to palpation. Fluctuant.  Negative Tinel's over mass.  There is mild swelling in the volar-radial wrist.  There is moderate tenderness over the distal radius. Mild tenderness over the carpometacarpal joint of the thumb.   There is no ecchymosis.  There is no erythema of the surrounding skin.  There is no maceration of the skin.  There is no deformity in the area.  Intact extensors. No extensor lag.    Special tests: Finkelsteins: equivocal.    1st carpometacarpal crank and grind: crank: positive, grind: positive    Intact sensation light touch median, radial, ulnar nerves of the hand  Intact sensation to the radial and ulnar digital nerves of the fingers, as well as the finger tips.  Intact epl fpl fdp edc wrist flexion/extension biceps/triceps deltoid  Brisk capillary refill to all fingers.   Palpable radial pulse, 2+.  Jose's test: good collateral ulnar flow    X-rays: no new x-rays.  3 views left wrist from 1/26/2017 were reviewed in clinic today. On my review, no obvious fractures or other bony abnormalities. Mild thumb " carpometacarpal degenerative changes.    Assessment: 61 year old female with left wrist pain, volar ganglion cyst.    Plan:  * not uncommon to have fluctuation in cyst size over time, activities. May go up and down.  * pain likely related to the mass volar-radial wrist consistent with a ganglion cyst.  * I do not suspect CRPS at this time.  * may also have some underlying mild dequervains, carpometacarpal arthritis, but appears most related to the ganglion.    Discussed with patient that the mass is consistent with ganglion cyst, a common, benign tumor of the upper extremity. Less likely another type of tumor or neoplasm. Treatment options include: observation if asymptomatic or minimal symptoms, activity modification, splint, aspiration with cortisone injection (risks of recurrence > 50%), or surgical excision (risk of recurrence < 5-10%). Risks and benefits of each discussed in detail.    * in particular, risks of surgery include, but not limited to: bleeding, infection, pain, scar, damage to adjacent structures (nerves, vessels, bone, cartilage, tendons, ligaments), temporary versus permanent nerve injury, failure to relieve symptoms, recurrence of symptoms or recurrence of the mass, stiffness, need for further surgery, risks of anesthesia, blood clots, death.  * in her case, would be at risk of developing CRPS ,given prior and current CRPS right wrist.    * at this time, she'd rather not pursue any treatment other than a wrist brace.    * could consider referral for an U/S guided cyst aspiration/injection in future as needed.  * rest, ice, nsaids, activity modification, wrist brace ( to get from her therapist)    * return to clinic as needed.    The information in this document, created by a scribe for me, accurately reflects the services I personally performed and the decisions made by me. I have reviewed and approved this document for accuracy.      Benny Curtis M.D., M.S.  Dept. of Orthopaedic  Surgery  [facility]          Benny Curtis MD

## 2017-04-19 NOTE — PROGRESS NOTES
[facility]   [facility] [place]  Behavioral Medicine Visit    Patient Name: Diana Buenrostro    YOB: 1955   Medical Record Number: [id number]  Date: 4/19/2017                SUBJECTIVE:  Session objective: Overall feeling more accepting, steady, able to respond to variations in her pain in a more helpful way. Reports she knows she has a large void related to her work passion but feels as though she will be able to redirect some of that related to her work and already uses those same views in the rest of her life. She reports she will continue with her antidepressant while consulting with prescriber on how to best manage those questions, We have decided to discontinue regularly scheduled appointments with the understanding that while she is in the pain program she can reschedule    OBJECTIVE:   Length of Visit: 30 minutes      Assessment: Current Emotional / Mental Status    Appearance:   Appropriate   Eye Contact:   Good   Psychomotor Behavior: Normal   Attitude:   Cooperative   Orientation:   All  Speech   Rate / Production: Normal    Volume:  Normal   Mood:    Normal  Affect:    Appropriate   Thought Content:  Clear   Thought Form:  Coherent  Logical   Insight:    Good     ASSESSMENT:   Axis I: CRPS Upper right extremity  Axis II: Dx deferred    Progress toward goals: good.      Pain Status: improved    Emotional Status: improved   Medication / chemical use concerns: None    PLAN:   Next Appointment: Diana Buenrostro will not reschedule at this time,  Assignment: see above   Objectives / interventions for next session: see above    Pj Juarez MA LP, [facility] [place]  Pain Specialist  [facility]

## 2017-04-19 NOTE — MR AVS SNAPSHOT
After Visit Summary   4/19/2017    Diana Buenrostro    MRN: [id number]           Patient Information     Date Of Birth          1955        Visit Information        Provider Department      4/19/2017 10:00 AM Pj Juarez LP [facility]        Today's Diagnoses     Complex regional pain syndrome type 2 of right upper extremity    -  1       Follow-ups after your visit        Your next 10 appointments already scheduled     Apr 21, 2017  9:00 AM CDT   Treatment with Keri Thurnbeck, OT   [facility] ([facility])    [address]  [address]  [facility] [place] [address]   [phone]            Apr 24, 2017  7:30 AM CDT   Treatment with Keri Thurnbeck, OT   [facility] ([facility])    [address]  [address]  [facility] [place] [address]   [phone]            Apr 26, 2017 11:00 AM CDT   Return Visit with SELWYN Gaona CNP   [facility] ([facility])    [address]  [address]  [place] [address] [phone]              Who to contact     If you have questions or need follow up information about today's clinic visit or your schedule please contact [facility] directly at [phone].  Normal or non-critical lab and imaging results will be communicated to you by MyChart, letter or phone within 4 business days after the clinic has received the results. If you do not hear from us within 7 days, please contact the clinic through MyChart or phone. If you have a critical or abnormal lab result, we will notify you by phone as soon as possible.  Submit refill requests through [employer] or call your pharmacy and they will forward the refill request to us. Please allow 3 business days for your refill to be completed.          Additional Information About Your Visit        [employer] Information     [employer] gives you secure access to your electronic  health record. If you see a primary care provider, you can also send messages to your care team and make appointments. If you have questions, please call your primary care clinic.  If you do not have a primary care provider, please call [phone] and they will assist you.        Care EveryWhere ID     This is your Care EveryWhere ID. This could be used by other organizations to access your [place] medical records  [id number]         Blood Pressure from Last 3 Encounters:   04/03/17 123/80   03/02/17 128/83   02/28/17 123/73    Weight from Last 3 Encounters:   04/19/17 78.7 kg (173 lb 6.4 oz)   02/28/17 77.7 kg (171 lb 6.4 oz)   01/26/17 76.4 kg (168 lb 6.4 oz)              We Performed the Following     HEALTH & BEHAVIOR ASSESS, INITIAL, JASON 15MIN PHD        Primary Care Provider Office Phone # Fax #    Samantha Olsen SELWYN Dhaliwal [facility] [phone]       [facility] [address] [address]        Thank you!     Thank you for choosing [facility]  for your care. Our goal is always to provide you with excellent care. Hearing back from our patients is one way we can continue to improve our services. Please take a few minutes to complete the written survey that you may receive in the mail after your visit with us. Thank you!             Your Updated Medication List - Protect others around you: Learn how to safely use, store and throw away your medicines at www.disposemymeds.org.          This list is accurate as of: 4/19/17 11:15 AM.  Always use your most recent med list.                   Brand Name Dispense Instructions for use    diclofenac 1 % Gel topical gel    VOLTAREN    100 g    Apply 2-4 grams to affected area four times daily       DULoxetine 20 MG EC capsule    CYMBALTA    60 capsule    Take 1 capsule (20 mg) by mouth 2 times daily       gabapentin 300 MG capsule    NEURONTIN    180 capsule    600 mg TID       HYDROcodone-acetaminophen 5-325 MG per tablet    NORCO     20 tablet    1/2 to 1 tablet daily as needed for moderate to severe pain. #20 tabs to last 30 days.       levothyroxine 50 MCG tablet    SYNTHROID/LEVOTHROID    30 tablet    Take 50 mcg on even days, and 25 mcg (1/2 tab) on odd days.       lidocaine 5 % ointment    XYLOCAINE    100 g    Apply topically 4 times daily as needed for moderate pain       methocarbamol 500 MG tablet    ROBAXIN    120 tablet    Take 1-2 tablets (500-1,000 mg) by mouth 4 times daily as needed for muscle spasms       simvastatin 20 MG tablet    ZOCOR    30 tablet    Take 1 tablet (20 mg) by mouth At Bedtime       VITAMIN C PO      Take 500 mg by mouth daily

## 2017-04-19 NOTE — Clinical Note
4/19/2017       RE: Diana Buenrostro  [address] [address]           Dear Colleague,    Thank you for referring your patient, Diana Buenrostro, to the [facility]. Please see a copy of my visit note below.    Chief Complaint:   Chief Complaint   Patient presents with     RECHECK     WORK COMP. Left wrist mass. DOI 12/8/15. Patient went to PT this am and the therapist recommended her to get it checked out again because the volar mass has gotten bigger. She has had more pain in thumb and has some numbness going up the thumb. She has had more pain in the last month. Numbness started about a month ago.         HPI: Diana Buenrostro is a 61 year old female, right -hand dominant, who presents for follow up evaluation and management of a left wrist pain, no known injury. Pain has been present since 11/2016. She relates it to having to primarily use her left upper extremity, given right upper extremity fracture and development of CRPS,  over a year a go, under the prior injury of the right arm. Today, patient reports that she has been going to physical therapy. Continues to have wrist mass over the volar aspect of the wrist. She notes the mass has increased in size. She has had more pain in the thumb and also reports some numbness into the thumb. Pain has increased over the last month. Numbness and tingling started about a month ago as well.     Recall: Left wrist pain is located in the left wrist, base of thumb, and into the arm. It is aggravated with lifting, rated a 5/10. She has had weakness now associated with the pain. She has been going to physical therapy for bilateral wrists. Her physical therapist states that the left arm is getting weaker. She has just received a pair of physical therapy gloves. It does provide relief for the left arm when driving, however not the right. She is still wearing a brace for the right wrist. Swollen mass over the volar aspect of the wrist  since 12/8/2015. History right upper extremity distal radius fracture, open-reduction, internal fixation, development of CRPS.    Symptoms: moderate pain, swelling.  Location of symptoms: left wrist, base of thumb.  Pain severity: 5/10  Pain quality: aching  Frequency of symptoms: frequently  Aggravating factors: with movement, palpation.  Relieving factors: at rest.    Previous treatment: none    Past medical history:  has a past medical history of Anxiety state, unspecified; Chronic kidney disease (CKD) (11/28/2008); Depressive disorder, not elsewhere classified; Insomnia, unspecified; Other specified anemias; PONV (postoperative nausea and vomiting); Unspecified allergic alveolitis and pneumonitis; Unspecified hearing loss; Unspecified sleep apnea; and Urinary tract infection, site not specified. She also has no past medical history of Basal cell carcinoma; Malignant melanoma (H); or Squamous cell carcinoma (H).   Patient Active Problem List    Diagnosis Date Noted     Major depressive disorder, single episode, mild (H) 03/06/2017     Priority: Medium     YELENA (generalized anxiety disorder) 03/06/2017     Priority: Medium     Complex regional pain syndrome type 2 of right upper extremity 08/29/2016     Priority: Medium     Closed fracture of distal end of right radius 04/27/2016     Priority: Medium     Transient visual loss 08/26/2014     Class: Acute     Kaleidoscope alterations in the field of vision of both eyes.  8/26x3, 8/30, 8/31, 9/12, 9/15, 9/16, 9/21 to date.       Calculus of gallbladder with other cholecystitis, without mention of obstruction 04/03/2014     Advanced directives, counseling/discussion 11/07/2012     Discussed advance care planning with patient; however, patient declined at this time. 11/7/2012        Hypothyroidism 03/17/2011     Sarcoidosis (H) 11/12/2010     HYPERLIPIDEMIA LDL GOAL <100 10/31/2010     Pulmonary nodule 09/28/2010     Chronic kidney disease (CKD) 11/28/2008      3/2/2006  cr 1.11  6/13/06 cr 0.94  6/15/06 cr 1.47  8/2006 cr 1.30    IMO update changed this record. 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     Medications:    Current Outpatient Prescriptions   Medication Sig Dispense Refill     gabapentin (NEURONTIN) 300 MG capsule 600 mg  capsule 1     HYDROcodone-acetaminophen (NORCO) 5-325 MG per tablet 1/2 to 1 tablet daily as needed for moderate to severe pain. #20 tabs to last 30 days. 20 tablet 0     levothyroxine (SYNTHROID/LEVOTHROID) 50 MCG tablet Take 50 mcg on even days, and 25 mcg (1/2 tab) on odd days. 30 tablet 11     simvastatin (ZOCOR) 20 MG tablet Take 1 tablet (20 mg) by mouth At Bedtime 30 tablet 11     DULoxetine (CYMBALTA) 20 MG EC capsule Take 1 capsule (20 mg) by mouth 2 times daily 60 capsule 1     methocarbamol (ROBAXIN) 500 MG tablet Take 1-2 tablets (500-1,000 mg) by mouth 4 times daily as needed for muscle spasms 120 tablet 1     diclofenac (VOLTAREN) 1 % GEL Apply 2-4 grams to affected area four times daily 100 g 1     lidocaine (XYLOCAINE) 5 % ointment Apply topically 4 times daily as needed for moderate pain 100 g 1     Ascorbic Acid (VITAMIN C PO) Take 500 mg by mouth daily          Allergies:     Allergies   Allergen Reactions     Codeine GI Disturbance     Serzone [Nefazodone Hydrochloride] Nausea and Vomiting     After 1 dose        Family History: family history includes Arthritis in her mother; CANCER in her father; CANCER (age of onset: 40) in her paternal grandmother; DIABETES in her father, mother, and sister; Genitourinary Problems in her sister; HEART DISEASE in her mother and sister;  "Hypertension in her mother; Lipids in her mother and sister. There is no history of Family History Negative.     Social History:  reports that she quit smoking about 22 years ago. She quit after 15.00 years of use. She has never used smokeless tobacco. She reports that she does not drink alcohol or use illicit drugs.     This document serves as a record of the services and decisions personally performed and made by Benny Curtis MD. It was created on his behalf by Zenaida Olguin, a trained medical scribe. The creation of this document is based the provider's statements to the medical scribe.    Scribe Zenaida Olguin 11:18 AM 4/19/2017     Review of Systems:  ROS: 10 point ROS neg other than the symptoms noted above in the HPI and past medical history.    Physical Exam  GENERAL APPEARANCE: healthy, alert, no distress.  SKIN: no suspicious lesions or rashes  NEURO: mentation intact and speech normal  PSYCH:  mentation appears normal and affect normal. Not anxious.  RESPIRATORY: No increased work of breathing.    Resp 16  Ht 1.753 m (5' 9\")  Wt 78.7 kg (173 lb 6.4 oz)  BMI 25.61 kg/m2     left HAND / WRIST EXAM:    Skin intact. No abnormal skin discoloration, erythema or ecchymosis.   Degenerative changes of the interphalangeal joints.   Normal color and tone.  Palpable lobulated mass, 1.5 x 2.5 cm, located over volar-radial wrist, adjacent to radial artery  Mass does transiluminate with light.  Mass tender to palpation. Fluctuant.  Negative Tinel's over mass.  There is mild swelling in the volar-radial wrist.  There is moderate tenderness over the distal radius. Mild tenderness over the carpometacarpal joint of the thumb.   There is no ecchymosis.  There is no erythema of the surrounding skin.  There is no maceration of the skin.  There is no deformity in the area.  Intact extensors. No extensor lag.    Special tests: Finkelsteins: equivocal.    1st carpometacarpal crank and grind: crank: positive, grind: " positive    Intact sensation light touch median, radial, ulnar nerves of the hand  Intact sensation to the radial and ulnar digital nerves of the fingers, as well as the finger tips.  Intact epl fpl fdp edc wrist flexion/extension biceps/triceps deltoid  Brisk capillary refill to all fingers.   Palpable radial pulse, 2+.  Jose's test: good collateral ulnar flow    X-rays: no new x-rays.  3 views left wrist from 1/26/2017 were reviewed in clinic today. On my review, no obvious fractures or other bony abnormalities. Mild thumb carpometacarpal degenerative changes.    Assessment: 61 year old female with left wrist pain, volar ganglion cyst.    Plan:  * Reviewed imaging with patient. Also, clinical exam findings.   * pain likely related to the mass volar-radial wrist consistent with a ganglion cyst.  * I do not suspect CRPS at this time.  * may also have some underlying mild dequervains, carpometacarpal arthritis, but appears most related to the ganglion.    Discussed with patient that the mass is consistent with ganglion cyst, a common, benign tumor of the upper extremity. Less likely another type of tumor or neoplasm. Treatment options include: observation if asymptomatic or minimal symptoms, activity modification, splint, aspiration with cortisone injection (risks of recurrence > 50%), or surgical excision (risk of recurrence < 5-10%). Risks and benefits of each discussed in detail.    * in particular, risks of surgery include, but not limited to: bleeding, infection, pain, scar, damage to adjacent structures (nerves, vessels, bone, cartilage, tendons, ligaments), temporary versus permanent nerve injury, failure to relieve symptoms, recurrence of symptoms or recurrence of the mass, stiffness, need for further surgery, risks of anesthesia, blood clots, death.  * in her case, would be at risk of developing CRPS ,given prior and current CRPS right wrist.    * at this time, she'd rather not pursue any treatment other  than a wrist brace.d  * I think a wrist brace, like the one she has for her right wrist from her therapist, would be a good choice at this time  * could consider referral for an U/S guided cyst aspiration/injection in future as needed.  * rest, ice, nsaids, activity modification, wrist brace ( to get from her therapist)    * return to clinic as needed.    The information in this document, created by a scribe for me, accurately reflects the services I personally performed and the decisions made by me. I have reviewed and approved this document for accuracy.      Benny Curtis M.D., M.S.  Dept. of Orthopaedic Surgery  [facility]    Again, thank you for allowing me to participate in the care of your patient.        Sincerely,              Benny Curtis MD

## 2017-04-19 NOTE — NURSING NOTE
"Chief Complaint   Patient presents with     RECHECK     WORK COMP. Left wrist mass. DOI 12/8/15. Patient went to PT this am and the therapist recommended her to get it checked out again because the volar mass has gotten bigger. She has had more pain in thumb and has some numbness going up the thumb. She has had more pain in the last month. Numbness started about a month ago.        Initial Resp 16  Ht 1.753 m (5' 9\")  Wt 78.7 kg (173 lb 6.4 oz)  BMI 25.61 kg/m2 Estimated body mass index is 25.61 kg/(m^2) as calculated from the following:    Height as of this encounter: 1.753 m (5' 9\").    Weight as of this encounter: 78.7 kg (173 lb 6.4 oz).  Medication Reconciliation: arianna Siddiqui Certified Medical Assistant    "

## 2017-04-19 NOTE — PROGRESS NOTES
Chief Complaint:   Chief Complaint   Patient presents with     RECHECK     WORK COMP. Left wrist mass. DOI 12/8/15. Patient went to PT this am and the therapist recommended her to get it checked out again because the volar mass has gotten bigger. She has had more pain in thumb and has some numbness going up the thumb. She has had more pain in the last month. Numbness started about a month ago.         HPI: Diana Buenrostro is a 61 year old female, right -hand dominant, who presents for follow up evaluation and management of a left wrist pain, volar ganglion cyst, no known injury. Pain has been present since 11/2016. She relates it to having to primarily use her left upper extremity, given right upper extremity fracture and development of CRPS,  over a year a go, under the prior injury of the right arm. Today, patient reports that she has been going to physical therapy. Continues to have wrist mass over the volar aspect of the wrist. She notes the mass has increased in size. She has had more pain in the thumb and also reports some numbness into the thumb. Pain has increased over the last month. Numbness and tingling started about a month ago as well.     Recall: History right upper extremity distal radius fracture, open-reduction, internal fixation with [facility], development of CRPS. Being managed by pain clinic.    Symptoms: mild-moderate pain, swelling.  Location of symptoms: left wrist, base of thumb.  Pain severity: 5/10  Pain quality: aching  Frequency of symptoms: frequently  Aggravating factors: with movement, palpation.  Relieving factors: at rest.    Previous treatment: none    Past medical history:  has a past medical history of Anxiety state, unspecified; Chronic kidney disease (CKD) (11/28/2008); Depressive disorder, not elsewhere classified; Insomnia, unspecified; Other specified anemias; PONV (postoperative nausea and vomiting); Unspecified allergic alveolitis and pneumonitis; Unspecified hearing  loss; Unspecified sleep apnea; and Urinary tract infection, site not specified. She also has no past medical history of Basal cell carcinoma; Malignant melanoma (H); or Squamous cell carcinoma (H).   Patient Active Problem List    Diagnosis Date Noted     Major depressive disorder, single episode, mild (H) 03/06/2017     Priority: Medium     YELENA (generalized anxiety disorder) 03/06/2017     Priority: Medium     Complex regional pain syndrome type 2 of right upper extremity 08/29/2016     Priority: Medium     Closed fracture of distal end of right radius 04/27/2016     Priority: Medium     Transient visual loss 08/26/2014     Class: Acute     Kaleidoscope alterations in the field of vision of both eyes.  8/26x3, 8/30, 8/31, 9/12, 9/15, 9/16, 9/21 to date.       Calculus of gallbladder with other cholecystitis, without mention of obstruction 04/03/2014     Advanced directives, counseling/discussion 11/07/2012     Discussed advance care planning with patient; however, patient declined at this time. 11/7/2012        Hypothyroidism 03/17/2011     Sarcoidosis (H) 11/12/2010     HYPERLIPIDEMIA LDL GOAL <100 10/31/2010     Pulmonary nodule 09/28/2010     Chronic kidney disease (CKD) 11/28/2008      3/2/2006 cr 1.11  6/13/06 cr 0.94  6/15/06 cr 1.47  8/2006 cr 1.30    IMO update changed this record. 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     Medications:    Current Outpatient Prescriptions   Medication Sig Dispense Refill     gabapentin (NEURONTIN) 300 MG capsule 600 mg  capsule 1     HYDROcodone-acetaminophen (NORCO) 5-325 MG per tablet 1/2 to 1 tablet daily as needed for  moderate to severe pain. #20 tabs to last 30 days. 20 tablet 0     levothyroxine (SYNTHROID/LEVOTHROID) 50 MCG tablet Take 50 mcg on even days, and 25 mcg (1/2 tab) on odd days. 30 tablet 11     simvastatin (ZOCOR) 20 MG tablet Take 1 tablet (20 mg) by mouth At Bedtime 30 tablet 11     DULoxetine (CYMBALTA) 20 MG EC capsule Take 1 capsule (20 mg) by mouth 2 times daily 60 capsule 1     methocarbamol (ROBAXIN) 500 MG tablet Take 1-2 tablets (500-1,000 mg) by mouth 4 times daily as needed for muscle spasms 120 tablet 1     diclofenac (VOLTAREN) 1 % GEL Apply 2-4 grams to affected area four times daily 100 g 1     lidocaine (XYLOCAINE) 5 % ointment Apply topically 4 times daily as needed for moderate pain 100 g 1     Ascorbic Acid (VITAMIN C PO) Take 500 mg by mouth daily          Allergies:     Allergies   Allergen Reactions     Codeine GI Disturbance     Serzone [Nefazodone Hydrochloride] Nausea and Vomiting     After 1 dose        Family History: family history includes Arthritis in her mother; CANCER in her father; CANCER (age of onset: 40) in her paternal grandmother; DIABETES in her father, mother, and sister; Genitourinary Problems in her sister; HEART DISEASE in her mother and sister; Hypertension in her mother; Lipids in her mother and sister. There is no history of Family History Negative.     Social History:  reports that she quit smoking about 22 years ago. She quit after 15.00 years of use. She has never used smokeless tobacco. She reports that she does not drink alcohol or use illicit drugs.     This document serves as a record of the services and decisions personally performed and made by Benny Curtis MD. It was created on his behalf by Zenaida Olguin, a trained medical scribe. The creation of this document is based the provider's statements to the medical scribe.    Bhargavibkarmen Olguin 11:18 AM 4/19/2017     Review of Systems:     Denies numbness, tingling, parasthesias.   Denies headaches.   Denies fevers,  "chills, night sweats   Denies chest pain.   Denies shortness of breath.   Denies any skin problems, abrasions, rashes, irritation.    Physical Exam  GENERAL APPEARANCE: healthy, alert, no distress.  SKIN: no suspicious lesions or rashes  NEURO: mentation intact and speech normal  PSYCH:  mentation appears normal and affect normal. Not anxious.  RESPIRATORY: No increased work of breathing.    Resp 16  Ht 1.753 m (5' 9\")  Wt 78.7 kg (173 lb 6.4 oz)  BMI 25.61 kg/m2     left HAND / WRIST EXAM:    Skin intact. No abnormal skin discoloration, erythema or ecchymosis.   Degenerative changes of the interphalangeal joints.   Normal color and tone.  Palpable lobulated mass, 1.5 x 2.5 cm, located over volar-radial wrist, adjacent to radial artery  Mass does transiluminate with light.  Mass tender to palpation. Fluctuant.  Negative Tinel's over mass.  There is mild swelling in the volar-radial wrist.  There is moderate tenderness over the distal radius. Mild tenderness over the carpometacarpal joint of the thumb.   There is no ecchymosis.  There is no erythema of the surrounding skin.  There is no maceration of the skin.  There is no deformity in the area.  Intact extensors. No extensor lag.    Special tests: Finkelsteins: equivocal.    1st carpometacarpal crank and grind: crank: positive, grind: positive    Intact sensation light touch median, radial, ulnar nerves of the hand  Intact sensation to the radial and ulnar digital nerves of the fingers, as well as the finger tips.  Intact epl fpl fdp edc wrist flexion/extension biceps/triceps deltoid  Brisk capillary refill to all fingers.   Palpable radial pulse, 2+.  Jose's test: good collateral ulnar flow    X-rays: no new x-rays.  3 views left wrist from 1/26/2017 were reviewed in clinic today. On my review, no obvious fractures or other bony abnormalities. Mild thumb carpometacarpal degenerative changes.    Assessment: 61 year old female with left wrist pain, volar ganglion " cyst.    Plan:  * not uncommon to have fluctuation in cyst size over time, activities. May go up and down.  * pain likely related to the mass volar-radial wrist consistent with a ganglion cyst.  * I do not suspect CRPS at this time.  * may also have some underlying mild dequervains, carpometacarpal arthritis, but appears most related to the ganglion.    Discussed with patient that the mass is consistent with ganglion cyst, a common, benign tumor of the upper extremity. Less likely another type of tumor or neoplasm. Treatment options include: observation if asymptomatic or minimal symptoms, activity modification, splint, aspiration with cortisone injection (risks of recurrence > 50%), or surgical excision (risk of recurrence < 5-10%). Risks and benefits of each discussed in detail.    * in particular, risks of surgery include, but not limited to: bleeding, infection, pain, scar, damage to adjacent structures (nerves, vessels, bone, cartilage, tendons, ligaments), temporary versus permanent nerve injury, failure to relieve symptoms, recurrence of symptoms or recurrence of the mass, stiffness, need for further surgery, risks of anesthesia, blood clots, death.  * in her case, would be at risk of developing CRPS ,given prior and current CRPS right wrist.    * at this time, she'd rather not pursue any treatment other than a wrist brace.    * could consider referral for an U/S guided cyst aspiration/injection in future as needed.  * rest, ice, nsaids, activity modification, wrist brace ( to get from her therapist)    * return to clinic as needed.    The information in this document, created by a scribe for me, accurately reflects the services I personally performed and the decisions made by me. I have reviewed and approved this document for accuracy.      Benny Curtis M.D., M.S.  Dept. of Orthopaedic Surgery  [facility]

## 2017-04-19 NOTE — PATIENT INSTRUCTIONS
Please remember to call and schedule a follow up appointment if one was recommended at your earliest convenience.  Orthopedics CLINIC HOURS TELEPHONE NUMBER   Dr. Ever Chester  Certified Medical Assistant   Monday & Wednesday   8am - 5pm  Thursday 1pm - 5pm  Friday 8am -11:30am Specialty schedulers:   [phone] to schedule your surgery.  Main Clinic:   [phone] to make an appointment with any provider.    Urgent Care locations:    [facility] Monday-Friday Closed  Saturday-Sunday 9am-5pm      Monday-Friday 12pm - 8pm  Saturday-Sunday 9am-5pm [phone]     If SURGERY has been recommended, please call our Specialty Schedulers at [phone] to schedule your procedure.    If you need a medication refill, please contact your pharmacy. Please allow 3 business days for your refill to be completed.    If an MRI or CT scan has been recommended, please call [facility] Schedulers at [phone] to schedule your appointment.  Use [facility] (secure e-mail communication and access to your chart) to send a message or to make an appointment. Please ask how you can sign up for [facility].  Your care team's suggested websites for health information:   [email or web address] : Up to date and easily searchable information on multiple topics.   [email or web address] : [place] dept of heat, public health issues in [place], N1N1

## 2017-04-24 ENCOUNTER — HOSPITAL ENCOUNTER (OUTPATIENT)
Dept: OCCUPATIONAL THERAPY | Facility: CLINIC | Age: 62
Setting detail: THERAPIES SERIES
End: 2017-04-24
Attending: NURSE PRACTITIONER
Payer: OTHER MISCELLANEOUS

## 2017-04-24 PROCEDURE — 40000839 ZZH STATISTIC HAND THERAPY VISIT: Performed by: OCCUPATIONAL THERAPIST

## 2017-04-24 PROCEDURE — 97110 THERAPEUTIC EXERCISES: CPT | Mod: GO | Performed by: OCCUPATIONAL THERAPIST

## 2017-04-24 PROCEDURE — 97022 WHIRLPOOL THERAPY: CPT | Mod: GO | Performed by: OCCUPATIONAL THERAPIST

## 2017-04-24 NOTE — PROGRESS NOTES
04/24/17 0500   Notes   Note Type Progress Note   Providers   Providers Keri Ramsey OTR/L, CHT   Referring Physician Mallory Mayo CNP   Reporting Period   Reporting period from 03/27/17   Reporting period to 04/24/17   General Information   Rxs Authorized 36   Rxs Used 35 ( 4 since last progress note)   Medical Diagnosis Right CRPS   Orders Evaluate And Treat As Indicated   Insurance WC   Start Of Care Date 10/26/16   Onset date of current episode/exacerbation 12/08/15   Surgical procedure ORIF   Date of surgery 12/10/15   Date for Next MD Appointment 04/26/17  (shot on 14th , MD on 15th)   Other pertinent information no future block scheduled at this time.   Subjective Measures   Subjective Patient relates she feels she has been about the same over the past month   Initial Pain level 10/10  (at worst and 3 at best)   Current Pain level 3/10   Patient Reported % Functional Improvement 40% in the last month   Functional Improvement Reported Self care activities  (hard to qualify as everyday is so different)   QuickDASH [Functional Disability Questionnaire; 0-100 (0=no dysfunction; 100=dysfunction)] (look at UEFI- Has continued to get better . See for details.)   Objective Measures   Objective Measures Strength;Objective Measure 1;Objective Measure 2;Objective Measure 3   Edema   Location (anatomical) DWC   Location Right;Left   Edema Comments 16.3,17   ROM   ROM Comments full ROM   Strength   Location Right;Left    22#, ( Was 10 last prgress not) left 29# was 20 last proress note   Maya Pinch 7## (was 6 # last progress note),12( Was10# last progress note)   Lateral Pinch 12# ( was 10#), left 14# (was 10#)   Objective Measure 1   Objective Measure hypersensitivity wrist   Details moderate- patient report this has improved quite a bit   Modalities   Modalities Fluidotherapy   Fluidotherapy -Duration 10 min  (5 min)   Skilled Interventions To Increase Tissue Extensibility;To Increase Tissue Excursion;To  Decrease Hypersensitivity   Airflow low   Temperature 120   Set Up with gentle ex   Therapeutic Exercise   Therapeutic Exercise Other Exercises/Activities   Skilled Interventions (verbal and physical cuing)   Minutes of Treatment 10   Other Exer/Activities/Educ   Exercise 1 pill pennies out of putty   Hand Goals   Hand Goals Household Chores;Sports/Recreation;Hygiene/Toileting;Sleeping   Hygiene/Toileting   Current Functional Task Brushing/combing hair  (washing hair)   Previous Performance Level Moderate limitations   Current Performance Level Moderate difficulty  (mild on  a good)   Goal Target Task (wash hair)   Goal Target Performance Level No difficulty   Due Date 05/09/17   If goal not met, Why? progressing overall better but has maintained over the last month. Has difficulty holding sprayer   Household Chores   Current Functional Task Carrying;Gripping   Previous Performance Level Severe limitations   Current Performance Level Moderate difficulty  (with carrying - moderate with opening jar)   Goal Target Task Carry a shopping bag;Open a tight or new jar   Goal Target Performance Level Mild difficulty   Due Date 05/09/17   If goal not met, Why? progressing- has improved some over last month but can still be difficutl   Sleeping   Current Functional Task Sleeping through the night   Previous Performance Level Moderate difficulty   Current Performance Level Moderate difficulty   Goal Target Task (to sleep 7-8 hours a night without wakeing)   Goal Target Performance Level Mild difficulty   Due Date 05/14/17   If goal not met, Why? This still presents as a problem but not waking up do to pain alone at night. Has been taking less pain medication.   Sports/Recreation   Current Functional Task Gripping   Previous Performance Level Severe limitations   Curent Performance Level Severe difficulty;Moderate difficulty  (depening on day can go from mod to severe.)   Goal Target Task (to hold onto treadmill)   Goal Target  Performance Level Mild difficulty   Due Date 04/07/17   If goal not met, Why? progressing   Assessment   Clinical Impression(s) Comments patient has done quite well on her own. Recommend D/C to home program i   Response to Therapy: Lack of Improvement Strength   Response to Therapy: Improvements Pain;Flexibility   Education   Learner Patient   Readiness Eager   Method Booklet/handout;Explanation;Demonstration   Response Verbalizes understanding;Demonstrates understanding   Plan   Home program stretching, weight bearing, desensitization, mirror therapy,  strengthening, functional use   Plan Recommendation sent to referral regarding D/C to home program. Will hold chart open for one month in case patient has a flare up and would need to come back in for a few visits.   Total Session Time   Keri Ramsey OTR/L CHT  Occupational Therapist, Certified Hand Therapist

## 2017-04-26 ENCOUNTER — TELEPHONE (OUTPATIENT)
Dept: PALLIATIVE MEDICINE | Facility: CLINIC | Age: 62
End: 2017-04-26

## 2017-04-26 ENCOUNTER — OFFICE VISIT (OUTPATIENT)
Dept: PALLIATIVE MEDICINE | Facility: CLINIC | Age: 62
End: 2017-04-26
Payer: OTHER MISCELLANEOUS

## 2017-04-26 VITALS
HEART RATE: 89 BPM | WEIGHT: 175 LBS | DIASTOLIC BLOOD PRESSURE: 83 MMHG | BODY MASS INDEX: 25.84 KG/M2 | SYSTOLIC BLOOD PRESSURE: 125 MMHG

## 2017-04-26 DIAGNOSIS — G57.71 COMPLEX REGIONAL PAIN SYNDROME TYPE 2 OF RIGHT LOWER EXTREMITY: ICD-10-CM

## 2017-04-26 PROCEDURE — 99214 OFFICE O/P EST MOD 30 MIN: CPT | Performed by: NURSE PRACTITIONER

## 2017-04-26 RX ORDER — GABAPENTIN 300 MG/1
CAPSULE ORAL
Qty: 180 CAPSULE | Refills: 3 | Status: SHIPPED | OUTPATIENT
Start: 2017-04-26 | End: 2018-05-23

## 2017-04-26 ASSESSMENT — PAIN SCALES - GENERAL: PAINLEVEL: MILD PAIN (3)

## 2017-04-26 NOTE — TELEPHONE ENCOUNTER
Pre-screening Questions for Radiology Injections:    Injection to be done at which interventional clinic site? [facility] - Luis Armando    Procedure ordered by Mallory Mayo    Procedure ordered?  Stellate ganglion block, right side    What insurance would patient like us to bill for this procedure? Work comp: Notes in Appointment section of chart: WC Approved- Stellate Ganglion Block series; Kamille confirmed with ALEJANDRA Weeks,  as well.       Worker's comp-Any injection DO NOT SCHEDULE and route to Korin Sharp.      [employer] insurance - For SI joint injections, DO NOT SCHEDULE and route Korin Sharp.      [employer]- MBB's must be scheduled at LEAST two weeks apart      [employer] - Any injection besides hip/shoulder/knee joint DO NOT SCHEDULE and route to Korin Sharp. She will obtain PA and call pt back to schedule procedure or notify pt of denial.     HP C[employer]-PA REQUIRED FOR NON-HARPAL OR Joint injections    Any chance of pregnancy? NO   If YES, do NOT schedule and route to RN pool    Is an  needed? No     Patient has a drive home? (mandatory) YES:     Is patient taking any blood thinners (plavix, coumadin, jantoven, warfarin, heparin, pradaxa or dabigatran )? No   If hold needed, do NOT schedule, route to RN pool     Is patient taking any aspirin products? No     If more than 325mg/day do NOT schedule; route to RN pool     For CERVICAL procedures, hold all aspirin products for 6 days.      Does the patient have a bleeding or clotting disorder? No     If yes, okay to schedule AND route to RN nurse pool    **For any patients with platelet count <100, must be forwarded to provider**    Is patient diabetic?  No  If YES, have them bring their glucometer.    Does patient have an active infection or treated for one within the past week? No     Is patient currently taking any antibiotics?  No     For patients on chronic, preventative, or prophylactic antibiotics,  procedures may be scheduled.     For patients on antibiotics for active or recent infection:    Torri Arambula [place], [place]-antibiotic course must have been completed for 4 days    Aleyda Cali-antibiotic course must have been completed for 7 days    Is patient currently taking any steroid medications? (i.e. Prednisone, Medrol)  No     For patients on steroid medications:    Torri Arambula [place], [place]-steroid course must have been completed for 4 days    Aleyda Cali-steroid course must have been completed for 7 days    Reviewed with patient:  If you are started on any steroids or antibiotics between now and your appointment, you must contact us because it may affect our ability to perform your procedure.  Yes    Is patient actively being treated for cancer or immunocompromised, including the spleen having been removed? No    If YES, do NOT schedule and route to RN pool     **For Dr. Woods patients without spleens should have the chart sent to her**    Are you able to get on and off an exam table with minimal or no assistance? Yes  If NO, do NOT schedule and route to RN pool    Are you able to roll over and lay on your stomach with minimal or no assistance? Yes  If NO, do NOT schedule and route to RN pool     Any allergies to contrast dye, iodine, shellfish, or numbing and steroid medications? No  If YES, route to RN pool AND add allergy information to appointment notes    Allergies: Codeine and Serzone [nefazodone hydrochloride]        Has the patient had a flu shot or any other vaccinations within 7 days before or after the procedure.  No       Does patient have an MRI/CT?  Not Applicable  (SI joint, hip injections, lumbar sympathetic blocks, and stellate ganglion blocks do not require an MRI)    Was the MRI done w/in the last 3 years?  NA    Was MRI done at [place]? No      If not, where was it done? N/A       If MRI was not done at [place], [facility] or [facility] do  NOT schedule and route to nursing.  If pt has an imaging disc, the injection may be scheduled but pt has to bring disc to appt. If they show up w/out disc the injection cannot be done    Reminders (please tell patient if applicable):       Instructed pt to arrive 30 minutes early for IV start if this is for a cervical procedure, ALL sympathetic (stellate ganglion, hypogastric, or lumbar sympathetic block) and all sedation procedures (RFA, spinal cord stimulation trials).  YES:     -IVs are not routinely placed for [place] cervical case       If NPO for sedation, informed patient that it is okay to take medications with sips of water (except if they are to hold blood thinners).  Not Applicable   *DO take blood pressure medication if it is prescribed*      If this is for a cervical HARPAL, informed patient that aspirin needs to be held for 6 days.   Not Applicable      For all patients not having spinal cord stimulator (SCS) trials or radiofrequency ablations (RFAs), informed patient:  IV sedation is not provided for this procedure.  If you feel that an oral anti-anxiety medication is needed, you can discuss this further with your referring provider or primary care provider.  The Pain Clinic provider will discuss specifics of what the procedure includes at your appointment.  Most procedures last 10-20 minutes.  We use numbing medications to help with any discomfort during the procedure.  Not Applicable      Do not schedule procedures requiring IV placement in the first appointment of the day or first appointment after lunch. OK      For patients 85 or older we recommend having an adult stay w/ them for the remainder of the day.   NA    Does the patient have any questions?  NO  TAMIKA MICHELLE  [facility]

## 2017-04-26 NOTE — PROGRESS NOTES
"[facility]    CHIEF COMPLAINT: Right wrist pain     INTERVAL HISTORY:  Last seen on 3/2/17.        Recommendations/plan at the last visit included:  1.  Schedule pain psychology visits. Reconsider Cymbalta at next visit.   2.  Continue occupational therapy visits   3.  Schedule follow-up with SELWYN Ferguson NP-C in 8 weeks  4.  Procedures recommended: Continue Stellate Ganglion Blocks every 2-3 weeks.   5.  Medication recommendations: No changes to current medication regimen.    Since her last visit, Diana DIANNE Buenrostro reports:  - Stellate ganglion x1, Wrist pain got a little worse after last injection, now it has stabilized and the last couple of weeks have been \"pretty good\", seems \"livable\".   - Completed health psychology. Okay to f/u as needed.     Pain Information:   Pain quality: Burning, Tender and Throbbing    Pain timing: Constant     Pain rating: intensity ranges from 3/10 to 6/10, and averages 4/10 on a 0-10 scale.   Aggravating factors include: Blocks PT   Relieving factors include: Over use      Annual Controlled Substance Agreement/UDS due date: 10/2017     CURRENT RELEVANT PAIN MEDICATIONS:   Lidocaine 5% ointment  Diclofenac gel apply to affected area 3-4 times daily  Norco 5/325 mg: Half to 1 tablet daily as needed for severe pain. #20 tablets to last 30 days     Patient is using the medication as prescribed: YES  Is your medication helpful? YES, but doesn't last very long   Medication side effects? denies any problems        Previous Pain Relevant Medications: (H--helped; HI--Helped initially; SWH--Somewhat helpful; NH--No help; W--worse; SE--side effects; ?--Unsure if helpful)   NOTE: This medication information taken from patient's intake form, not medical records.   Opiates: Norco: H post op  NSAIDS: none: only has one kidney, donated kidney to her sister  Muscle Relaxants: none  Anti-migraine mediations: none  Anti-depressants: none  Sleep aids:none  Anti-convulsants: " none   Topicals: none  Other medications not covered above: Tylenol: [facility]     Any illicit drug use: no  Any use of prescriptions other than how they were prescribed:no      [place] Board of Pharmacy Data Base Reviewed: YES; No concern for abuse or misuse of controlled medications based on this report.   Medications:  Current Outpatient Prescriptions   Medication Sig Dispense Refill     gabapentin (NEURONTIN) 300 MG capsule 600 mg  capsule 3     HYDROcodone-acetaminophen (NORCO) 5-325 MG per tablet 1/2 to 1 tablet daily as needed for moderate to severe pain. #20 tabs to last 30 days. 20 tablet 0     levothyroxine (SYNTHROID/LEVOTHROID) 50 MCG tablet Take 50 mcg on even days, and 25 mcg (1/2 tab) on odd days. 30 tablet 11     simvastatin (ZOCOR) 20 MG tablet Take 1 tablet (20 mg) by mouth At Bedtime 30 tablet 11     DULoxetine (CYMBALTA) 20 MG EC capsule Take 1 capsule (20 mg) by mouth 2 times daily 60 capsule 1     methocarbamol (ROBAXIN) 500 MG tablet Take 1-2 tablets (500-1,000 mg) by mouth 4 times daily as needed for muscle spasms 120 tablet 1     diclofenac (VOLTAREN) 1 % GEL Apply 2-4 grams to affected area four times daily 100 g 1     lidocaine (XYLOCAINE) 5 % ointment Apply topically 4 times daily as needed for moderate pain 100 g 1     Ascorbic Acid (VITAMIN C PO) Take 500 mg by mouth daily       [DISCONTINUED] gabapentin (NEURONTIN) 300 MG capsule 600 mg  capsule 1       Review of Systems: A 10-point review of systems was negative, with the exception of chronic pain issues.      Social History: Reviewed; unchanged from initial consultation.      Family history: Reviewed; unchanged from initial consultation.     PHYSICAL EXAM    Vitals:    04/26/17 1043   BP: 125/83   Pulse: 89   Weight: 79.4 kg (175 lb)       Constitutional: healthy, alert and mild distress, tearful  HEENT: Head atraumatic, normocephalic. Eyes without conjunctival injection or jaundice. Neck supple. No obvious neck  masses.  Skin: No rash, lesions, or petechiae of exposed skin.   Extremities: Peripheral pulses intact. No clubbing, cyanosis, or edema. Affected extremity is cooler than opposite extremity. No coloration difference today. Diminished strength.  Psychiatric/mental status: Alert, without lethargy or stupor. Appropriate affect. Mood anxious, tearful at times  Neurologic exam:  CN: Cranial nerves 2-12 are grossly normal.  Motor:  3/5 RUE, 5/5 LUE     Sensory exam:  Light touch: Painful in right upper extremity   RUE allodynia     DIRE Score for ongoing opioid management is calculated as follows:  Diagnosis = 2 pts (slowly progressive; moderate pain/objective findings)  Intractability = 3 pts (patient fully engaged but inadequate response to treatments)  Risk   Psych = 3 pts (no significant personality dysfunction/mental illness; good communication with clinic)   Chem Hlth = 3 pts (no history of chemical dependency; not drug-focused)  Reliability = 3 pts (highly reliable with meds, appointments, treatments)  Social = 3 pts (supportive family/close relationships; involved in work/school; no isolation)  (Psych + Chem hlth + Reliability + Social) = 17  Efficacy = 2 pts (moderate benefit/function; low med dose; too early/not tried meds)  DIRE Score = 19   7-13: likely NOT suitable candidate for long-term opioid analgesia  14-21: may be a suitable candidate for long-term opioid analgesia     DIAGNOSTIC TESTS:  Imaging Studies:   No new imaging     Assessment:   1. CRPS affecting the right upper extremity  2. Ganglion cyst, left wrist.   3. Depression, adjustment disorder    Diana presents in the company of her [facility] representative Jesusita. Diana is feeling better lately although has flare after last block. Is able to perform ADLs and light housework without difficulty. Notes fatigue and repetitive motions flare pain. Active in OT, 8 visits in last two months. Today she right hand has color difference and reduction in strength  as compared to left. Temp is equal. Strength is reduced as compared to left hand but is improved from previous assessments. I'd like her to schedule next stellate ganglion block before pain flares again.  Overall mode is improved as pain has improved.     Plan:    Diagnosis reviewed, treatment option addressed, and risk/benifits discussed.  Self-care instructions given.  I am recommending a multidisciplinary treatment plan to help this patient better manage pain.        1. Schedule occupational therapies  2. Schedule follow-up with SELWYN Ferguson, NP-C in 8 weeks  3. Medication recommendations: No changes to current regimen.  4. Schedule Stellate ganglion block in next few weeks     Total time spent face to face was 30 minutes and more than 50% of face to face time was spent in counseling and/or coordination of care regarding the diagnosis and recommendations above.      SELWYN Romo, NP-C   [facility]

## 2017-04-26 NOTE — LETTER
[facility]  [address]  [address]  [facility] [place] [address]  Phone: [phone]  Fax: [phone]      2017          REPORT OF WORK ABILITY  [facility]  [address]  [address]  [facility] [place] [address] [phone]  PATIENT DATA      Employee Name: Diana Buenrostro      : 1955      SS#: [id number]      Work related injury: Yes  Employer at time of injury: [employer]  Employer contact & phone: [phone]  Employed elsewhere? No  Workers' Compensation Carrier/Managed Care Plan:  [place] [employer]      Today's date: 2017  Date of injury: 12/8/15  Date of first visit: 16      PROVIDER EVALUATION: Please fill in as needed.  Please give copy to employee for employer.  1. Diagnosis: Complex Regional Pain Syndrome affecting right upper extremity  2. Treatment: Medication, nerve blocks (patient will call to discuss in 2 weeks),  OT/PT.  3. Medication: Gabapentin, lidoderm patches, diclofenac gel, Norco, Methocarbamol       NOTE: When ordering a medication, [place] Rules require Work Comp or WC on prescriptions.  4.  Continue with hand therapy  5. Return to work date: no work at this time. Will reevaluate at next clinic visit in 8 weeks.       RESTRICTIONS: Unlimited unless listed.  Restrictions apply to home and leisure also.  If work restrictions is not available, the employee is totally disabled.      Maximum Medical Improvement (Date): Unknown  Any Permanent Partial Disability? Deferred to future exam/consult.      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

## 2017-04-26 NOTE — NURSING NOTE
"Chief Complaint   Patient presents with     Pain       Initial /83  Pulse 89  Wt 79.4 kg (175 lb)  BMI 25.84 kg/m2 Estimated body mass index is 25.84 kg/(m^2) as calculated from the following:    Height as of 4/19/17: 1.753 m (5' 9\").    Weight as of this encounter: 79.4 kg (175 lb).  Medication Reconciliation: complete       Layne Butler MA  [facility]      "

## 2017-04-26 NOTE — PATIENT INSTRUCTIONS
After Visit Instructions:     Thank you for coming to [facility] for your care. It is my goal to partner with you to help you reach your optimal state of health.     I am recommending multidisciplinary care at this time.  The focus of care will be to continue gradual rehabilitation and pain management with medication adjustments as needed.    Continue daily self-care, identifying contributing factors, and monitoring variations in pain level. Continue to integrate self-care into your life.      1. Schedule occupational therapies  2. Schedule follow-up with SELWYN Ferguson NP-C in 8 weeks  3. Medication recommendations: No changes to current regimen.    SELWYN Romo, NP-C  [facility]    Contact information: [facility]    Please call if any side effects, questions, or concerns arise.    Nurse Triage line:  [phone]   Call this number with any questions or concerns. You may leave a detailed message anytime. Calls are typically returned Monday through Friday between 8 AM and 4:30 PM. We usually get back to you within 2 business days depending on the issue/request.    Scheduling number: [phone].  Call this number to schedule or change appointments.          Medication Refills Policy:    For non-narcotic medications, please your pharmacy directly to request a refill and the pharmacy will call the [facility] for authorization. Please allow 3-4 days for these refills.    For narcotic refills, call the nurse triage line and leave a message requesting your refill along with the name of the pharmacy that you use. Narcotic prescriptions will be mailed to your pharmacy or you may pick them up at the clinic.  Please call 7-10 days before your refill is due  The above policy allows adequate time so that you do not run out of medication.    No Show - Late Cancellation - Late Arrival Policy  We believe regular  attendance is key to your success in our program.    Any time you are unable to keep your appointment we ask that you call us at  least 24 hours in advance to let us know. This will allow us to offer the appointment time to another patient. The following is our policy for missed appointments. This also applies to appointments cancelled with less than 24 hours notice.    You will receive a letter after missing your 1st and 2nd appointments without contacting the clinic before your scheduled appointment time.     After missing 3 appointments without calling first, we will cancel all of your future appointments at [facility].    At that point, you will not be able to resume services unless approved by your care team  We understand that unforseen circumstances arise, however, out of respect for all concerned and to provide this appointment to another patient, this policy will be enforced.    Please note that most follow up appointments are 30 minutes long. If you arrive late, your provider may not be able to see you for the entire 30 minutes. Please also note that if you arrive more than 15 minutes for any appointment, it may be rescheduled.

## 2017-04-26 NOTE — MR AVS SNAPSHOT
After Visit Summary   4/26/2017    Diana Buenrostro    MRN: [id number]           Patient Information     Date Of Birth          1955        Visit Information        Provider Department      4/26/2017 11:00 AM Mallory Mayo APRN CNP [facility]        Today's Diagnoses     Complex regional pain syndrome type 2 of right lower extremity          Care Instructions    After Visit Instructions:     Thank you for coming to [facility] for your care. It is my goal to partner with you to help you reach your optimal state of health.     I am recommending multidisciplinary care at this time.  The focus of care will be to continue gradual rehabilitation and pain management with medication adjustments as needed.    Continue daily self-care, identifying contributing factors, and monitoring variations in pain level. Continue to integrate self-care into your life.      1. Schedule occupational therapies  2. Schedule follow-up with SELWYN Ferguson NP-C in 8 weeks  3. Medication recommendations: No changes to current regimen.    SELWYN Romo NP-C  [facility]    Contact information: [facility]    Please call if any side effects, questions, or concerns arise.    Nurse Triage line:  [phone]   Call this number with any questions or concerns. You may leave a detailed message anytime. Calls are typically returned Monday through Friday between 8 AM and 4:30 PM. We usually get back to you within 2 business days depending on the issue/request.    Scheduling number: [phone].  Call this number to schedule or change appointments.          Medication Refills Policy:    For non-narcotic medications, please your pharmacy directly to request a refill and the pharmacy will call the [facility] [place] for authorization. Please allow 3-4 days for these refills.    For narcotic refills, call the  nurse triage line and leave a message requesting your refill along with the name of the pharmacy that you use. Narcotic prescriptions will be mailed to your pharmacy or you may pick them up at the clinic.  Please call 7-10 days before your refill is due  The above policy allows adequate time so that you do not run out of medication.    No Show - Late Cancellation - Late Arrival Policy  We believe regular attendance is key to your success in our program.    Any time you are unable to keep your appointment we ask that you call us at  least 24 hours in advance to let us know. This will allow us to offer the appointment time to another patient. The following is our policy for missed appointments. This also applies to appointments cancelled with less than 24 hours notice.    You will receive a letter after missing your 1st and 2nd appointments without contacting the clinic before your scheduled appointment time.     After missing 3 appointments without calling first, we will cancel all of your future appointments at [facility].    At that point, you will not be able to resume services unless approved by your care team  We understand that unforseen circumstances arise, however, out of respect for all concerned and to provide this appointment to another patient, this policy will be enforced.    Please note that most follow up appointments are 30 minutes long. If you arrive late, your provider may not be able to see you for the entire 30 minutes. Please also note that if you arrive more than 15 minutes for any appointment, it may be rescheduled.                                   Follow-ups after your visit        Who to contact     If you have questions or need follow up information about today's clinic visit or your schedule please contact [facility] directly at [phone].  Normal or non-critical lab and imaging results will be communicated to you by MyChart, letter or phone  within 4 business days after the clinic has received the results. If you do not hear from us within 7 days, please contact the clinic through [employer] or phone. If you have a critical or abnormal lab result, we will notify you by phone as soon as possible.  Submit refill requests through [employer] or call your pharmacy and they will forward the refill request to us. Please allow 3 business days for your refill to be completed.          Additional Information About Your Visit        [employer] Information     [employer] gives you secure access to your electronic health record. If you see a primary care provider, you can also send messages to your care team and make appointments. If you have questions, please call your primary care clinic.  If you do not have a primary care provider, please call [phone] and they will assist you.        Care EveryWhere ID     This is your Care EveryWhere ID. This could be used by other organizations to access your [facility] medical records  [id number]        Your Vitals Were     Pulse BMI (Body Mass Index)                89 25.84 kg/m2           Blood Pressure from Last 3 Encounters:   04/26/17 125/83   04/03/17 123/80   03/02/17 128/83    Weight from Last 3 Encounters:   04/26/17 79.4 kg (175 lb)   04/19/17 78.7 kg (173 lb 6.4 oz)   02/28/17 77.7 kg (171 lb 6.4 oz)              Today, you had the following     No orders found for display         Where to get your medicines      These medications [facility] sent to [facility] [place] - [address]  [address], [place] [address]    Hours:  [facility] Phone:  [phone]     gabapentin 300 MG capsule          Primary Care Provider Office Phone # Fax #    Samantha SELWYN Roldan [phone]       [facility] [address] [place] [id number]        Thank you!     Thank you for choosing [facility] [employer]  for your care. Our goal is always to  provide you with excellent care. Hearing back from our patients is one way we can continue to improve our services. Please take a few minutes to complete the written survey that you may receive in the mail after your visit with us. Thank you!             Your Updated Medication List - Protect others around you: Learn how to safely use, store and throw away your medicines at www.disposemymeds.org.          This list is accurate as of: 4/26/17 11:12 AM.  Always use your most recent med list.                   Brand Name Dispense Instructions for use    diclofenac 1 % Gel topical gel    VOLTAREN    100 g    Apply 2-4 grams to affected area four times daily       DULoxetine 20 MG EC capsule    CYMBALTA    60 capsule    Take 1 capsule (20 mg) by mouth 2 times daily       gabapentin 300 MG capsule    NEURONTIN    180 capsule    600 mg TID       HYDROcodone-acetaminophen 5-325 MG per tablet    NORCO    20 tablet    1/2 to 1 tablet daily as needed for moderate to severe pain. #20 tabs to last 30 days.       levothyroxine 50 MCG tablet    SYNTHROID/LEVOTHROID    30 tablet    Take 50 mcg on even days, and 25 mcg (1/2 tab) on odd days.       lidocaine 5 % ointment    XYLOCAINE    100 g    Apply topically 4 times daily as needed for moderate pain       methocarbamol 500 MG tablet    ROBAXIN    120 tablet    Take 1-2 tablets (500-1,000 mg) by mouth 4 times daily as needed for muscle spasms       simvastatin 20 MG tablet    ZOCOR    30 tablet    Take 1 tablet (20 mg) by mouth At Bedtime       VITAMIN C PO      Take 500 mg by mouth daily

## 2017-05-04 ENCOUNTER — HOSPITAL ENCOUNTER (OUTPATIENT)
Dept: OCCUPATIONAL THERAPY | Facility: CLINIC | Age: 62
Setting detail: THERAPIES SERIES
End: 2017-05-04
Attending: NURSE PRACTITIONER
Payer: OTHER MISCELLANEOUS

## 2017-05-04 PROCEDURE — 40000839 ZZH STATISTIC HAND THERAPY VISIT: Performed by: OCCUPATIONAL THERAPIST

## 2017-05-04 PROCEDURE — 97110 THERAPEUTIC EXERCISES: CPT | Mod: GO | Performed by: OCCUPATIONAL THERAPIST

## 2017-05-11 ENCOUNTER — HOSPITAL ENCOUNTER (OUTPATIENT)
Dept: OCCUPATIONAL THERAPY | Facility: CLINIC | Age: 62
Setting detail: THERAPIES SERIES
End: 2017-05-11
Attending: NURSE PRACTITIONER
Payer: OTHER MISCELLANEOUS

## 2017-05-11 PROCEDURE — 97110 THERAPEUTIC EXERCISES: CPT | Mod: GO | Performed by: OCCUPATIONAL THERAPIST

## 2017-05-11 PROCEDURE — 97022 WHIRLPOOL THERAPY: CPT | Mod: GO | Performed by: OCCUPATIONAL THERAPIST

## 2017-05-11 PROCEDURE — 40000839 ZZH STATISTIC HAND THERAPY VISIT: Performed by: OCCUPATIONAL THERAPIST

## 2017-05-17 ENCOUNTER — HOSPITAL ENCOUNTER (OUTPATIENT)
Dept: OCCUPATIONAL THERAPY | Facility: CLINIC | Age: 62
Setting detail: THERAPIES SERIES
End: 2017-05-17
Attending: NURSE PRACTITIONER
Payer: OTHER MISCELLANEOUS

## 2017-05-17 PROCEDURE — 40000839 ZZH STATISTIC HAND THERAPY VISIT: Performed by: OCCUPATIONAL THERAPIST

## 2017-05-17 PROCEDURE — 97110 THERAPEUTIC EXERCISES: CPT | Mod: GO | Performed by: OCCUPATIONAL THERAPIST

## 2017-05-23 ENCOUNTER — RADIANT APPOINTMENT (OUTPATIENT)
Dept: RADIOLOGY | Facility: CLINIC | Age: 62
End: 2017-05-23
Attending: ANESTHESIOLOGY

## 2017-05-23 ENCOUNTER — RADIOLOGY INJECTION OFFICE VISIT (OUTPATIENT)
Dept: PALLIATIVE MEDICINE | Facility: CLINIC | Age: 62
End: 2017-05-23
Payer: OTHER MISCELLANEOUS

## 2017-05-23 VITALS — SYSTOLIC BLOOD PRESSURE: 129 MMHG | OXYGEN SATURATION: 100 % | DIASTOLIC BLOOD PRESSURE: 84 MMHG | HEART RATE: 73 BPM

## 2017-05-23 DIAGNOSIS — G56.41 COMPLEX REGIONAL PAIN SYNDROME TYPE 2 OF RIGHT UPPER EXTREMITY: Primary | ICD-10-CM

## 2017-05-23 DIAGNOSIS — G90.511 COMPLEX REGIONAL PAIN SYNDROME TYPE 1 OF RIGHT UPPER EXTREMITY: ICD-10-CM

## 2017-05-23 PROCEDURE — 64510 N BLOCK STELLATE GANGLION: CPT | Performed by: ANESTHESIOLOGY

## 2017-05-23 ASSESSMENT — PAIN SCALES - GENERAL
PAINLEVEL: MODERATE PAIN (5)
PAINLEVEL: MODERATE PAIN (4)

## 2017-05-23 NOTE — NURSING NOTE
Discharge Information    IV Discontiued Time: 1146 catheter intact    Amount of Fluid Infused:  NA    Discharge Criteria = When patient returns to baseline or as per MD order    Consciousness:  Pt is fully awake    Circulation:  BP +/- 20% of pre-procedure level    Respiration:  Patient is able to breathe deeply    O2 Sat:  Patient is able to maintain O2 Sat >92% on room air    Activity:  Moves 4 extremities on command    Ambulation:  Patient is able to stand and walk or stand and pivot into wheelchair    Dressing:  Clean/dry or No Dressing    Notes:   Discharge instructions and AVS given to patient    Patient meets criteria for discharge?  YES    Admitted to PCU?  No    Responsible adult present to accompany patient home?  Yes    Signature/Title:    Tiana Etienne RN Care Coordinator  [facility]

## 2017-05-23 NOTE — PROGRESS NOTES
Pre procedure Diagnosis: CRPS of the right upper extremity  Post proceudure Diagnosis:Same  Procedure performed: Stellate ganglion block at right C7 under fluorsoscopic guidance  Anesthesia: local  Complications: none  Operators: Gaurang Pena MD    INDICATIONS:   Diana Buenrostro is a 61 year old female with a h/o CRPS of the right upper extremity.  She was sent by SELWYN Ferguson for a repeat right stellate ganglion block. Options, benefits and risks were discussed with the patient including bleeding, infection, hoarseness, droopy face/eye, swallowing problems, no pain relief, seizure, stroke, paralysis, nerve injury, spinal cord injury, spinal headache, spinal fluid leak, coma, death. Questions were answered to her satisfaction and she agrees to proceed. Voluntary informed consent was obtained and signed.  The patient's medical history, medications, and allergies were reviewed and reconciled.    PROCEDURE IN DETAIL:  After obtaining signed informed consent the patient was brought into the procedure suit and placed in a supine position on the procedure table. Patient's neck area was prepped and draped in the usual sterile fashion. The transverse process of C7 on the right side was identified under AP fluoroscopic guidance. Soft tissue of her neck were retracted using my left hand until palpation of transverse process was obtained. 1 ml of 1% lidocaine was injected at the needle entry point and needle tract using a 30 gauge 1 inch needle. Then a 27 gauge 3.5 inch quincke type spinal needle was inserted and slowly advanced under fluoroscopic guidance until bony contact was made. Lateral fluoroscopic guidance was also obtained to confirm the needle depth and position. After negative aspiration for heme and CSF, 1 cc of Omnipaque contrast dye was injected. There was no vascular uptake, the contrast was spreading distally.  Omnipaque wasted:  4 ml.  Then, after negative aspiration, a test dose was performed  by injecting Lidocaine 1% 2 ml which was negative for adverse effects.  Then I injected 8 ml of 0.2% Ropivicaine slowly and incrementally with frequent intermittent aspirations. The needle was then restyletted and withdrawn, the patient's neck was cleansed and patient was brought to the recovery area.     In the recovery area patient reported some pain relief. Patient had Lili syndrome which is an indication of a successful stellate ganglion block. Patient's pre procedure  pain intensity score was 5-6/10 and post procedure pain intensity score was 4/10. Patient will continue monitoring progress and we will see the patient again in the clinic for follow up  in 2-3 weeks. The patient was then discharged in good condition with discharge instructions.    Total fluoroscopy time was 12 seconds.     Gaurang Pena MD  [facility]

## 2017-05-23 NOTE — NURSING NOTE
"Chief Complaint   Patient presents with     Pain     Right arm pain        Initial /84  Pulse 71 Estimated body mass index is 25.84 kg/(m^2) as calculated from the following:    Height as of 4/19/17: 1.753 m (5' 9\").    Weight as of 4/26/17: 79.4 kg (175 lb).  Medication Reconciliation: complete     Injection intake:    If this procedure is requiring IV sedation has patient been NPO for 6  Hours? NA    Is patient on coumadin, plavix or other prescribed blood thinner?   No    If patient is on coumadin was it held for 5 days?   NA    If patient is on plavix was it held for 7 days?    NA     Does patient take aspirin?  No    If this is for a cervical procedure and patient is on aspirin has it been held for 6 days?   NA    Any allergies to contrast dye, iodine, steroid and/or numbing medications?  NO    Is patient currently taking antibiotics or have an active infection?  NO    Does patient have a ? Yes       Is patient pregnant or breastfeeding?  NO    Are the vital signs normal?  Yes    Sandra Gallego MA      "

## 2017-05-23 NOTE — PATIENT INSTRUCTIONS
[facility]   Procedure Discharge Instructions    Today you saw:  Dr. Gaurang Pena    You had an:  Stellate Ganglion Block:  You may have one or more of the following: difficulty swallowing, hoarseness, sore throat, mild swelling or bruising or the neck, drooping or redness of the eyelid on the side injected, a very small pupil on the side injected, weakness of the arm on the side injected.  These symptons should go away within 24 hours  If you experience chest pain, shortness of breath or problems breathing go to your local Emergency Room    Do not eat until the lump in your throat goes down (usually about 4 hours)  Do not drink hot drinks for the next 12 hours  You may take small sips of cool drinks or ice chips  Use a sling to protect your arm if needed      Medications used:  Lidocaine Omnipaque  Ropivicaine             Be cautious when walking. Numbness and/or weakness in the lower extremities may occur up to 6-8 hours after the procedure due to effect of the local anesthetic    Do not drive for 6 hours. The effect of the local anesthetic could slow your reflexes.     You may resume your regular activities after 24 hours    Avoid strenuous activity for the first 24 hours    You may shower, however avoid swimming, tub baths or hot tubs for 24 hours following your procedure    You may have a mild to moderate increase in pain for several days following the injection.    You may use ice packs for 10-15 minutes, 3 to 4 times a day at the injection site for comfort    Do not use heat to painful areas for 6 to 8 hours. This will give the local anesthetic time to wear off and prevent you from accidentally burning your skin.     You may use anti-inflammatory medications (such as Ibuprofen or Aleve or Advil) or Tylenol for pain control if necessary    If you were fasting, you may resume your normal diet and medications after the procedure    If you experience any of the following, call the pain  center nursing line during work hours at [phone] or the after hours provider line at [phone]:  -Fever over 100 degree F  -Swelling, bleeding, redness, drainage, warmth at the injection site  -Progressive weakness or numbness in your arms  -Unusual headache that is not relieved by Tylenol  -Unusual new onset of pain that is not improving      Phone #s:  Appointment line: [phone];  Nurse line: [phone]

## 2017-05-23 NOTE — MR AVS SNAPSHOT
After Visit Summary   5/23/2017    Diana Buenrostro    MRN: [id number]           Patient Information     Date Of Birth          1955        Visit Information        Provider Department      5/23/2017 10:15 AM Gaurang Tolentino MD [facility]        Care Instructions    [facility]   Procedure Discharge Instructions    Today you saw:  Dr. Gaurang Pena    You had an:  Stellate Ganglion Block:  You may have one or more of the following: difficulty swallowing, hoarseness, sore throat, mild swelling or bruising or the neck, drooping or redness of the eyelid on the side injected, a very small pupil on the side injected, weakness of the arm on the side injected.  These symptons should go away within 24 hours  If you experience chest pain, shortness of breath or problems breathing go to your local Emergency Room    Do not eat until the lump in your throat goes down (usually about 4 hours)  Do not drink hot drinks for the next 12 hours  You may take small sips of cool drinks or ice chips  Use a sling to protect your arm if needed      Medications used:  Lidocaine Omnipaque  Ropivicaine             Be cautious when walking. Numbness and/or weakness in the lower extremities may occur up to 6-8 hours after the procedure due to effect of the local anesthetic    Do not drive for 6 hours. The effect of the local anesthetic could slow your reflexes.     You may resume your regular activities after 24 hours    Avoid strenuous activity for the first 24 hours    You may shower, however avoid swimming, tub baths or hot tubs for 24 hours following your procedure    You may have a mild to moderate increase in pain for several days following the injection.    You may use ice packs for 10-15 minutes, 3 to 4 times a day at the injection site for comfort    Do not use heat to painful areas for 6 to 8 hours. This will give the local anesthetic time to wear off and prevent you from  accidentally burning your skin.     You may use anti-inflammatory medications (such as Ibuprofen or Aleve or Advil) or Tylenol for pain control if necessary    If you were fasting, you may resume your normal diet and medications after the procedure    If you experience any of the following, call the pain center nursing line during work hours at [phone] or the after hours provider line at [phone]:  -Fever over 100 degree F  -Swelling, bleeding, redness, drainage, warmth at the injection site  -Progressive weakness or numbness in your arms  -Unusual headache that is not relieved by Tylenol  -Unusual new onset of pain that is not improving      Phone #s:  Appointment line: [phone];  Nurse line: [phone]            Follow-ups after your visit        Your next 10 appointments already scheduled     May 25, 2017 10:00 AM CDT   Hand Treatment with Keri Ramsey OT   [facility] ([facility])    [address]  [address]  [facility] [address]   [phone]            Jun 27, 2017 10:30 AM CDT   Return Visit with SELWYN Gaona CNP   [facility]    [address]  [facility] [address] [phone]              Who to contact     If you have questions or need follow up information about today's clinic visit or your schedule please contact [facility] directly at [phone].  Normal or non-critical lab and imaging results will be communicated to you by MyChart, letter or phone within 4 business days after the clinic has received the results. If you do not hear from us within 7 days, please contact the clinic through MyChart or phone. If you have a critical or abnormal lab result, we will notify you by phone as soon as possible.  Submit refill requests through [email or web address] or call your pharmacy and they will forward the refill request to us. Please allow 3 business days for your  refill to be completed.          Additional Information About Your Visit        [employer] Information     [employer] gives you secure access to your electronic health record. If you see a primary care provider, you can also send messages to your care team and make appointments. If you have questions, please call your primary care clinic.  If you do not have a primary care provider, please call [phone] and they will assist you.        Care EveryWhere ID     This is your Care EveryWhere ID. This could be used by other organizations to access your [place] medical records  [id number]        Your Vitals Were     Pulse                   71            Blood Pressure from Last 3 Encounters:   05/23/17 136/84   04/26/17 125/83   04/03/17 123/80    Weight from Last 3 Encounters:   04/26/17 79.4 kg (175 lb)   04/19/17 78.7 kg (173 lb 6.4 oz)   02/28/17 77.7 kg (171 lb 6.4 oz)              Today, you had the following     No orders found for display       Primary Care Provider Office Phone # Fax #    Samantha SELWYN Roldan [facility] [phone]       [facility] [address] [address]        Thank you!     Thank you for choosing [facility]  for your care. Our goal is always to provide you with excellent care. Hearing back from our patients is one way we can continue to improve our services. Please take a few minutes to complete the written survey that you may receive in the mail after your visit with us. Thank you!             Your Updated Medication List - Protect others around you: Learn how to safely use, store and throw away your medicines at www.disposemymeds.org.          This list is accurate as of: 5/23/17 11:38 AM.  Always use your most recent med list.                   Brand Name Dispense Instructions for use    diclofenac 1 % Gel topical gel    VOLTAREN    100 g    Apply 2-4 grams to affected area four times daily       DULoxetine 20 MG EC capsule    CYMBALTA    60  capsule    Take 1 capsule (20 mg) by mouth 2 times daily       gabapentin 300 MG capsule    NEURONTIN    180 capsule    600 mg TID       HYDROcodone-acetaminophen 5-325 MG per tablet    NORCO    20 tablet    1/2 to 1 tablet daily as needed for moderate to severe pain. #20 tabs to last 30 days.       levothyroxine 50 MCG tablet    SYNTHROID/LEVOTHROID    30 tablet    Take 50 mcg on even days, and 25 mcg (1/2 tab) on odd days.       lidocaine 5 % ointment    XYLOCAINE    100 g    Apply topically 4 times daily as needed for moderate pain       methocarbamol 500 MG tablet    ROBAXIN    120 tablet    Take 1-2 tablets (500-1,000 mg) by mouth 4 times daily as needed for muscle spasms       simvastatin 20 MG tablet    ZOCOR    30 tablet    Take 1 tablet (20 mg) by mouth At Bedtime       VITAMIN C PO      Take 500 mg by mouth daily

## 2017-05-23 NOTE — NURSING NOTE
20 gauge Peripheral IV inserted into left anticubital - attempts: 1    Jesusita Etienne RN, [facility]  Pain Clinic Care Coordinator

## 2017-06-01 ENCOUNTER — HOSPITAL ENCOUNTER (OUTPATIENT)
Dept: OCCUPATIONAL THERAPY | Facility: CLINIC | Age: 62
Setting detail: THERAPIES SERIES
End: 2017-06-01
Attending: NURSE PRACTITIONER
Payer: OTHER MISCELLANEOUS

## 2017-06-01 PROCEDURE — 40000839 ZZH STATISTIC HAND THERAPY VISIT: Performed by: OCCUPATIONAL THERAPIST

## 2017-06-01 PROCEDURE — 97110 THERAPEUTIC EXERCISES: CPT | Mod: GO | Performed by: OCCUPATIONAL THERAPIST

## 2017-06-02 ENCOUNTER — MYC MEDICAL ADVICE (OUTPATIENT)
Dept: PALLIATIVE MEDICINE | Facility: CLINIC | Age: 62
End: 2017-06-02

## 2017-06-02 NOTE — TELEPHONE ENCOUNTER
Aminta from patient Created: 6/2/2017 10:21 AM    Higinio Tejada,  Just wanted to check in. I had a block on Tuesday May 23rd. I was starting to feel a bit more pain before the block. Sometimes after the block I feel warmth and then sometimes it can take up to a week to get relief. But I have not felt any relief and my pain, and swelling have increased some. Also I have had a bit of color change again off and on. What do you think? Should I try another block or give it more time? Also I have had some itching on the back and sides of both wrists. I asked Dr. Christina Pena if this could be from the CRPS nerve related and he said yes. What are your thoughts on that?     Thanks, Diana

## 2017-06-08 ENCOUNTER — HOSPITAL ENCOUNTER (OUTPATIENT)
Dept: OCCUPATIONAL THERAPY | Facility: CLINIC | Age: 62
Setting detail: THERAPIES SERIES
End: 2017-06-08
Attending: NURSE PRACTITIONER
Payer: OTHER MISCELLANEOUS

## 2017-06-08 PROCEDURE — 97110 THERAPEUTIC EXERCISES: CPT | Mod: GO | Performed by: OCCUPATIONAL THERAPIST

## 2017-06-08 PROCEDURE — 40000839 ZZH STATISTIC HAND THERAPY VISIT: Performed by: OCCUPATIONAL THERAPIST

## 2017-06-12 ENCOUNTER — HOSPITAL ENCOUNTER (OUTPATIENT)
Dept: OCCUPATIONAL THERAPY | Facility: CLINIC | Age: 62
Setting detail: THERAPIES SERIES
End: 2017-06-12
Attending: NURSE PRACTITIONER
Payer: OTHER MISCELLANEOUS

## 2017-06-12 PROCEDURE — 40000839 ZZH STATISTIC HAND THERAPY VISIT: Performed by: OCCUPATIONAL THERAPIST

## 2017-06-12 PROCEDURE — 97022 WHIRLPOOL THERAPY: CPT | Mod: GO | Performed by: OCCUPATIONAL THERAPIST

## 2017-06-12 PROCEDURE — 97110 THERAPEUTIC EXERCISES: CPT | Mod: GO | Performed by: OCCUPATIONAL THERAPIST

## 2017-06-21 ENCOUNTER — HOSPITAL ENCOUNTER (OUTPATIENT)
Dept: OCCUPATIONAL THERAPY | Facility: CLINIC | Age: 62
Setting detail: THERAPIES SERIES
End: 2017-06-21
Attending: NURSE PRACTITIONER
Payer: OTHER MISCELLANEOUS

## 2017-06-21 PROCEDURE — 97535 SELF CARE MNGMENT TRAINING: CPT | Mod: GO | Performed by: OCCUPATIONAL THERAPIST

## 2017-06-21 PROCEDURE — 40000839 ZZH STATISTIC HAND THERAPY VISIT: Performed by: OCCUPATIONAL THERAPIST

## 2017-06-21 NOTE — PROGRESS NOTES
06/21/17 0500   Notes   Note Type Discharge Summary   Providers   Providers Keri Ramsey OTR/L, CHT   Referring Physician Mallory Mayo CNP   Reporting Period   Reporting period from 04/24/17   Reporting period to 06/21/17   General Information   Rxs Authorized 44 ( July 27)   Rxs Used 42 ( 6 since last progress note)   Medical Diagnosis Right CRPS   Orders Evaluate And Treat As Indicated   Insurance WC   Start Of Care Date 10/26/16   Onset date of current episode/exacerbation 12/08/15   Surgical procedure ORIF   Date of surgery 12/10/15   Date for Next MD Appointment 06/28/17   Other pertinent information July 11 next block   Subjective Measures   Subjective Patient relates she felt worse after last shot but now feels like she is better again.   Initial Pain level 10/10  (at worst and 3 at best)   Current Pain level 2/10   Patient Reported % Functional Improvement overall 50-60% better   Functional Improvement Reported Self care activities  (hard to qualify as everyday is so different)   Objective Measures   Objective Measures Strength;Objective Measure 1;Objective Measure 2;Objective Measure 3   Edema   Location (anatomical) DWC   Location Right;Left   Edema Comments 16.5,16.5 (was 17 bilaterally) ( Same as last PN)   ROM   Location (anatomical) wrist    Location Right   Motion Ext/Flex   ROM Comments 60/75( same as last PN)   Strength   Location Right;Left    22,22#,24# ( Was 10 last prgress not) left 22,,36,39## was 20 last proress note   Maya Pinch 8# (was 6 # last progress note),14( Was1# last progress note)   Lateral Pinch 10# ( was 10#), left 12# (was 10#)   Objective Measure 1   Objective Measure hypersensitivity wrist   Details Moderate - Patient feels that it is about the same- not better but not worse either   Modalities   Fluidotherapy -Duration (5 min)   Therapeutic Exercise   Skilled Interventions (verbal and physical cuing)   Self Care/Home Management   Comments 25 minutes to educate with  activities and how to perform on her own to continue with  CRPS program.   Hand Goals   Hand Goals Household Chores;Sports/Recreation;Hygiene/Toileting;Sleeping   Hygiene/Toileting   Current Functional Task Brushing/combing hair  (washing hair)   Previous Performance Level Moderate limitations   Current Performance Level Mild difficulty  (mild on  a good)   Goal Target Task (wash hair)   Goal Target Performance Level No difficulty   Due Date 07/09/17   If goal not met, Why? Feels like she did not have any difficulty. Patient feels like she has been OK with this level of performance for  , other than the few weeks past the last block. for the last month   Household Chores   Current Functional Task Carrying;Gripping   Previous Performance Level Moderate limitations   Current Performance Level Moderate difficulty  (with carrying - moderate with opening jar)   Goal Target Task Carry a shopping bag;Open a tight or new jar   Goal Target Performance Level Mild difficulty   Due Date 07/09/17   If goal not met, Why? Has been the same now for the past 2 months- has plateaued   Sleeping   Current Functional Task Sleeping through the night   Previous Performance Level Moderate difficulty   Current Performance Level Mild difficulty   Goal Target Task (to sleep 7-8 hours a night without wakeing)   Goal Target Performance Level Mild difficulty   Due Date 07/14/17   Date Goal Met 06/21/17   Sports/Recreation   Current Functional Task Gripping   Previous Performance Level Moderate limitations   Curent Performance Level Moderate difficulty  (depening on day can go from mod to severe.)   Goal Target Task (to hold onto treadmill)   Goal Target Performance Level Mild difficulty   Due Date 07/07/17   If goal not met, Why? Has figured out how to adapt with level of function that she is at.   Assessment   Clinical Impression(s) Comments Overall patient has plateaued over the last 2 months. She has her good and bad days and knows how to  manage at home. No more hand therapy recommended at this time unless there is a change in status.   Response to Therapy: Lack of Improvement Strength   Response to Therapy: Improvements Pain;Flexibility   Education   Learner Patient   Readiness Eager   Method Booklet/handout;Explanation;Demonstration   Response Verbalizes understanding;Demonstrates understanding   Plan   Home program stretching, weight bearing, desensitization, mirror therapy,  strengthening, functional use   Plan Plan to D/C to home program at this time.   Total Session Time   Total Session Time (In Minutes) 25   Keri Ramsey OTR/L CHT  Occupational Therapist, Certified Hand Therapist

## 2017-06-28 ENCOUNTER — OFFICE VISIT (OUTPATIENT)
Dept: PALLIATIVE MEDICINE | Facility: CLINIC | Age: 62
End: 2017-06-28
Payer: OTHER MISCELLANEOUS

## 2017-06-28 VITALS — SYSTOLIC BLOOD PRESSURE: 137 MMHG | HEART RATE: 82 BPM | DIASTOLIC BLOOD PRESSURE: 85 MMHG

## 2017-06-28 DIAGNOSIS — G56.41 COMPLEX REGIONAL PAIN SYNDROME TYPE 2 OF RIGHT UPPER EXTREMITY: Primary | ICD-10-CM

## 2017-06-28 PROCEDURE — 99214 OFFICE O/P EST MOD 30 MIN: CPT | Performed by: NURSE PRACTITIONER

## 2017-06-28 ASSESSMENT — PAIN SCALES - GENERAL: PAINLEVEL: MODERATE PAIN (4)

## 2017-06-28 NOTE — PROGRESS NOTES
[facility]    CHIEF COMPLAINT: Pain in right wrist    INTERVAL HISTORY:  Last seen on 4/26/17      Recommendations/plan at the last visit included:  1. Schedule occupational therapies  2. Schedule follow-up with SELWYN Ferguson NP-C in 8 weeks  3. Medication recommendations:                No changes to current regimen.  4. Schedule Stellate ganglion block in next few weeks     Since her last visit, Diana DIANNE Buenrostro reports:  - Had flare after last injection 5/23/17. Not taking any pain medication, used topicals  During flare. Was not able to get another injection until 7/11/17  - Discharged from OT.     Pain Information:   Pain quality: Aching, Burning, Numb, Penetrating, Stabbing, Tender and Tiring    Pain timing: Constant     Pain rating: intensity ranges from 2/10 to 7/10, and averages 3/10 on a 0-10 scale.   Aggravating factors include: nothing noted   Relieving factors include: noting noted      Annual Controlled Substance Agreement/UDS due date: 10/2017      CURRENT RELEVANT PAIN MEDICATIONS:   Lidocaine 5% ointment  Diclofenac gel apply to affected area 3-4 times daily  Norco 5/325 mg: Half to 1 tablet daily as needed for severe pain. #20 tablets to last 30 days      Patient is using the medication as prescribed: YES  Is your medication helpful? YES, but doesn't last very long   Medication side effects? denies any problems          Previous Pain Relevant Medications: (H--helped; HI--Helped initially; SWH--Somewhat helpful; NH--No help; W--worse; SE--side effects; ?--Unsure if helpful)   NOTE: This medication information taken from patient's intake form, not medical records.   Opiates: Norco: H post op  NSAIDS: none: only has one kidney, donated kidney to her sister  Muscle Relaxants: none  Anti-migraine mediations: none  Anti-depressants: none  Sleep aids:none  Anti-convulsants: none   Topicals: none  Other medications not covered above: Tylenol: [facility]      Any illicit drug use:  no  Any use of prescriptions other than how they were prescribed:no        [place] Board of Pharmacy Data Base Reviewed: YES; No concern for abuse or misuse of controlled medications based on this report.     SELF CARE:   How often do you practice SELF-CARE (relaxing, stretching, pacing, monitoring posture, taking mini-breaks) in a typical day:  Few times per day      Medications:  Current Outpatient Prescriptions   Medication Sig Dispense Refill     DULoxetine (CYMBALTA) 20 MG EC capsule Take 1 capsule (20 mg) by mouth 2 times daily 60 capsule 2     gabapentin (NEURONTIN) 300 MG capsule 600 mg  capsule 3     HYDROcodone-acetaminophen (NORCO) 5-325 MG per tablet 1/2 to 1 tablet daily as needed for moderate to severe pain. #20 tabs to last 30 days. 20 tablet 0     levothyroxine (SYNTHROID/LEVOTHROID) 50 MCG tablet Take 50 mcg on even days, and 25 mcg (1/2 tab) on odd days. 30 tablet 11     simvastatin (ZOCOR) 20 MG tablet Take 1 tablet (20 mg) by mouth At Bedtime 30 tablet 11     methocarbamol (ROBAXIN) 500 MG tablet Take 1-2 tablets (500-1,000 mg) by mouth 4 times daily as needed for muscle spasms 120 tablet 1     diclofenac (VOLTAREN) 1 % GEL Apply 2-4 grams to affected area four times daily 100 g 1     lidocaine (XYLOCAINE) 5 % ointment Apply topically 4 times daily as needed for moderate pain 100 g 1     Ascorbic Acid (VITAMIN C PO) Take 500 mg by mouth daily         Review of Systems: A 10-point review of systems was negative, with the exception of chronic pain issues.      Social History: Reviewed; unchanged from initial consultation.      Family history: Reviewed; unchanged from initial consultation.     PHYSICAL EXAM    Vitals:    06/28/17 1121   BP: 137/85   BP Location: Left arm   Patient Position: Chair   Cuff Size: Adult Regular   Pulse: 82       Constitutional: healthy, alert and no distress  HEENT: Head atraumatic, normocephalic. Eyes without conjunctival injection or jaundice. Neck supple. No  obvious neck masses.  Skin: No rash, lesions, or petechiae of exposed skin.   Extremities: Peripheral pulses intact. No clubbing, cyanosis, or edema. Affected extremity is cooler than opposite extremity. No coloration difference today. Diminished strength.  Psychiatric/mental status: Alert, without lethargy or stupor. Appropriate affect. Mood anxious, tearful at times  Neurologic exam:  CN: Cranial nerves 2-12 are grossly normal.  Motor:  3/5 RUE, 5/5 LUE      Sensory exam:  Light touch: Painful in right upper extremity   RUE allodynia      DIRE Score for ongoing opioid management is calculated as follows:  Diagnosis = 2 pts (slowly progressive; moderate pain/objective findings)  Intractability = 3 pts (patient fully engaged but inadequate response to treatments)  Risk   Psych = 3 pts (no significant personality dysfunction/mental illness; good communication with clinic)   Chem Hlth = 3 pts (no history of chemical dependency; not drug-focused)  Reliability = 3 pts (highly reliable with meds, appointments, treatments)  Social = 3 pts (supportive family/close relationships; involved in work/school; no isolation)  (Psych + Chem hlth + Reliability + Social) = 17  Efficacy = 2 pts (moderate benefit/function; low med dose; too early/not tried meds)  DIRE Score = 19   7-13: likely NOT suitable candidate for long-term opioid analgesia  14-21: may be a suitable candidate for long-term opioid analgesia      DIAGNOSTIC TESTS:  Imaging Studies:   No new imaging      Assessment:   1. CRPS affecting the right upper extremity  2. Ganglion cyst, left wrist.   3. Depression, adjustment disorder    Diana presents in the company of [employer] rep Jesusita. Overall, Diana feels that her pain and function may be a bit better since last visit about 2 months ago. She has completed occupational therapy and continues to do HEP daily. She notes a significant pain flare following last stellate ganglion block however since that settled down she is  doing better. She is not taking Norco at all. She is trying to use her right UE more but is being careful not to overdo and cause a flare. She has another SGB scheduled on 7/11/17. I have advised that she keep this appt and determine a few days prior if she wants to proceed.     I would like her to contact [facility] regarding their CRPS treatment program. I believe that they have an intensive inpatient treatment program some success in treating CRPS. While I am not sure on the specifics of that program, I would like her to apply and see if this is something that may offer more treatment options than those we have been pursuing.     Plan:    Diagnosis reviewed, treatment option addressed, and risk/benifits discussed.  Self-care instructions given.  I am recommending a multidisciplinary treatment plan to help this patient better manage pain.      1. Schedule follow-up with SELWYN Ferguson, NPTalaC in 8 weeks  2. Medication recommendations: No change to current medications  3. Apply to pain management program at [facility].     Total time spent face to face was 30 minutes and more than 50% of face to face time was spent in counseling and/or coordination of care regarding the diagnosis and recommendations above.      SELWYN Romo, NP-C   [facility]

## 2017-06-28 NOTE — PATIENT INSTRUCTIONS
After Visit Instructions:     Thank you for coming to [facility] for your care. It is my goal to partner with you to help you reach your optimal state of health.     I am recommending multidisciplinary care at this time.  The focus of care will be to continue gradual rehabilitation and pain management with medication adjustments as needed.    Continue daily self-care, identifying contributing factors, and monitoring variations in pain level. Continue to integrate self-care into your life.      1. Schedule follow-up with SELWYN Ferguson NP-C in 8 weeks  2. Medication recommendations: No change to current medications  3. Apply to pain management program at [facility].     SELWYN Romo, NP-C  [facility]    Contact information: [facility]    Please call if any side effects, questions, or concerns arise.    Nurse Triage line:  [phone]   Call this number with any questions or concerns. You may leave a detailed message anytime. Calls are typically returned Monday through Friday between 8 AM and 4:30 PM. We usually get back to you within 2 business days depending on the issue/request.    Scheduling number: [phone].  Call this number to schedule or change appointments.          Medication Refills Policy:    For non-narcotic medications, please your pharmacy directly to request a refill and the pharmacy will call the [facility] for authorization. Please allow 3-4 days for these refills.    For narcotic refills, call the nurse triage line and leave a message requesting your refill along with the name of the pharmacy that you use. Narcotic prescriptions will be mailed to your pharmacy or you may pick them up at the clinic.  Please call 7-10 days before your refill is due  The above policy allows adequate time so that you do not run out of medication.    No Show - Late Cancellation - Late Arrival Policy  We  believe regular attendance is key to your success in our program.    Any time you are unable to keep your appointment we ask that you call us at  least 24 hours in advance to let us know. This will allow us to offer the appointment time to another patient. The following is our policy for missed appointments. This also applies to appointments cancelled with less than 24 hours notice.    You will receive a letter after missing your 1st and 2nd appointments without contacting the clinic before your scheduled appointment time.     After missing 3 appointments without calling first, we will cancel all of your future appointments at [facility].    At that point, you will not be able to resume services unless approved by your care team  We understand that unforseen circumstances arise, however, out of respect for all concerned and to provide this appointment to another patient, this policy will be enforced.    Please note that most follow up appointments are 30 minutes long. If you arrive late, your provider may not be able to see you for the entire 30 minutes. Please also note that if you arrive more than 15 minutes for any appointment, it may be rescheduled.

## 2017-06-28 NOTE — NURSING NOTE
"Chief Complaint   Patient presents with     Pain       Initial /85 (BP Location: Left arm, Patient Position: Chair, Cuff Size: Adult Regular)  Pulse 82 Estimated body mass index is 25.84 kg/(m^2) as calculated from the following:    Height as of 4/19/17: 1.753 m (5' 9\").    Weight as of 4/26/17: 79.4 kg (175 lb).  Medication Reconciliation: complete       Layne Butler MA  [facility]      "

## 2017-06-28 NOTE — MR AVS SNAPSHOT
After Visit Summary   6/28/2017    Diana Buenrostro    MRN: [id number]           Patient Information     Date Of Birth          1955        Visit Information        Provider Department      6/28/2017 11:30 AM Mallory Mayo APRN CNP [facility]        Care Instructions    After Visit Instructions:     Thank you for coming to [facility] for your care. It is my goal to partner with you to help you reach your optimal state of health.     I am recommending multidisciplinary care at this time.  The focus of care will be to continue gradual rehabilitation and pain management with medication adjustments as needed.    Continue daily self-care, identifying contributing factors, and monitoring variations in pain level. Continue to integrate self-care into your life.      1. Schedule follow-up with SELWYN Ferguson, NPMIKE in 8 weeks  2. Medication recommendations: No change to current medications  3. Apply to pain management program at [facility].     SELWYN Romo NP-C  [facility]    Contact information: [facility]    Please call if any side effects, questions, or concerns arise.    Nurse Triage line:  [phone]   Call this number with any questions or concerns. You may leave a detailed message anytime. Calls are typically returned Monday through Friday between 8 AM and 4:30 PM. We usually get back to you within 2 business days depending on the issue/request.    Scheduling number: [phone].  Call this number to schedule or change appointments.          Medication Refills Policy:    For non-narcotic medications, please your pharmacy directly to request a refill and the pharmacy will call the [facility] for authorization. Please allow 3-4 days for these refills.    For narcotic refills, call the nurse triage line and leave a message requesting your refill along with the  name of the pharmacy that you use. Narcotic prescriptions will be mailed to your pharmacy or you may pick them up at the clinic.  Please call 7-10 days before your refill is due  The above policy allows adequate time so that you do not run out of medication.    No Show - Late Cancellation - Late Arrival Policy  We believe regular attendance is key to your success in our program.    Any time you are unable to keep your appointment we ask that you call us at  least 24 hours in advance to let us know. This will allow us to offer the appointment time to another patient. The following is our policy for missed appointments. This also applies to appointments cancelled with less than 24 hours notice.    You will receive a letter after missing your 1st and 2nd appointments without contacting the clinic before your scheduled appointment time.     After missing 3 appointments without calling first, we will cancel all of your future appointments at [facility].    At that point, you will not be able to resume services unless approved by your care team  We understand that unforseen circumstances arise, however, out of respect for all concerned and to provide this appointment to another patient, this policy will be enforced.    Please note that most follow up appointments are 30 minutes long. If you arrive late, your provider may not be able to see you for the entire 30 minutes. Please also note that if you arrive more than 15 minutes for any appointment, it may be rescheduled.                                     Follow-ups after your visit        Your next 10 appointments already scheduled     Jul 11, 2017  8:45 AM CDT   Radiology Injections with Gaurang Pena MD   [facility] [place] ([facility] [place])    [address]  [place] [id number] [phone]              Who to contact     If you have questions or need follow up information about today's clinic visit or  your schedule please contact [facility] directly at [phone].  Normal or non-critical lab and imaging results will be communicated to you by MyChart, letter or phone within 4 business days after the clinic has received the results. If you do not hear from us within 7 days, please contact the clinic through MyChart or phone. If you have a critical or abnormal lab result, we will notify you by phone as soon as possible.  Submit refill requests through [employer] or call your pharmacy and they will forward the refill request to us. Please allow 3 business days for your refill to be completed.          Additional Information About Your Visit        [employer] Information     [employer] gives you secure access to your electronic health record. If you see a primary care provider, you can also send messages to your care team and make appointments. If you have questions, please call your primary care clinic.  If you do not have a primary care provider, please call [phone] and they will assist you.        Care EveryWhere ID     This is your Care EveryWhere ID. This could be used by other organizations to access your [facility] medical records  [id number]        Your Vitals Were     Pulse                   82            Blood Pressure from Last 3 Encounters:   06/28/17 137/85   05/23/17 129/84   04/26/17 125/83    Weight from Last 3 Encounters:   04/26/17 79.4 kg (175 lb)   04/19/17 78.7 kg (173 lb 6.4 oz)   02/28/17 77.7 kg (171 lb 6.4 oz)              Today, you had the following     No orders found for display       Primary Care Provider Office Phone # Fax #    Samantha SELWYN Roldan [phone]       [facility] [address] [address]        Equal Access to Services     SAMARA JUSTICE : Christine Hampton, mahin cox, chacha gonzalez. So [facility] [phone].    ATENCIÓN: Si theron parker  disposición servicios gratuitos de asistencia lingüística. Nikia mcclain [phone].    We comply with applicable federal civil rights laws and [place] laws. We do not discriminate on the basis of race, color, national origin, age, disability sex, sexual orientation or gender identity.            Thank you!     Thank you for choosing [facility]  for your care. Our goal is always to provide you with excellent care. Hearing back from our patients is one way we can continue to improve our services. Please take a few minutes to complete the written survey that you may receive in the mail after your visit with us. Thank you!             Your Updated Medication List - Protect others around you: Learn how to safely use, store and throw away your medicines at www.disposemymeds.org.          This list is accurate as of: 6/28/17 11:53 AM.  Always use your most recent med list.                   Brand Name Dispense Instructions for use Diagnosis    diclofenac 1 % Gel topical gel    VOLTAREN    100 g    Apply 2-4 grams to affected area four times daily    Complex regional pain syndrome type 2 of right upper extremity       DULoxetine 20 MG EC capsule    CYMBALTA    60 capsule    Take 1 capsule (20 mg) by mouth 2 times daily    Major depressive disorder, single episode, mild (H), YELENA (generalized anxiety disorder)       gabapentin 300 MG capsule    NEURONTIN    180 capsule    600 mg TID    Complex regional pain syndrome type 2 of right lower extremity       HYDROcodone-acetaminophen 5-325 MG per tablet    NORCO    20 tablet    1/2 to 1 tablet daily as needed for moderate to severe pain. #20 tabs to last 30 days.    Complex regional pain syndrome type 1 of right upper extremity       levothyroxine 50 MCG tablet    SYNTHROID/LEVOTHROID    30 tablet    Take 50 mcg on even days, and 25 mcg (1/2 tab) on odd days.    Hypothyroidism, unspecified type       lidocaine 5 % ointment    XYLOCAINE    100 g    Apply  topically 4 times daily as needed for moderate pain    Complex regional pain syndrome type 2 of right upper extremity       methocarbamol 500 MG tablet    ROBAXIN    120 tablet    Take 1-2 tablets (500-1,000 mg) by mouth 4 times daily as needed for muscle spasms    Muscle spasm       simvastatin 20 MG tablet    ZOCOR    30 tablet    Take 1 tablet (20 mg) by mouth At Bedtime    Hyperlipidemia LDL goal <100       VITAMIN C PO      Take 500 mg by mouth daily

## 2017-07-10 ENCOUNTER — MYC MEDICAL ADVICE (OUTPATIENT)
Dept: PALLIATIVE MEDICINE | Facility: CLINIC | Age: 62
End: 2017-07-10

## 2017-07-19 ENCOUNTER — TELEPHONE (OUTPATIENT)
Dept: PALLIATIVE MEDICINE | Facility: CLINIC | Age: 62
End: 2017-07-19

## 2017-07-19 ENCOUNTER — MYC MEDICAL ADVICE (OUTPATIENT)
Dept: PALLIATIVE MEDICINE | Facility: CLINIC | Age: 62
End: 2017-07-19

## 2017-07-24 ENCOUNTER — TELEPHONE (OUTPATIENT)
Dept: PALLIATIVE MEDICINE | Facility: CLINIC | Age: 62
End: 2017-07-24

## 2017-07-24 DIAGNOSIS — G90.511 COMPLEX REGIONAL PAIN SYNDROME TYPE 1 OF RIGHT UPPER EXTREMITY: Primary | ICD-10-CM

## 2017-07-24 NOTE — TELEPHONE ENCOUNTER
Vm left at 3:34pm:    Pt is requesting a referral to [facility]. Their fax number is [phone]. Please adv

## 2017-07-25 NOTE — TELEPHONE ENCOUNTER
Chart review shows OV recommendation to apply to the pain management program at [facility]. Patient sent the fax number for the referral below.    Routing to provider for review/recommendations.    Juliette Lawrence, MSN, RN-BC  Care Coordinator  [facility]

## 2017-07-26 NOTE — TELEPHONE ENCOUNTER
Printed order and asked the  staff to fax to [place].     ANGEL LawrenceN, RN-BC  Patient Care Supervisor/Care Coordinator  [facility]

## 2017-07-26 NOTE — TELEPHONE ENCOUNTER
"Order written as \"Other speciality referral\". Please fax as requested.     SELWYN Romo, NP-C  [facility]    "

## 2017-07-26 NOTE — TELEPHONE ENCOUNTER
Patient facesheet and the [facility]'s pain management program order was faxed to [phone] (fax number previously mentioned) and confirmation status was successfully sent - at 3:08pm on 7-.    Kamille Garfield    [facility]

## 2017-07-28 ENCOUNTER — TELEPHONE (OUTPATIENT)
Dept: PALLIATIVE MEDICINE | Facility: CLINIC | Age: 62
End: 2017-07-28

## 2017-07-28 NOTE — TELEPHONE ENCOUNTER
Received fax from [facility] in follow up to the referral that was sent for the [facility], pain management program.  The need additional medical information to process the request.  Need to call [phone] Mon-Friday between 8 am and 4:30 pm in order to complete the referral.     Complex regional pain syndrome type 1 of right upper extremity [G90.511]  - Primary   ---------  Called [place] and spoke with Rebecca. Verified that the pt is Work Comp and she will connect us to their Work Comp department to make sure that they have the needed information to move forward with the referral. They have already talked with the patient and obtained needed information.  [facility] department is working to connect with pt's  to get authorization for their services. They have everything they need right now.     Will route to provider as ELIESER.     Traci Orona, BSN, RN-BC  Patient Care Supervisor/Care Coordinator  [facility]

## 2017-08-01 ENCOUNTER — OFFICE VISIT (OUTPATIENT)
Dept: FAMILY MEDICINE | Facility: CLINIC | Age: 62
End: 2017-08-01
Payer: COMMERCIAL

## 2017-08-01 VITALS
HEIGHT: 69 IN | SYSTOLIC BLOOD PRESSURE: 131 MMHG | DIASTOLIC BLOOD PRESSURE: 74 MMHG | BODY MASS INDEX: 24.73 KG/M2 | TEMPERATURE: 97.4 F | HEART RATE: 73 BPM | WEIGHT: 167 LBS

## 2017-08-01 DIAGNOSIS — F32.5 MAJOR DEPRESSION IN COMPLETE REMISSION (H): ICD-10-CM

## 2017-08-01 DIAGNOSIS — F41.1 GAD (GENERALIZED ANXIETY DISORDER): ICD-10-CM

## 2017-08-01 DIAGNOSIS — L29.9 LOCALIZED PRURITUS: Primary | ICD-10-CM

## 2017-08-01 PROCEDURE — 99214 OFFICE O/P EST MOD 30 MIN: CPT | Performed by: NURSE PRACTITIONER

## 2017-08-01 RX ORDER — DULOXETIN HYDROCHLORIDE 20 MG/1
20 CAPSULE, DELAYED RELEASE ORAL DAILY
Qty: 30 CAPSULE | Refills: 3 | Status: SHIPPED | OUTPATIENT
Start: 2017-08-01 | End: 2017-11-09

## 2017-08-01 RX ORDER — DULOXETIN HYDROCHLORIDE 20 MG/1
20 CAPSULE, DELAYED RELEASE ORAL 2 TIMES DAILY
Qty: 180 CAPSULE | Refills: 3 | Status: SHIPPED | OUTPATIENT
Start: 2017-08-01 | End: 2017-08-01

## 2017-08-01 RX ORDER — HYDROXYZINE HYDROCHLORIDE 25 MG/1
25-50 TABLET, FILM COATED ORAL EVERY 6 HOURS PRN
Qty: 60 TABLET | Refills: 1 | Status: SHIPPED | OUTPATIENT
Start: 2017-08-01 | End: 2018-05-23

## 2017-08-01 ASSESSMENT — ANXIETY QUESTIONNAIRES
2. NOT BEING ABLE TO STOP OR CONTROL WORRYING: NOT AT ALL
3. WORRYING TOO MUCH ABOUT DIFFERENT THINGS: NOT AT ALL
6. BECOMING EASILY ANNOYED OR IRRITABLE: NOT AT ALL
1. FEELING NERVOUS, ANXIOUS, OR ON EDGE: NOT AT ALL
7. FEELING AFRAID AS IF SOMETHING AWFUL MIGHT HAPPEN: NOT AT ALL
1. FEELING NERVOUS, ANXIOUS, OR ON EDGE: NOT AT ALL
GAD7 TOTAL SCORE: 0
5. BEING SO RESTLESS THAT IT IS HARD TO SIT STILL: NOT AT ALL
GAD7 TOTAL SCORE: 0
6. BECOMING EASILY ANNOYED OR IRRITABLE: NOT AT ALL
2. NOT BEING ABLE TO STOP OR CONTROL WORRYING: NOT AT ALL
7. FEELING AFRAID AS IF SOMETHING AWFUL MIGHT HAPPEN: NOT AT ALL
3. WORRYING TOO MUCH ABOUT DIFFERENT THINGS: NOT AT ALL
5. BEING SO RESTLESS THAT IT IS HARD TO SIT STILL: NOT AT ALL

## 2017-08-01 ASSESSMENT — PATIENT HEALTH QUESTIONNAIRE - PHQ9
5. POOR APPETITE OR OVEREATING: NOT AT ALL
5. POOR APPETITE OR OVEREATING: NOT AT ALL

## 2017-08-01 NOTE — MR AVS SNAPSHOT
After Visit Summary   8/1/2017    Diana Buenrostro    MRN: [id number]           Patient Information     Date Of Birth          1955        Visit Information        Provider Department      8/1/2017 1:20 PM Samantha Dhaliwal APRN CNP [facility]        Today's Diagnoses     Localized pruritus    -  1    YELENA (generalized anxiety disorder)        Major depression in complete remission (H)          Care Instructions    1. Schedule an appointment with Allergy clinic  2. Take Hydroxyzine as needed   3. Schedule appointment with dermatology       Thank you for choosing [facility].  You may be receiving a survey in the mail from [facility] regarding your visit today.  Please take a few minutes to complete and return the survey to let us know how we are doing.      If you have questions or concerns, please contact us via Algolux or you can contact your care team at [phone].    Our Clinic hours are:  Monday 6:40 am  to 7:00 pm  Tuesday -Friday 6:40 am to 5:00 pm    The [place] outpatient lab hours are:  Monday - Friday 6:10 am to 4:45 pm  Saturdays 7:00 am to 11:00 am  Appointments are required, call [phone]    If you have clinical questions after hours or would like to schedule an appointment,  call the clinic at [phone].            Follow-ups after your visit        Additional Services     ALLERGY/ASTHMA ADULT REFERRAL       Your provider has referred you to: GENG: [facility] [phone] [email or web address]    Please be aware that coverage of these services is subject to the terms and limitations of your health insurance plan.  Call member services at your health plan with any benefit or coverage questions.      Please bring the following with you to your appointment:    (1) Any X-Rays, CTs or MRIs which have been performed.  Contact the facility where they were done to arrange for  prior to your scheduled appointment.     (2) List of current medications  (3) This referral request   (4) Any documents/labs given to you for this referral            DERMATOLOGY REFERRAL       Your provider has referred you to: FMG: [facility] [phone]   [email or web address]    Please be aware that coverage of these services is subject to the terms and limitations of your health insurance plan.  Call member services at your health plan with any benefit or coverage questions.      Please bring the following with you to your appointment:    (1) Any X-Rays, CTs or MRIs which have been performed.  Contact the facility where they were done to arrange for  prior to your scheduled appointment.    (2) List of current medications  (3) This referral request   (4) Any documents/labs given to you for this referral                  Your next 10 appointments already scheduled     Aug 24, 2017 11:00 AM CDT   Return Visit with SELWYN Gaona CNP   [facility] ([facility])    [address]  [address]  [facility] [address] [phone]              Who to contact     If you have questions or need follow up information about today's clinic visit or your schedule please contact [facility] directly at [phone].  Normal or non-critical lab and imaging results will be communicated to you by MyChart, letter or phone within 4 business days after the clinic has received the results. If you do not hear from us within 7 days, please contact the clinic through MyChart or phone. If you have a critical or abnormal lab result, we will notify you by phone as soon as possible.  Submit refill requests through Back& or call your pharmacy and they will forward the refill request to us. Please allow 3 business days for your refill to be completed.          Additional Information About Your Visit        Back& Information     Back& gives you secure access to  "your electronic health record. If you see a primary care provider, you can also send messages to your care team and make appointments. If you have questions, please call your primary care clinic.  If you do not have a primary care provider, please call [phone] and they will assist you.        Care EveryWhere ID     This is your Care EveryWhere ID. This could be used by other organizations to access your [place] medical records  [id number]        Your Vitals Were     Pulse Temperature Height BMI (Body Mass Index)          73 97.4  F (36.3  C) (Tympanic) 5' 9\" (1.753 m) 24.66 kg/m2         Blood Pressure from Last 3 Encounters:   08/01/17 131/74   06/28/17 137/85   05/23/17 129/84    Weight from Last 3 Encounters:   08/01/17 167 lb (75.8 kg)   04/26/17 175 lb (79.4 kg)   04/19/17 173 lb 6.4 oz (78.7 kg)              We Performed the Following     ALLERGY/ASTHMA ADULT REFERRAL     DERMATOLOGY REFERRAL          Today's Medication Changes          These changes are accurate as of: 8/1/17  1:58 PM.  If you have any questions, ask your nurse or doctor.               Start taking these medicines.        Dose/Directions    DULoxetine 20 MG EC capsule   Commonly known as:  CYMBALTA   Used for:  YELENA (generalized anxiety disorder)        Dose:  20 mg   Take 1 capsule (20 mg) by mouth daily   Quantity:  30 capsule   Refills:  3       hydrOXYzine 25 MG tablet   Commonly known as:  ATARAX   Used for:  Localized pruritus        Dose:  25-50 mg   Take 1-2 tablets (25-50 mg) by mouth every 6 hours as needed for itching   Quantity:  60 tablet   Refills:  1            Where to get your medicines      These medications were sent to [facility] [place], [place] - [address]  [address] [address]     Phone:  [phone]     DULoxetine 20 MG EC capsule    hydrOXYzine 25 MG tablet                Primary Care Provider Office Phone # Fax #    SELWYN Cifuentes [phone]    "    [facility] [address] [address]        Equal Access to Services     SAMARA JUSTICE : Hadii niharika [place], wadennyda brooke, qatitus webstermajohnnie jimenez, chacha day. So [facility] [phone].    ATENCIÓN: Si habla español, tiene a de la torre disposición servicios gratuitos de asistencia lingüística. Llame al [phone].    We comply with applicable federal civil rights laws and [place] laws. We do not discriminate on the basis of race, color, national origin, age, disability sex, sexual orientation or gender identity.            Thank you!     Thank you for choosing [facility]  for your care. Our goal is always to provide you with excellent care. Hearing back from our patients is one way we can continue to improve our services. Please take a few minutes to complete the written survey that you may receive in the mail after your visit with us. Thank you!             Your Updated Medication List - Protect others around you: Learn how to safely use, store and throw away your medicines at www.disposemymeds.org.          This list is accurate as of: 8/1/17  1:58 PM.  Always use your most recent med list.                   Brand Name Dispense Instructions for use Diagnosis    diclofenac 1 % Gel topical gel    VOLTAREN    100 g    Apply 2-4 grams to affected area four times daily    Complex regional pain syndrome type 2 of right upper extremity       DULoxetine 20 MG EC capsule    CYMBALTA    30 capsule    Take 1 capsule (20 mg) by mouth daily    YELENA (generalized anxiety disorder)       gabapentin 300 MG capsule    NEURONTIN    180 capsule    600 mg TID    Complex regional pain syndrome type 2 of right lower extremity       hydrOXYzine 25 MG tablet    ATARAX    60 tablet    Take 1-2 tablets (25-50 mg) by mouth every 6 hours as needed for itching    Localized pruritus       levothyroxine 50 MCG tablet    SYNTHROID/LEVOTHROID    30 tablet    Take 50 mcg on  even days, and 25 mcg (1/2 tab) on odd days.    Hypothyroidism, unspecified type       lidocaine 5 % ointment    XYLOCAINE    100 g    Apply topically 4 times daily as needed for moderate pain    Complex regional pain syndrome type 2 of right upper extremity       simvastatin 20 MG tablet    ZOCOR    30 tablet    Take 1 tablet (20 mg) by mouth At Bedtime    Hyperlipidemia LDL goal <100       VITAMIN C PO      Take 500 mg by mouth daily

## 2017-08-01 NOTE — PATIENT INSTRUCTIONS
1. Schedule an appointment with Allergy clinic  2. Take Hydroxyzine as needed   3. Schedule appointment with dermatology       Thank you for choosing [facility].  You may be receiving a survey in the mail from [facility]oss regarding your visit today.  Please take a few minutes to complete and return the survey to let us know how we are doing.      If you have questions or concerns, please contact us via Popularo or you can contact your care team at [phone].    Our Clinic hours are:  Monday 6:40 am  to 7:00 pm  Tuesday -Friday 6:40 am to 5:00 pm    The [place] outpatient lab hours are:  Monday - Friday 6:10 am to 4:45 pm  Saturdays 7:00 am to 11:00 am  Appointments are required, call [phone]    If you have clinical questions after hours or would like to schedule an appointment,  call the clinic at [phone].

## 2017-08-01 NOTE — PROGRESS NOTES
"  SUBJECTIVE:                                                    Diana Buenrostro is a 61 year old female who presents to clinic today for the following health issues:      Concern - Chronic Itching  Onset 2-3 months.     Description:   Pt states that she has been having uncontrollable itching in both arms. She believes that it could be a side effect to the CRPS blockers from her right wrist.    Intensity: moderate    Progression of Symptoms:  worsening, But increasing in frequency.    Accompanying Signs & Symptoms:  Rough skin, Red bumps from scratching.    Previous history of similar problem:   None    Precipitating factors:   Worsened by: Ice, certain OTC creams cause burning    Alleviating factors:  Improved by: Cool water helps.    Therapies Tried and outcome: Creams, Cold.    Patient with history of CRPS- followed by pain clinic in [place]- thought that the \"itching \" was part of CRPS although she has tried different creams and it has not helped or made her symptoms worse. . Itching is worse at night. Tried Lidocaine, Sarna cream and Capsaicin cream which made it worse.   Unknown trigger.no rashes     Depression and anxiety: feels well controlled  PHQ-9 SCORE 5/16/2016 2/28/2017 8/1/2017   Total Score - - -   Total Score 3 9 3     YELENA-7 SCORE 2/28/2017 8/1/2017 8/1/2017   Total Score - - -   Total Score 5 0 0       -------------------------------------    Problem list and histories reviewed & adjusted, as indicated.  Additional history: as documented    Patient Active Problem List   Diagnosis     Kidney donor     Chronic kidney disease (CKD)     Pulmonary nodule     HYPERLIPIDEMIA LDL GOAL <100     Sarcoidosis (H)     Hypothyroidism     Advanced directives, counseling/discussion     Calculus of gallbladder with other cholecystitis, without mention of obstruction     Transient visual loss     Closed fracture of distal end of right radius     Complex regional pain syndrome type 2 of right upper extremity     " "Major depressive disorder, single episode, mild (H)     YELENA (generalized anxiety disorder)     Major depression in complete remission (H)     Past Surgical History:   Procedure Laterality Date     ARTHROSCOPY KNEE WITH MEDIAL MENISCECTOMY Right 1/22/2015    Procedure: ARTHROSCOPY KNEE WITH MEDIAL MENISCECTOMY;  Surgeon: Alberto Mason MD;  Location: [facility] OR     HYSTERECTOMY, VAGINAL  2000    Partial Hysterectomy for menorrhagia     LAPAROSCOPIC CHOLECYSTECTOMY  4/9/2014    Procedure: LAPAROSCOPIC CHOLECYSTECTOMY;  Laparoscopic vs Open Cholecystectomy;  Surgeon: Kamille Hilliard MD;  Location: [facility] OR     laparoscopic wedge resection of right lung  2010     SURGICAL HISTORY OF -   6/14/2006    Laparoscopic and hand-assisted left donor ureteronephrectomy       Social History   Substance Use Topics     Smoking status: Former Smoker     Years: 15.00     Quit date: 1/1/1995     Smokeless tobacco: Never Used     Alcohol use No     Family History   Problem Relation Age of Onset     DIABETES Mother      Hypertension Mother      Arthritis Mother      HEART DISEASE Mother      Lipids Mother      DIABETES Father      CANCER Father      DIABETES Sister      Genitourinary Problems Sister      kidney     HEART DISEASE Sister      Lipids Sister      CANCER Paternal Grandmother 40     melanoma     Family History Negative No family hx of              Reviewed and updated as needed this visit by clinical staff     Reviewed and updated as needed this visit by Provider         ROS:  Constitutional, HEENT, cardiovascular, pulmonary, GI, , musculoskeletal, neuro, skin, endocrine and psych systems are negative, except as otherwise noted.      OBJECTIVE:   /74 (BP Location: Left arm, Patient Position: Sitting, Cuff Size: Adult Small)  Pulse 73  Temp 97.4  F (36.3  C) (Tympanic)  Ht 5' 9\" (1.753 m)  Wt 167 lb (75.8 kg)  BMI 24.66 kg/m2  Body mass index is 24.66 kg/(m^2).  GENERAL: healthy, alert and no " distress  RESP: unlabored  MS: no gross musculoskeletal defects noted, no edema  SKIN: no suspicious lesions or rashes    Diagnostic Test Results:  none     ASSESSMENT/PLAN:       1. YELENA (generalized anxiety disorder)  stable  - Refilled DULoxetine (CYMBALTA) 20 MG EC capsule; Take 1 capsule (20 mg) by mouth daily  Dispense: 30 capsule; Refill: 3    2. Localized pruritus  Only occurring on upper extremities last couple of months without rash- no recent changes in diet or medications.   Unknown etiology- ? Allergic reaction   - hydrOXYzine (ATARAX) 25 MG tablet; Take 1-2 tablets (25-50 mg) by mouth every 6 hours as needed for itching  Dispense: 60 tablet; Refill: 1  - ALLERGY/ASTHMA ADULT REFERRAL  - DERMATOLOGY REFERRAL    3. Major depression in complete remission (H)  stable  - Refilled Duloxetine         SELWYN Cifuentes [facility]

## 2017-08-02 ASSESSMENT — ANXIETY QUESTIONNAIRES: GAD7 TOTAL SCORE: 0

## 2017-08-02 ASSESSMENT — PATIENT HEALTH QUESTIONNAIRE - PHQ9: SUM OF ALL RESPONSES TO PHQ QUESTIONS 1-9: 3

## 2017-08-07 ENCOUNTER — MYC MEDICAL ADVICE (OUTPATIENT)
Dept: PALLIATIVE MEDICINE | Facility: CLINIC | Age: 62
End: 2017-08-07

## 2017-08-08 NOTE — TELEPHONE ENCOUNTER
This is being managed in a separate encounter.    ANGEL HassanN, RN  Care Coordinator  [facility]

## 2017-08-08 NOTE — TELEPHONE ENCOUNTER
Aminta from patient Created: 8/7/2017 5:33 PM    Thank you Mallory for all your help.  In regards to the [facility]. They received a fax on July 24th that was the referral. They said they asked for more information and have not talked to anyone. Just called them again to see if I could answer questions and they said no. They said if your nurse would call this number [phone] and talk to anyone they could get the information. Thats all I know.  In regards to work and restrictions I of course don't want to do anything to early that would flare anything up. That is good advice.     Diana  -----------------------------------  [facility] patient ID number is [id number]    Called [facility] at the number above and they need the following information include with the referral.     Letter stating patients medical condition related to pain and including the duration of pain.  List of general treatment tried for pain.  Copies of any pain related medical information from the past 12 months.  Reports from all imaging and tests done within the past 12 months.  A list of current and past medications related to pain and a current medication list.  Injections notes and procedure reports.  A physical evaluation from last 3 months.    Fax to this number [phone]    RIGOBERTO Hassan, RN  Care Coordinator  [facility]

## 2017-08-16 ENCOUNTER — MYC MEDICAL ADVICE (OUTPATIENT)
Dept: PALLIATIVE MEDICINE | Facility: CLINIC | Age: 62
End: 2017-08-16

## 2017-08-21 NOTE — TELEPHONE ENCOUNTER
Chart check reveals that patient has appointment on 08/24 and has reviewed but not responded to mychart sent by provider.     Mary Mendez  BSN-RN Care Coordinator  [facility]

## 2017-08-22 NOTE — TELEPHONE ENCOUNTER
Called [facility] and they stated they have not recvd this info yet. Sending to [place] pool to pull all necessary info and fax over to [facility].     Jesusita Etienne RN, [facility]  Pain Clinic Care Coordinator

## 2017-08-24 ENCOUNTER — OFFICE VISIT (OUTPATIENT)
Dept: PALLIATIVE MEDICINE | Facility: CLINIC | Age: 62
End: 2017-08-24
Payer: OTHER MISCELLANEOUS

## 2017-08-24 VITALS
BODY MASS INDEX: 24.37 KG/M2 | SYSTOLIC BLOOD PRESSURE: 102 MMHG | RESPIRATION RATE: 16 BRPM | OXYGEN SATURATION: 97 % | DIASTOLIC BLOOD PRESSURE: 62 MMHG | WEIGHT: 165 LBS | HEART RATE: 85 BPM

## 2017-08-24 DIAGNOSIS — G90.511 COMPLEX REGIONAL PAIN SYNDROME TYPE 1 OF RIGHT UPPER EXTREMITY: Primary | ICD-10-CM

## 2017-08-24 PROCEDURE — 99214 OFFICE O/P EST MOD 30 MIN: CPT | Performed by: NURSE PRACTITIONER

## 2017-08-24 NOTE — PROGRESS NOTES
[facility]    CHIEF COMPLAINT: Pain in right wrist    INTERVAL HISTORY:  Last seen on 6/28/17.        Recommendations/plan at the last visit included:  1.                  Schedule follow-up with SELWYN Ferguson NP-C in 8 weeks  2.                  Medication recommendations:  No change to current medications  3.                  Apply to pain management program at [facility].      Since her last visit, Diana Buenrostro reports:  - Working on application to [place].   - Having a lot of itching on bilateral arms, varies from right to left. Color on right UE can change from red to bruised appearance. This happens more with use of RUE. Has tried multiple OTC itch creams. Most seem to make pain worse. Having a hard time sleeping.   - Derma E cream from [employer] is somewhat helpful.     Pain Information:   Pain quality: Aching, Burning, Exhausting, Numb, Tender and Tiring    Pain timing: Constant     Pain rating: intensity ranges from 3/10 to 8/10, and averages 5/10 on a 0-10 scale.   Aggravating factors include: use, itching   Relieving factors include: exercises, rest      Annual Controlled Substance Agreement/UDS due date: 10/2017      CURRENT RELEVANT PAIN MEDICATIONS:   Lidocaine 5% ointment  Diclofenac gel apply to affected area 3-4 times daily  Norco 5/325 mg: Half to 1 tablet daily as needed for severe pain. #20 tablets to last 30 days      Patient is using the medication as prescribed: YES  Is your medication helpful? YES, but doesn't last very long   Medication side effects? denies any problems          Previous Pain Relevant Medications: (H--helped; HI--Helped initially; SWH--Somewhat helpful; NH--No help; W--worse; SE--side effects; ?--Unsure if helpful)   NOTE: This medication information taken from patient's intake form, not medical records.   Opiates: Norco: H post op  NSAIDS: none: only has one kidney, donated kidney to her sister  Muscle Relaxants: none  Anti-migraine  mediations: none  Anti-depressants: none  Sleep aids:none  Anti-convulsants: none   Topicals: none  Other medications not covered above: Tylenol: [facility]      Any illicit drug use: no  Any use of prescriptions other than how they were prescribed:no          [place] Board of Pharmacy Data Base Reviewed: YES; No concern for abuse or misuse of controlled medications based on this report.      SELF CARE:   How often do you practice SELF-CARE (relaxing, stretching, pacing, monitoring posture, taking mini-breaks) in a typical day:  Few times per day  Medications:  Current Outpatient Prescriptions   Medication Sig Dispense Refill     hydrOXYzine (ATARAX) 25 MG tablet Take 1-2 tablets (25-50 mg) by mouth every 6 hours as needed for itching 60 tablet 1     DULoxetine (CYMBALTA) 20 MG EC capsule Take 1 capsule (20 mg) by mouth daily 30 capsule 3     gabapentin (NEURONTIN) 300 MG capsule 600 mg  capsule 3     levothyroxine (SYNTHROID/LEVOTHROID) 50 MCG tablet Take 50 mcg on even days, and 25 mcg (1/2 tab) on odd days. 30 tablet 11     simvastatin (ZOCOR) 20 MG tablet Take 1 tablet (20 mg) by mouth At Bedtime 30 tablet 11     diclofenac (VOLTAREN) 1 % GEL Apply 2-4 grams to affected area four times daily 100 g 1     lidocaine (XYLOCAINE) 5 % ointment Apply topically 4 times daily as needed for moderate pain 100 g 1     Ascorbic Acid (VITAMIN C PO) Take 500 mg by mouth daily         Review of Systems: A 10-point review of systems was negative, with the exception of chronic pain issues.      Social History: Reviewed; unchanged from initial consultation.      Family history: Reviewed; unchanged from initial consultation.     PHYSICAL EXAM    Vitals:    08/24/17 1054   BP: 102/62   BP Location: Left arm   Patient Position: Chair   Cuff Size: Adult Regular   Pulse: 85   Resp: 16   SpO2: 97%   Weight: 74.8 kg (165 lb)       Constitutional: healthy, alert and no distress  HEENT: Head atraumatic, normocephalic. Eyes without  conjunctival injection or jaundice. Neck supple. No obvious neck masses.  Skin: No rash, lesions, or petechiae of exposed skin.   Extremities: Peripheral pulses intact. No clubbing, cyanosis, or edema. Affected extremity is cooler than opposite extremity. No coloration difference today. Diminished strength.  Psychiatric/mental status: Alert, without lethargy or stupor. Appropriate affect. Mood anxious, tearful at times  Neurologic exam:  CN: Cranial nerves 2-12 are grossly normal.  Motor:  3/5 RUE, 5/5 LUE      Sensory exam:  Light touch: Painful in right upper extremity   RUE allodynia    [facility] Criteria for diagnosis of Complex Regional Pain Syndrome:    Continuing pain disproportionate to inciting event: positive  Must report one symptom (subjective) in two of the following four categories:   Sensory (hyperalgesia or allodynia): positive for hyperalgesia, allodynia   Vasomotor (temperature asymmetry, skin color changes, skin color asymmetry): positive for temperature asymmetry, skin color cahange and assymetry   Pseudomotor/edema (edema, sweating or sweating asymmetry): negative   Motor/trophic (decreased range of motion, motor dysfunction, trophic changes in skin, hair or nails): positive for decreased ROM, motor dysfunction, changes in skin  Must display (objective) one sign in two or more of the following four categories:   Sensory (hyperalgesia or allodynia): positive for Hyperalgesia and allodynia   Vasomotor (temperature asymmetry, skin color changes, skin color asymmetry): positive for skin color changes, skin color asymmetery   Pseudomotor/edema (edema, sweating or sweating asymmetry): negative   Motor/trophic (decreased range of motion, motor dysfunction, trophic changes in skin, hair or nails): positive for decreased ROM, motor dysfuntion, trophic changes in skin   Patient does meet criteria for Complex Regional Pain Syndrome        DIRE Score for ongoing opioid management is calculated as  follows:  Diagnosis = 2 pts (slowly progressive; moderate pain/objective findings)  Intractability = 3 pts (patient fully engaged but inadequate response to treatments)  Risk   Psych = 3 pts (no significant personality dysfunction/mental illness; good communication with clinic)   Chem Hlth = 3 pts (no history of chemical dependency; not drug-focused)  Reliability = 3 pts (highly reliable with meds, appointments, treatments)  Social = 3 pts (supportive family/close relationships; involved in work/school; no isolation)  (Psych + Chem hlth + Reliability + Social) = 17  Efficacy = 2 pts (moderate benefit/function; low med dose; too early/not tried meds)  DIRE Score = 19   7-13: likely NOT suitable candidate for long-term opioid analgesia  14-21: may be a suitable candidate for long-term opioid analgesia      DIAGNOSTIC TESTS:  Imaging Studies:   No new imaging      Assessment:   1. CRPS affecting the right upper extremity  2. Ganglion cyst, left wrist.   3. Depression, adjustment disorder    Diana presents in the company of her [facility] rep, Jesusita. She continues to have significant pain and disability with RUE. She also has pain and itch in the LUE. I am concerned about laterality transfer of symptoms but she also has a ganglion cyst which explains some of her pain. She is wondering about repeating stellate ganglion blocks. While they have provided some temporary relief and increase ROM, if doesn't last long. I am recommending that she wait and discuss ongoing treatment options in her consultation with [facility].      Plan:    Diagnosis reviewed, treatment option addressed, and risk/benifits discussed.  Self-care instructions given.  I am recommending a multidisciplinary treatment plan to help this patient better manage pain.        1. Schedule follow-up with SELWYN Ferguson NP-C in 8 weeks, or two weeks after [facility] records.   2. Medication recommendations: No change to current recommendations.       Total time spent face  to face was 30 minutes and more than 50% of face to face time was spent in counseling and/or coordination of care regarding the diagnosis and recommendations above.      SELWYN Romo, NP-C   [facility]

## 2017-08-24 NOTE — NURSING NOTE
"Chief Complaint   Patient presents with     Pain       Initial /62 (BP Location: Left arm, Patient Position: Chair, Cuff Size: Adult Regular)  Pulse 85  Resp 16  Wt 74.8 kg (165 lb)  SpO2 97%  BMI 24.37 kg/m2 Estimated body mass index is 24.37 kg/(m^2) as calculated from the following:    Height as of 8/1/17: 1.753 m (5' 9\").    Weight as of this encounter: 74.8 kg (165 lb).  Medication Reconciliation: complete       Layne Butler MA  [facility]      "

## 2017-08-24 NOTE — LETTER
Re: Diana Buenrostro  [address] [facility] [address]      August 29, 2017    To whom it may concern:     Diana Buenrostro has been my patient since May 16, 2016 for pain related to Complex Regional Pain Syndrome Type 1 following a fracture of her right wrist. The original injury was sustained in a fall on the ice on 12/8/15.     She has participated in extensive occupational therapy and had had multiple stellate ganglion blocks. She has tried neuropathic medications, opiate pain medication, and muscle relaxers. None of these treatments have provided long lasting relief.     I am referring her to the [facility] to consider any additional therapies and treatments available.     Sincerely,           SELWYN Romo, NP-C  [facility]

## 2017-08-24 NOTE — LETTER
PATIENT DATA           REPORT OF WORK ABILITY  [facility]  [address]  [address]  [facility] [address] [phone]  PATIENT DATA      Employee Name: Diana Buenrostro      : 1955      SS#: [id number]      Work related injury: Yes  Employer at time of injury: [employer]  Employer contact & phone: [phone]  Employed elsewhere? No  Workers' Compensation Carrier/Managed Care Plan:  [facility] [employer]      Today's date: 17  Date of injury: 12/8/15  Date of first visit: 16      PROVIDER EVALUATION: Please fill in as needed.  Please give copy to employee for employer.  1. Diagnosis: Complex Regional Pain Syndrome affecting right upper extremity  2. Treatment: Medication, nerve blocks (patient will call to discuss in 2 weeks),  OT/PT.  3. Medication: Gabapentin, lidoderm patches PRN, diclofenac gel PRN, Norco PRN, Methocarbamol PRN      NOTE: When ordering a medication, [place] Rules require Work Comp or WC on prescriptions.  4.  Hand therpay is completed. Continue daily exercises  5. Return to work date: Now:       RESTRICTIONS: 10 lb weight restriction, no push/pull, max 4 hrs per day, 3 days per week. No consecutive work days. Job search restricted to above limitations.  Restrictions to stay in place until otherwise noted.       Maximum Medical Improvement (Date): Unknown  Any Permanent Partial Disability? Deferred to future exam/consult.          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

## 2017-08-24 NOTE — PATIENT INSTRUCTIONS
After Visit Instructions:     Thank you for coming to [facility] for your care. It is my goal to partner with you to help you reach your optimal state of health.     I am recommending multidisciplinary care at this time.  The focus of care will be to continue gradual rehabilitation and pain management with medication adjustments as needed.    Continue daily self-care, identifying contributing factors, and monitoring variations in pain level. Continue to integrate self-care into your life.      1. Schedule follow-up with SELWYN Ferguson NP-C in 8 weeks, or two weeks after [facility].   2. Medication recommendations: No change to current recommendations.     SELWYN Romo, NP-C  [facility]    Contact information: [facility]    Please call if any side effects, questions, or concerns arise.    Nurse Triage line:  [phone]   Call this number with any questions or concerns. You may leave a detailed message anytime. Calls are typically returned Monday through Friday between 8 AM and 4:30 PM. We usually get back to you within 2 business days depending on the issue/request.    Scheduling number: [phone].  Call this number to schedule or change appointments.          Medication Refills Policy:    For non-narcotic medications, please your pharmacy directly to request a refill and the pharmacy will call the [facility] for authorization. Please allow 3-4 days for these refills.    For narcotic refills, call the nurse triage line and leave a message requesting your refill along with the name of the pharmacy that you use. Narcotic prescriptions will be mailed to your pharmacy or you may pick them up at the clinic.  Please call 7-10 days before your refill is due  The above policy allows adequate time so that you do not run out of medication.    No Show - Late Cancellation - Late Arrival Policy  We believe regular  attendance is key to your success in our program.    Any time you are unable to keep your appointment we ask that you call us at  least 24 hours in advance to let us know. This will allow us to offer the appointment time to another patient. The following is our policy for missed appointments. This also applies to appointments cancelled with less than 24 hours notice.    You will receive a letter after missing your 1st and 2nd appointments without contacting the clinic before your scheduled appointment time.     After missing 3 appointments without calling first, we will cancel all of your future appointments at [facility].    At that point, you will not be able to resume services unless approved by your care team  We understand that unforseen circumstances arise, however, out of respect for all concerned and to provide this appointment to another patient, this policy will be enforced.    Please note that most follow up appointments are 30 minutes long. If you arrive late, your provider may not be able to see you for the entire 30 minutes. Please also note that if you arrive more than 15 minutes for any appointment, it may be rescheduled.

## 2017-08-24 NOTE — MR AVS SNAPSHOT
After Visit Summary   8/24/2017    Diana Buenrostro    MRN: [id number]           Patient Information     Date Of Birth          1955        Visit Information        Provider Department      8/24/2017 11:00 AM Mallory Mayo APRN CNP [facility]        Care Instructions    After Visit Instructions:     Thank you for coming to [facility] for your care. It is my goal to partner with you to help you reach your optimal state of health.     I am recommending multidisciplinary care at this time.  The focus of care will be to continue gradual rehabilitation and pain management with medication adjustments as needed.    Continue daily self-care, identifying contributing factors, and monitoring variations in pain level. Continue to integrate self-care into your life.      1. Schedule follow-up with SELWYN Ferguson, NP-C in 8 weeks, or two weeks after [facility].   2. Medication recommendations: No change to current recommendations.     SELWYN Romo NP-C  [facility]    Contact information: [facility]    Please call if any side effects, questions, or concerns arise.    Nurse Triage line:  [phone]   Call this number with any questions or concerns. You may leave a detailed message anytime. Calls are typically returned Monday through Friday between 8 AM and 4:30 PM. We usually get back to you within 2 business days depending on the issue/request.    Scheduling number: [phone].  Call this number to schedule or change appointments.          Medication Refills Policy:    For non-narcotic medications, please your pharmacy directly to request a refill and the pharmacy will call the [facility] for authorization. Please allow 3-4 days for these refills.    For narcotic refills, call the nurse triage line and leave a message requesting your refill along with the name of the  pharmacy that you use. Narcotic prescriptions will be mailed to your pharmacy or you may pick them up at the clinic.  Please call 7-10 days before your refill is due  The above policy allows adequate time so that you do not run out of medication.    No Show - Late Cancellation - Late Arrival Policy  We believe regular attendance is key to your success in our program.    Any time you are unable to keep your appointment we ask that you call us at  least 24 hours in advance to let us know. This will allow us to offer the appointment time to another patient. The following is our policy for missed appointments. This also applies to appointments cancelled with less than 24 hours notice.    You will receive a letter after missing your 1st and 2nd appointments without contacting the clinic before your scheduled appointment time.     After missing 3 appointments without calling first, we will cancel all of your future appointments at [facility].    At that point, you will not be able to resume services unless approved by your care team  We understand that unforseen circumstances arise, however, out of respect for all concerned and to provide this appointment to another patient, this policy will be enforced.    Please note that most follow up appointments are 30 minutes long. If you arrive late, your provider may not be able to see you for the entire 30 minutes. Please also note that if you arrive more than 15 minutes for any appointment, it may be rescheduled.                                     Follow-ups after your visit        Your next 10 appointments already scheduled     Sep 20, 2017 10:00 AM CDT   New Visit with Margarita Sofia PA-C   [facility]    [address] [facility] [address] [facility] [id number] [phone]              Who to contact     If you have questions or need follow up information about today's clinic visit or your schedule please contact  [facility] directly at [phone].  Normal or non-critical lab and imaging results will be communicated to you by MyChart, letter or phone within 4 business days after the clinic has received the results. If you do not hear from us within 7 days, please contact the clinic through [facility] or phone. If you have a critical or abnormal lab result, we will notify you by phone as soon as possible.  Submit refill requests through [facility] or call your pharmacy and they will forward the refill request to us. Please allow 3 business days for your refill to be completed.          Additional Information About Your Visit        [facility] Information     [facility] gives you secure access to your electronic health record. If you see a primary care provider, you can also send messages to your care team and make appointments. If you have questions, please call your primary care clinic.  If you do not have a primary care provider, please call [phone] and they will assist you.        Care EveryWhere ID     This is your Care EveryWhere ID. This could be used by other organizations to access your [facility] medical records  [id number]        Your Vitals Were     Pulse Respirations Pulse Oximetry BMI (Body Mass Index)          85 16 97% 24.37 kg/m2         Blood Pressure from Last 3 Encounters:   08/24/17 102/62   08/01/17 131/74   06/28/17 137/85    Weight from Last 3 Encounters:   08/24/17 74.8 kg (165 lb)   08/01/17 75.8 kg (167 lb)   04/26/17 79.4 kg (175 lb)              Today, you had the following     No orders found for display       Primary Care Provider Office Phone # Fax #    Samantha SELWYN [facility] [phone] [address] [facility] [address]        Equal Access to Services     SAMARA JUSTICE : Christine Hampton, wageorge cox, qaybta kaalmaida jimenez, chacha day. So [facility] [phone].    ATENCIÓN: Si jewell turner, theron umana de la torre  disposición servicios gratuitos de asistencia lingüística. Nikia mcclain [phone].    We comply with applicable federal civil rights laws and [place] laws. We do not discriminate on the basis of race, color, national origin, age, disability sex, sexual orientation or gender identity.            Thank you!     Thank you for choosing [facility]  for your care. Our goal is always to provide you with excellent care. Hearing back from our patients is one way we can continue to improve our services. Please take a few minutes to complete the written survey that you may receive in the mail after your visit with us. Thank you!             Your Updated Medication List - Protect others around you: Learn how to safely use, store and throw away your medicines at www.disposemymeds.org.          This list is accurate as of: 8/24/17 11:29 AM.  Always use your most recent med list.                   Brand Name Dispense Instructions for use Diagnosis    diclofenac 1 % Gel topical gel    VOLTAREN    100 g    Apply 2-4 grams to affected area four times daily    Complex regional pain syndrome type 2 of right upper extremity       DULoxetine 20 MG EC capsule    CYMBALTA    30 capsule    Take 1 capsule (20 mg) by mouth daily    YELENA (generalized anxiety disorder)       gabapentin 300 MG capsule    NEURONTIN    180 capsule    600 mg TID    Complex regional pain syndrome type 2 of right lower extremity       hydrOXYzine 25 MG tablet    ATARAX    60 tablet    Take 1-2 tablets (25-50 mg) by mouth every 6 hours as needed for itching    Localized pruritus       levothyroxine 50 MCG tablet    SYNTHROID/LEVOTHROID    30 tablet    Take 50 mcg on even days, and 25 mcg (1/2 tab) on odd days.    Hypothyroidism, unspecified type       lidocaine 5 % ointment    XYLOCAINE    100 g    Apply topically 4 times daily as needed for moderate pain    Complex regional pain syndrome type 2 of right upper extremity       simvastatin 20 MG  tablet    ZOCOR    30 tablet    Take 1 tablet (20 mg) by mouth At Bedtime    Hyperlipidemia LDL goal <100       VITAMIN C PO      Take 500 mg by mouth daily

## 2017-08-29 NOTE — TELEPHONE ENCOUNTER
Letter written and given to nursing staff. Please pull requested info and fax to [place].     SELWYN Romo, NP-C  [facility] [place]

## 2017-09-01 NOTE — TELEPHONE ENCOUNTER
The requested information has been faxed to the number provided below.     ANGEL HassanN, RN  Care Coordinator  [facility] [place]

## 2017-09-07 DIAGNOSIS — G90.511 COMPLEX REGIONAL PAIN SYNDROME TYPE 1 OF RIGHT UPPER EXTREMITY: ICD-10-CM

## 2017-09-07 RX ORDER — HYDROCODONE BITARTRATE AND ACETAMINOPHEN 5; 325 MG/1; MG/1
TABLET ORAL
Qty: 20 TABLET | Refills: 0 | Status: SHIPPED | OUTPATIENT
Start: 2017-09-07 | End: 2018-05-23

## 2017-09-07 NOTE — TELEPHONE ENCOUNTER
Pt requesting a script for hydrocodone. I don't see this medication on hercurrent list of meds. She states that DW would know what she's talking about. Please adv

## 2017-09-07 NOTE — TELEPHONE ENCOUNTER
Received call from patient requesting refill(s) of HYDROcodone-acetaminophen (NORCO) 5-325 MG per tablet     Last picked up from pharmacy on 3/7/17    Pt last seen by prescribing provider on 8/24/17  Next appt scheduled for none    Last urine drug screen date 10/17/16  Current opioid agreement on file (completed within the last year) Yes Date of opioid agreement: 11/15/16    Mail to [facility] [place] - [address]  [address] [address]  Phone: [phone] Fax: [phone]       Layne Butler MA  [facility]    Will route to nursing pool for review and preparation of prescription(s).

## 2017-09-07 NOTE — TELEPHONE ENCOUNTER
Diana received 20 tabs of Norco in March of 2017 which has lasted her until now. She reports she spoke to Mallory about this the last time she saw her. Will rout to Mallory Mayo to review request.    Medication refill information reviewed.     Due date for HYDROcodone-acetaminophen (NORCO) 5-325 MG per tablet  is anytime     Prescriptions prepped for review.     Will route to provider.

## 2017-10-17 ENCOUNTER — MYC MEDICAL ADVICE (OUTPATIENT)
Dept: PALLIATIVE MEDICINE | Facility: CLINIC | Age: 62
End: 2017-10-17

## 2017-11-08 ENCOUNTER — TRANSFERRED RECORDS (OUTPATIENT)
Dept: HEALTH INFORMATION MANAGEMENT | Facility: CLINIC | Age: 62
End: 2017-11-08

## 2017-11-09 ENCOUNTER — OFFICE VISIT (OUTPATIENT)
Dept: PALLIATIVE MEDICINE | Facility: CLINIC | Age: 62
End: 2017-11-09
Payer: OTHER MISCELLANEOUS

## 2017-11-09 VITALS — HEART RATE: 99 BPM | OXYGEN SATURATION: 99 % | DIASTOLIC BLOOD PRESSURE: 88 MMHG | SYSTOLIC BLOOD PRESSURE: 140 MMHG

## 2017-11-09 DIAGNOSIS — G90.511 COMPLEX REGIONAL PAIN SYNDROME TYPE 1 OF RIGHT UPPER EXTREMITY: Primary | ICD-10-CM

## 2017-11-09 DIAGNOSIS — F41.1 GAD (GENERALIZED ANXIETY DISORDER): ICD-10-CM

## 2017-11-09 PROCEDURE — 99214 OFFICE O/P EST MOD 30 MIN: CPT | Performed by: NURSE PRACTITIONER

## 2017-11-09 RX ORDER — DULOXETIN HYDROCHLORIDE 20 MG/1
CAPSULE, DELAYED RELEASE ORAL
Qty: 90 CAPSULE | Refills: 3 | Status: SHIPPED | OUTPATIENT
Start: 2017-11-09 | End: 2018-05-23

## 2017-11-09 ASSESSMENT — PAIN SCALES - GENERAL: PAINLEVEL: MODERATE PAIN (4)

## 2017-11-09 NOTE — PROGRESS NOTES
[facility]    CHIEF COMPLAINT: Right wrist pain    INTERVAL HISTORY:  Last seen on 8/24/17.        Recommendations/plan at the last visit included:  1. Schedule follow-up with SELWYN Ferguson NP-C in 8 weeks, or two weeks after [facility] records.   2. Medication recommendations:                No change to current recommendations.        Since her last visit, Diana Buenrostro reports:  - Had consultation at the [facility] regarding CRPS. It is debating between spinal cord stimulator or chronic pain management program at the [facility]    Pain Information:   Pain quality: Aching, Burning, Exhausting, Gnawing, Miserable, Nagging, Numb, Penetrating, Sharp, Shooting, Stabbing, Tender, Throbbing, Tiring and Unbearable    Pain timing: Constant     Pain rating: intensity ranges from 3/10 to 10/10, and averages 5/10 on a 0-10 scale.   Aggravating factors include: Nothing noted   Relieving factors include: Nothing noted      Annual Controlled Substance Agreement/UDS due date: 10/2017, if continuing to prescribe      CURRENT RELEVANT PAIN MEDICATIONS:   Lidocaine 5% ointment  Diclofenac gel apply to affected area 3-4 times daily  Norco 5/325 mg: Half to 1 tablet daily as needed for severe pain. #20 tablets to last 30 days      Patient is using the medication as prescribed: YES  Is your medication helpful? YES, but doesn't last very long   Medication side effects? denies any problems          Previous Pain Relevant Medications: (H--helped; HI--Helped initially; SWH--Somewhat helpful; NH--No help; W--worse; SE--side effects; ?--Unsure if helpful)   NOTE: This medication information taken from patient's intake form, not medical records.   Opiates: Norco: H post op  NSAIDS: none: only has one kidney, donated kidney to her sister  Muscle Relaxants: none  Anti-migraine mediations: none  Anti-depressants: none  Sleep aids:none  Anti-convulsants: none   Topicals: none  Other medications not covered above: Tylenol:  [facility]      Any illicit drug use: no  Any use of prescriptions other than how they were prescribed:no          [place] Board of Pharmacy Data Base Reviewed: YES; No concern for abuse or misuse of controlled medications based on this report.       SELF CARE:   How often do you practice SELF-CARE (relaxing, stretching, pacing, monitoring posture, taking mini-breaks) in a typical day:  Few times per day    Medications:  Current Outpatient Prescriptions   Medication Sig Dispense Refill     HYDROcodone-acetaminophen (NORCO) 5-325 MG per tablet 1/2 to 1 tablet daily as needed for moderate to severe pain. #20 tabs to last 30 days. 20 tablet 0     hydrOXYzine (ATARAX) 25 MG tablet Take 1-2 tablets (25-50 mg) by mouth every 6 hours as needed for itching 60 tablet 1     DULoxetine (CYMBALTA) 20 MG EC capsule Take 1 capsule (20 mg) by mouth daily 30 capsule 3     gabapentin (NEURONTIN) 300 MG capsule 600 mg  capsule 3     levothyroxine (SYNTHROID/LEVOTHROID) 50 MCG tablet Take 50 mcg on even days, and 25 mcg (1/2 tab) on odd days. 30 tablet 11     simvastatin (ZOCOR) 20 MG tablet Take 1 tablet (20 mg) by mouth At Bedtime 30 tablet 11     Ascorbic Acid (VITAMIN C PO) Take 500 mg by mouth daily         Review of Systems: A 10-point review of systems was negative, with the exception of chronic pain issues.      Social History: Reviewed; unchanged from initial consultation.      Family history: Reviewed; unchanged from initial consultation.     PHYSICAL EXAM    Vitals:    11/09/17 1254   BP: 140/88   Pulse: 99   SpO2: 99%       Constitutional: healthy, alert and moderate distress  HEENT: Head atraumatic, normocephalic. Eyes without conjunctival injection or jaundice. Neck supple. No obvious neck masses.  Skin: No rash, lesions, or petechiae of exposed skin.   Extremities: Peripheral pulses intact. No clubbing, cyanosis, or edema. Affected extremity is cooler than opposite extremity. No coloration difference today. Diminished  strength.  Psychiatric/mental status: Alert, without lethargy or stupor. Appropriate affect. Mood anxious, tearful at times  Neurologic exam:  CN: Cranial nerves 2-12 are grossly normal.  Motor:  3/5 RUE, 5/5 LUE      Sensory exam:  Light touch: Painful in right upper extremity   RUE allodynia     [facility] Criteria for diagnosis of Complex Regional Pain Syndrome:    Continuing pain disproportionate to inciting event: positive  Must report one symptom (subjective) in two of the following four categories:                        Sensory (hyperalgesia or allodynia): positive for hyperalgesia, allodynia                        Vasomotor (temperature asymmetry, skin color changes, skin color asymmetry): positive for temperature asymmetry, skin color cahange and assymetry                        Pseudomotor/edema (edema, sweating or sweating asymmetry): negative                        Motor/trophic (decreased range of motion, motor dysfunction, trophic changes in skin, hair or nails): positive for decreased ROM, motor dysfunction, changes in skin  Must display (objective) one sign in two or more of the following four categories:                        Sensory (hyperalgesia or allodynia): positive for Hyperalgesia and allodynia                        Vasomotor (temperature asymmetry, skin color changes, skin color asymmetry): positive for skin color changes, skin color asymmetery                        Pseudomotor/edema (edema, sweating or sweating asymmetry): negative                        Motor/trophic (decreased range of motion, motor dysfunction, trophic changes in skin, hair or nails): positive for decreased ROM, motor dysfuntion, trophic changes in skin   Patient does meet criteria for Complex Regional Pain Syndrome         DIRE Score for ongoing opioid management is calculated as follows:  Diagnosis = 2 pts (slowly progressive; moderate pain/objective findings)  Intractability = 3 pts (patient fully engaged but  inadequate response to treatments)  Risk   Psych = 3 pts (no significant personality dysfunction/mental illness; good communication with clinic)   Chem Hlth = 3 pts (no history of chemical dependency; not drug-focused)  Reliability = 3 pts (highly reliable with meds, appointments, treatments)  Social = 3 pts (supportive family/close relationships; involved in work/school; no isolation)  (Psych + Chem hlth + Reliability + Social) = 17  Efficacy = 2 pts (moderate benefit/function; low med dose; too early/not tried meds)  DIRE Score = 19   7-13: likely NOT suitable candidate for long-term opioid analgesia  14-21: may be a suitable candidate for long-term opioid analgesia      DIAGNOSTIC TESTS:  Imaging Studies:   No new imaging      Assessment:   1. CRPS affecting the right upper extremity  2. Ganglion cyst, left wrist.   3. Depression, adjustment disorder    Diana presents in the company of her [facility] rep Marlyn. As noted above she was seen at the [facility] and is debating between a trial of a spinal cord stimulator and they're chronic pain management program. Ultimately this comes down to a decision between continuing to look for ways to reduce her pain versus accept her pain. Discussed that I think both of these are options that would be beneficial. I do think that a spinal cord stimulator is a good option for her and have encouraged her to seriously consider this. May also recommended increasing her duloxetine dose and I have written for that. The emotional component of her pain is still difficult for her and I have advised that particularly in light of considering spinal cord stimulator that she return to health psychology. I would also like her to have another stellate ganglion block while she is in the process of determining her neck steps with [facility] because she does get improved pain control and function following the block injections.      Plan:    Diagnosis reviewed, treatment option addressed, and  risk/benifits discussed.  Self-care instructions given.  I am recommending a multidisciplinary treatment plan to help this patient better manage pain.        1. Schedule pain psychology visit with Sergio Juarez  2. Continue home exercise program  3. Schedule follow-up with SELWYN Ferguson, NP-C in 4-8 weeks as needed.   4. Procedures recommended: Stellate ganglion blocks   5. Medication recommendations:   1. Cymbalta (Duloxetine): Increase to 40 mg (2 capsules) for at least five days, then increase to 60 mg daily. Okay to split doses and take 40 mg in the morning and 20 mg at bedtime if you prefer.     Total time spent face to face was 30 minutes and more than 50% of face to face time was spent in counseling and/or coordination of care regarding the diagnosis and recommendations above.      SELWYN Romo, NP-C   [facility]

## 2017-11-09 NOTE — PATIENT INSTRUCTIONS
After Visit Instructions:     Thank you for coming to [facility] for your care. It is my goal to partner with you to help you reach your optimal state of health.     I am recommending multidisciplinary care at this time.  The focus of care will be to continue gradual rehabilitation and pain management with medication adjustments as needed.    Continue daily self-care, identifying contributing factors, and monitoring variations in pain level. Continue to integrate self-care into your life.      1. Schedule pain psychology visit with Sergio Juarez  2. Continue home exercise program  3. Schedule follow-up with SELWYN Ferguson NP-C in 4-8 weeks as needed.   4. Procedures recommended: Stellate ganglion blocks   5. Medication recommendations:   1. Cymbalta (Duloxetine): Increase to 40 mg (2 capsules) for at least five days, then increase to 60 mg daily. Okay to split doses and take 40 mg in the morning and 20 mg at bedtime if you prefer.       SELWYN Romo NP-C  [facility]    Contact information: [facility]    Please call if any side effects, questions, or concerns arise.    Nurse Triage line:  [phone]   Call this number with any questions or concerns. You may leave a detailed message anytime. Calls are typically returned Monday through Friday between 8 AM and 4:30 PM. We usually get back to you within 2 business days depending on the issue/request.    Scheduling number: [phone].  Call this number to schedule or change appointments.          Medication Refills Policy:    For non-narcotic medications, please your pharmacy directly to request a refill and the pharmacy will call the [facility] Management Center for authorization. Please allow 3-4 days for these refills.    For narcotic refills, call the nurse triage line and leave a message requesting your refill along with the name of the pharmacy that you use. Narcotic  prescriptions will be mailed to your pharmacy or you may pick them up at the clinic.  Please call 7-10 days before your refill is due  The above policy allows adequate time so that you do not run out of medication.    No Show - Late Cancellation - Late Arrival Policy  We believe regular attendance is key to your success in our program.    Any time you are unable to keep your appointment we ask that you call us at  least 24 hours in advance to let us know. This will allow us to offer the appointment time to another patient. The following is our policy for missed appointments. This also applies to appointments cancelled with less than 24 hours notice.    You will receive a letter after missing your 1st and 2nd appointments without contacting the clinic before your scheduled appointment time.     After missing 3 appointments without calling first, we will cancel all of your future appointments at [facility].    At that point, you will not be able to resume services unless approved by your care team  We understand that unforseen circumstances arise, however, out of respect for all concerned and to provide this appointment to another patient, this policy will be enforced.    Please note that most follow up appointments are 30 minutes long. If you arrive late, your provider may not be able to see you for the entire 30 minutes. Please also note that if you arrive more than 15 minutes for any appointment, it may be rescheduled.

## 2017-11-09 NOTE — LETTER
[facility]  [address]  [address]  [facility] [id number] [phone]           [facility]  [address]  [address]  [facility] [address]  Phone: [phone]  Fax: [phone]               PATIENT DATA             REPORT OF WORK ABILITY  [facility] [place]  [address]  [address]  [facility] [address] [phone]  PATIENT DATA      Employee Name: Diana Buenrostro      : 1955      SS#: [id number]      Work related injury: Yes  Employer at time of injury: [employer]  Employer contact & phone: [phone]  Employed elsewhere? No  Workers' Compensation Carrier/Managed Care Plan:  [facility] [employer]      Today's date: 17  Date of injury: 12/8/15  Date of first visit: 16      PROVIDER EVALUATION:   1. Diagnosis: Complex Regional Pain Syndrome affecting right upper extremity  2. Treatment: Medication, nerve blocks (patient will call to discuss in 2 weeks),  OT/PT.  3. Medication: Gabapentin, Cymbalta, lidoderm patches PRN, diclofenac gel PRN, Norco PRN, Methocarbamol PRN  4.  Hand therpay is completed. Continue daily exercises  5. Return to work date: No work at this time, Not less than six months from today, 18      RESTRICTIONS: No work at this time.       Maximum Medical Improvement (Date): Unknown  Any Permanent Partial Disability? Deferred to future exam/consult.            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

## 2017-11-09 NOTE — MR AVS SNAPSHOT
After Visit Summary   11/9/2017    Diana Buenrostro    MRN: [id number]           Patient Information     Date Of Birth          1955        Visit Information        Provider Department      11/9/2017 1:00 PM Mallory Mayo APRN CNP [facility] [place]        Today's Diagnoses     Complex regional pain syndrome type 1 of right upper extremity    -  1    YELENA (generalized anxiety disorder)          Care Instructions    After Visit Instructions:     Thank you for coming to [facility] for your care. It is my goal to partner with you to help you reach your optimal state of health.     I am recommending multidisciplinary care at this time.  The focus of care will be to continue gradual rehabilitation and pain management with medication adjustments as needed.    Continue daily self-care, identifying contributing factors, and monitoring variations in pain level. Continue to integrate self-care into your life.      1. Schedule pain psychology visit with Sergio Juarez  2. Continue home exercise program  3. Schedule follow-up with SELWYN Ferguson NP-C in 4-8 weeks as needed.   4. Procedures recommended: Stellate ganglion blocks   5. Medication recommendations:   1. Cymbalta (Duloxetine): Increase to 40 mg (2 capsules) for at least five days, then increase to 60 mg daily. Okay to split doses and take 40 mg in the morning and 20 mg at bedtime if you prefer.       SELWYN Romo NP-C  [facility]    Contact information: [facility] [place]    Please call if any side effects, questions, or concerns arise.    Nurse Triage line:  [phone]   Call this number with any questions or concerns. You may leave a detailed message anytime. Calls are typically returned Monday through Friday between 8 AM and 4:30 PM. We usually get back to you within 2 business days depending on the issue/request.    Scheduling number:  [phone].  Call this number to schedule or change appointments.          Medication Refills Policy:    For non-narcotic medications, please your pharmacy directly to request a refill and the pharmacy will call the [facility] Center for authorization. Please allow 3-4 days for these refills.    For narcotic refills, call the nurse triage line and leave a message requesting your refill along with the name of the pharmacy that you use. Narcotic prescriptions will be mailed to your pharmacy or you may pick them up at the clinic.  Please call 7-10 days before your refill is due  The above policy allows adequate time so that you do not run out of medication.    No Show - Late Cancellation - Late Arrival Policy  We believe regular attendance is key to your success in our program.    Any time you are unable to keep your appointment we ask that you call us at  least 24 hours in advance to let us know. This will allow us to offer the appointment time to another patient. The following is our policy for missed appointments. This also applies to appointments cancelled with less than 24 hours notice.    You will receive a letter after missing your 1st and 2nd appointments without contacting the clinic before your scheduled appointment time.     After missing 3 appointments without calling first, we will cancel all of your future appointments at [facility].    At that point, you will not be able to resume services unless approved by your care team  We understand that unforseen circumstances arise, however, out of respect for all concerned and to provide this appointment to another patient, this policy will be enforced.    Please note that most follow up appointments are 30 minutes long. If you arrive late, your provider may not be able to see you for the entire 30 minutes. Please also note that if you arrive more than 15 minutes for any appointment, it may be rescheduled.                                      Follow-ups after your visit        Who to contact     If you have questions or need follow up information about today's clinic visit or your schedule please contact [facility] directly at [phone].  Normal or non-critical lab and imaging results will be communicated to you by MyChart, letter or phone within 4 business days after the clinic has received the results. If you do not hear from us within 7 days, please contact the clinic through [employer] or phone. If you have a critical or abnormal lab result, we will notify you by phone as soon as possible.  Submit refill requests through [employer] or call your pharmacy and they will forward the refill request to us. Please allow 3 business days for your refill to be completed.          Additional Information About Your Visit        [employer] Information     Shout gives you secure access to your electronic health record. If you see a primary care provider, you can also send messages to your care team and make appointments. If you have questions, please call your primary care clinic.  If you do not have a primary care provider, please call [phone] and they will assist you.        Care EveryWhere ID     This is your Care EveryWhere ID. This could be used by other organizations to access your [facility] medical records  [id number]        Your Vitals Were     Pulse Pulse Oximetry                99 99%           Blood Pressure from Last 3 Encounters:   11/09/17 140/88   08/24/17 102/62   08/01/17 131/74    Weight from Last 3 Encounters:   08/24/17 74.8 kg (165 lb)   08/01/17 75.8 kg (167 lb)   04/26/17 79.4 kg (175 lb)              Today, you had the following     No orders found for display         Today's Medication Changes          These changes are accurate as of: 11/9/17  1:41 PM.  If you have any questions, ask your nurse or doctor.               These medicines have changed or have updated prescriptions.         Dose/Directions    DULoxetine 20 MG EC capsule   Commonly known as:  CYMBALTA   This may have changed:    - how much to take  - how to take this  - when to take this  - additional instructions   Used for:  YELENA (generalized anxiety disorder), Complex regional pain syndrome type 1 of right upper extremity        Take 40 mg for five days and then increase to 60 mg daily.   Quantity:  90 capsule   Refills:  3            Where to get your medicines      These medications were sent to [place] [facility] [place] [place] - [address]  [address], [place] [address]    Hours:  MARIE [place] [facility] Phone:  [phone]     DULoxetine 20 MG EC capsule                Primary Care Provider Office Phone # Fax #    SELWYN Cifuentes [facility] [phone] [address] [facility] [address]        Equal Access to Services     SAMARA JUSTICE : Hadii niharika huio Sosilvia, waaxda luqadaha, qaybta kaalmada adeegyada, chacha carroll . So [facility] [phone].    ATENCIÓN: Si habla español, tiene a de la torre disposición servicios gratuitos de asistencia lingüística. Llame al [phone].    We comply with applicable federal civil rights laws and [place] laws. We do not discriminate on the basis of race, color, national origin, age, disability, sex, sexual orientation, or gender identity.            Thank you!     Thank you for choosing [facility] [place]  for your care. Our goal is always to provide you with excellent care. Hearing back from our patients is one way we can continue to improve our services. Please take a few minutes to complete the written survey that you may receive in the mail after your visit with us. Thank you!             Your Updated Medication List - Protect others around you: Learn how to safely use, store and throw away your medicines at www.disposemymeds.org.          This list is accurate as of: 11/9/17  1:41 PM.  Always use  your most recent med list.                   Brand Name Dispense Instructions for use Diagnosis    DULoxetine 20 MG EC capsule    CYMBALTA    90 capsule    Take 40 mg for five days and then increase to 60 mg daily.    YELENA (generalized anxiety disorder), Complex regional pain syndrome type 1 of right upper extremity       gabapentin 300 MG capsule    NEURONTIN    180 capsule    600 mg TID    Complex regional pain syndrome type 2 of right lower extremity       HYDROcodone-acetaminophen 5-325 MG per tablet    NORCO    20 tablet    1/2 to 1 tablet daily as needed for moderate to severe pain. #20 tabs to last 30 days.    Complex regional pain syndrome type 1 of right upper extremity       hydrOXYzine 25 MG tablet    ATARAX    60 tablet    Take 1-2 tablets (25-50 mg) by mouth every 6 hours as needed for itching    Localized pruritus       levothyroxine 50 MCG tablet    SYNTHROID/LEVOTHROID    30 tablet    Take 50 mcg on even days, and 25 mcg (1/2 tab) on odd days.    Hypothyroidism, unspecified type       simvastatin 20 MG tablet    ZOCOR    30 tablet    Take 1 tablet (20 mg) by mouth At Bedtime    Hyperlipidemia LDL goal <100       VITAMIN C PO      Take 500 mg by mouth daily

## 2017-12-13 ENCOUNTER — HOSPITAL ENCOUNTER (OUTPATIENT)
Dept: RADIOLOGY | Facility: CLINIC | Age: 62
Discharge: HOME | End: 2017-12-13
Attending: ANESTHESIOLOGY | Admitting: ANESTHESIOLOGY
Payer: OTHER MISCELLANEOUS

## 2017-12-13 ENCOUNTER — RADIOLOGY INJECTION OFFICE VISIT (OUTPATIENT)
Dept: PALLIATIVE MEDICINE | Facility: CLINIC | Age: 62
End: 2017-12-13
Payer: OTHER MISCELLANEOUS

## 2017-12-13 VITALS — HEART RATE: 77 BPM | SYSTOLIC BLOOD PRESSURE: 156 MMHG | RESPIRATION RATE: 16 BRPM | DIASTOLIC BLOOD PRESSURE: 83 MMHG

## 2017-12-13 DIAGNOSIS — G56.41 COMPLEX REGIONAL PAIN SYNDROME TYPE 2 OF RIGHT UPPER EXTREMITY: ICD-10-CM

## 2017-12-13 DIAGNOSIS — G89.29 CHRONIC PAIN OF RIGHT UPPER EXTREMITY: ICD-10-CM

## 2017-12-13 DIAGNOSIS — M79.601 CHRONIC PAIN OF RIGHT UPPER EXTREMITY: ICD-10-CM

## 2017-12-13 DIAGNOSIS — G56.41 COMPLEX REGIONAL PAIN SYNDROME TYPE 2 OF RIGHT UPPER EXTREMITY: Primary | ICD-10-CM

## 2017-12-13 PROCEDURE — 25000125 ZZHC RX 250: Performed by: ANESTHESIOLOGY

## 2017-12-13 PROCEDURE — 25000128 H RX IP 250 OP 636: Performed by: ANESTHESIOLOGY

## 2017-12-13 PROCEDURE — 27210202: Mod: TC

## 2017-12-13 PROCEDURE — 64510 N BLOCK STELLATE GANGLION: CPT | Mod: RT | Performed by: ANESTHESIOLOGY

## 2017-12-13 RX ORDER — IOPAMIDOL 612 MG/ML
3 INJECTION, SOLUTION INTRATHECAL ONCE
Status: COMPLETED | OUTPATIENT
Start: 2017-12-13 | End: 2017-12-13

## 2017-12-13 RX ORDER — LIDOCAINE HYDROCHLORIDE 10 MG/ML
5 INJECTION, SOLUTION EPIDURAL; INFILTRATION; INTRACAUDAL; PERINEURAL ONCE
Status: COMPLETED | OUTPATIENT
Start: 2017-12-13 | End: 2017-12-13

## 2017-12-13 RX ORDER — METHYLPREDNISOLONE ACETATE 40 MG/ML
40 INJECTION, SUSPENSION INTRA-ARTICULAR; INTRALESIONAL; INTRAMUSCULAR; SOFT TISSUE ONCE
Status: COMPLETED | OUTPATIENT
Start: 2017-12-13 | End: 2017-12-13

## 2017-12-13 RX ADMIN — IOPAMIDOL 2 ML: 612 INJECTION, SOLUTION INTRATHECAL at 15:37

## 2017-12-13 RX ADMIN — METHYLPREDNISOLONE ACETATE 40 MG: 40 INJECTION, SUSPENSION INTRA-ARTICULAR; INTRALESIONAL; INTRAMUSCULAR; SOFT TISSUE at 15:36

## 2017-12-13 RX ADMIN — ROPIVACAINE HYDROCHLORIDE 6 ML: 2 INJECTION, SOLUTION EPIDURAL; INFILTRATION at 15:36

## 2017-12-13 RX ADMIN — LIDOCAINE HYDROCHLORIDE 4 ML: 10 INJECTION, SOLUTION EPIDURAL; INFILTRATION; INTRACAUDAL; PERINEURAL at 15:35

## 2017-12-13 NOTE — PROGRESS NOTES
Pre-procedure Intake    Have you been fasting? NA    If yes, for how long?     Are you taking a prescribed blood thinner such as coumadin, Plavix, Xarelto?    No    If yes, when did you take your last dose?     Do you take aspirin?  No    If cervical procedure, have you held aspirin for 6 days?   NA    Do you have any allergies to contrast dye, iodine, steroid and/or numbing medications?  NO    Are you currently taking antibiotics or have an active infection?  NO    Have you had a fever/elevated temperature within the past week? NO    Are you currently taking oral steroids? NO    Do you have a ? Yes       Are you pregnant or breastfeeding?  NO    Are the vital signs normal?  Yes

## 2017-12-13 NOTE — IP AVS SNAPSHOT
[facility]    [address]    [facility] [address]    Phone:  [phone]    Fax:  [phone]                                       After Visit Summary   12/13/2017    Diana Buenrostro    MRN: [id number]           After Visit Summary Signature Page     I have received my discharge instructions, and my questions have been answered. I have discussed any challenges I see with this plan with the nurse or doctor.    ..........................................................................................................................................  Patient/Patient Representative Signature      ..........................................................................................................................................  Patient Representative Print Name and Relationship to Patient    ..................................................               ................................................  Date                                            Time    ..........................................................................................................................................  Reviewed by Signature/Title    ...................................................              ..............................................  Date                                                            Time

## 2017-12-13 NOTE — IP AVS SNAPSHOT
MRN:[id number]                      After Visit Summary   12/13/2017    Diana Buenrostro    MRN: [id number]           Visit Information        Provider Department      12/13/2017  3:00 PM G[facility]PSY [facility]           Review of your medicines      UNREVIEWED medicines. Ask your doctor about these medicines        Dose / Directions    DULoxetine 20 MG EC capsule   Commonly known as:  CYMBALTA   Used for:  YELENA (generalized anxiety disorder), Complex regional pain syndrome type 1 of right upper extremity        Take 40 mg for five days and then increase to 60 mg daily.   Quantity:  90 capsule   Refills:  3       gabapentin 300 MG capsule   Commonly known as:  NEURONTIN   Used for:  Complex regional pain syndrome type 2 of right lower extremity        600 mg TID   Quantity:  180 capsule   Refills:  3       HYDROcodone-acetaminophen 5-325 MG per tablet   Commonly known as:  NORCO   Used for:  Complex regional pain syndrome type 1 of right upper extremity        1/2 to 1 tablet daily as needed for moderate to severe pain. #20 tabs to last 30 days.   Quantity:  20 tablet   Refills:  0       hydrOXYzine 25 MG tablet   Commonly known as:  ATARAX   Used for:  Localized pruritus        Dose:  25-50 mg   Take 1-2 tablets (25-50 mg) by mouth every 6 hours as needed for itching   Quantity:  60 tablet   Refills:  1       levothyroxine 50 MCG tablet   Commonly known as:  SYNTHROID/LEVOTHROID   Used for:  Hypothyroidism, unspecified type        Take 50 mcg on even days, and 25 mcg (1/2 tab) on odd days.   Quantity:  30 tablet   Refills:  11       simvastatin 20 MG tablet   Commonly known as:  ZOCOR   Used for:  Hyperlipidemia LDL goal <100        Dose:  20 mg   Take 1 tablet (20 mg) by mouth At Bedtime   Quantity:  30 tablet   Refills:  11       VITAMIN C PO        Dose:  500 mg   Take 500 mg by mouth daily   Refills:  0                Protect others around you: Learn how to safely use, store and  throw away your medicines at www.disposemymeds.org.         Follow-ups after your visit         Care Instructions        Further instructions from your care team       [facility]   Procedure Discharge Instructions    Today you saw:    Dr. Gaurang Pena    You had an:  Right Stellite Ganglion Block     Medications used:  Lidocaine   Ropivicaine Depo Medrol   Iso Armando            Do not drive for 6 hours. The effect of the local anesthetic could slow your reflexes.     You may resume your regular activities after 24 hours    Avoid strenuous activity for the first 24 hours    You may shower, however avoid swimming, tub baths or hot tubs for 24 hours following your procedure    You may have a mild to moderate increase in pain for several days following the injection.    It may take up to 14 days for the steroid medication to start working although you may feel the effect as early as a few days after the procedure.       You may use ice packs for 10-15 minutes, 3 to 4 times a day at the injection site for comfort    Do not use heat to painful areas for 6 to 8 hours. This will give the local anesthetic time to wear off and prevent you from accidentally burning your skin.     You may use anti-inflammatory medications (such as Ibuprofen or Aleve or Advil) or Tylenol for pain control if necessary    If you were fasting, you may resume your normal diet and medications after the procedure    If you have diabetes, check your blood sugar more frequently than usual as your blood sugar may be higher than normal for 10-14 days following a steroid injection. Contact your doctor who manages your diabetes if your blood sugar is higher than usual    If you experience any of the following, call the pain center nursing line during work hours at [phone] or the after hours provider line at [phone]:  -Fever over 100 degree F  -Swelling, bleeding, redness, drainage, warmth at the injection site  -Progressive  weakness or numbness in your arms  -Unusual headache that is not relieved by Tylenol or other pain reliever  -Unusual new onset of pain that is not improving      Phone #s:  Appointment line: [phone];  Nurse line: [phone]         Additional Information About Your Visit        MyChart Information     [employer] gives you secure access to your electronic health record. If you see a primary care provider, you can also send messages to your care team and make appointments. If you have questions, please call your primary care clinic.  If you do not have a primary care provider, please call [phone] and they will assist you.        Care EveryWhere ID     This is your Care EveryWhere ID. This could be used by other organizations to access your [facility] medical records  [id number]        Your Vitals Were     Blood Pressure Pulse Respirations             140/75 72 16          Primary Care Provider Office Phone # Fax #    SELWYN Cifuentes [facility] [phone]      Equal Access to Services     SAMARA JUSTICE : Hadii aad ku hadasho Soomaali, waaxda luqadaha, qaybta kaalmada adeegyada, chacha carroll . So [facility] [phone].    ATENCIÓN: Si habla español, tiene a de la torre disposición servicios gratuitos de asistencia lingüística. Llame al [phone].    We comply with applicable federal civil rights laws and [place] laws. We do not discriminate on the basis of race, color, national origin, age, disability, sex, sexual orientation, or gender identity.            Thank you!     Thank you for choosing [facility] for your care. Our goal is always to provide you with excellent care. Hearing back from our patients is one way we can continue to improve our services. Please take a few minutes to complete the written survey that you may receive in the mail after you visit with us. Thank you!             Medication List: This is a list of all your medications and when to take them. Check  marks below indicate your daily home schedule. Keep this list as a reference.      Medications           Morning Afternoon Evening Bedtime As Needed    DULoxetine 20 MG EC capsule   Commonly known as:  CYMBALTA   Take 40 mg for five days and then increase to 60 mg daily.                                gabapentin 300 MG capsule   Commonly known as:  NEURONTIN   600 mg TID                                HYDROcodone-acetaminophen 5-325 MG per tablet   Commonly known as:  NORCO   1/2 to 1 tablet daily as needed for moderate to severe pain. #20 tabs to last 30 days.                                hydrOXYzine 25 MG tablet   Commonly known as:  ATARAX   Take 1-2 tablets (25-50 mg) by mouth every 6 hours as needed for itching                                levothyroxine 50 MCG tablet   Commonly known as:  SYNTHROID/LEVOTHROID   Take 50 mcg on even days, and 25 mcg (1/2 tab) on odd days.                                simvastatin 20 MG tablet   Commonly known as:  ZOCOR   Take 1 tablet (20 mg) by mouth At Bedtime                                VITAMIN C PO   Take 500 mg by mouth daily

## 2017-12-13 NOTE — DISCHARGE INSTRUCTIONS
[facility]   Procedure Discharge Instructions    Today you saw:    Dr. Gaurang Pena    You had an:  Right Stellite Ganglion Block     Medications used:  Lidocaine   Ropivicaine Depo Medrol   Iso Armando            Do not drive for 6 hours. The effect of the local anesthetic could slow your reflexes.     You may resume your regular activities after 24 hours    Avoid strenuous activity for the first 24 hours    You may shower, however avoid swimming, tub baths or hot tubs for 24 hours following your procedure    You may have a mild to moderate increase in pain for several days following the injection.    It may take up to 14 days for the steroid medication to start working although you may feel the effect as early as a few days after the procedure.       You may use ice packs for 10-15 minutes, 3 to 4 times a day at the injection site for comfort    Do not use heat to painful areas for 6 to 8 hours. This will give the local anesthetic time to wear off and prevent you from accidentally burning your skin.     You may use anti-inflammatory medications (such as Ibuprofen or Aleve or Advil) or Tylenol for pain control if necessary    If you were fasting, you may resume your normal diet and medications after the procedure    If you have diabetes, check your blood sugar more frequently than usual as your blood sugar may be higher than normal for 10-14 days following a steroid injection. Contact your doctor who manages your diabetes if your blood sugar is higher than usual    If you experience any of the following, call the pain center nursing line during work hours at [phone] or the after hours provider line at [phone]:  -Fever over 100 degree F  -Swelling, bleeding, redness, drainage, warmth at the injection site  -Progressive weakness or numbness in your arms  -Unusual headache that is not relieved by Tylenol or other pain reliever  -Unusual new onset of pain that is not  improving      Phone #s:  Appointment line: [phone];  Nurse line: [phone]

## 2017-12-13 NOTE — PROGRESS NOTES
Pre procedure Diagnosis: CRPS of the right upper extremity, chronic right upper extremity pain  Post proceudure Diagnosis:Same  Procedure performed: Stellate ganglion block at C7 on the right under fluorsoscopic guidance, contrast controlled  Anesthesia: local  Complications: none  Operators: Gaurang Pena MD    INDICATIONS:   Diana Buenrostro is a 62 year old female with a h/o CRPS of the right upper extremity.  SELWYN Ferguson referred her for a repeat right stellate ganglion block. Examination findings revealed pale and cold wrist and fingers on the right hand when compared to the left as well as what appeared to be a bruise with erythema at the distal radius at the site of her worst pain.  Also noted was decreased ability to form a fist of the right hand.    Options, benefits and risks were discussed with the patient including bleeding, infection, hoarseness, droopy face/eye, swallowing problems, no pain relief, seizure, stroke, paralysis, nerve injury, spinal cord injury, spinal headache, spinal fluid leak, coma, death. Questions were answered to her satisfaction and she agrees to proceed. Voluntary informed consent was obtained and signed.  The patient's medical history, medications, and allergies were reviewed and reconciled.    PROCEDURE IN DETAIL:  After obtaining signed informed consent the patient was brought into the procedure suit and placed in a supine position on the procedure table. Patient's neck area was prepped and draped in the usual sterile fashion. The transverse process of C7 on the right side was identified under AP fluoroscopic guidance. Soft tissue of her neck were retracted using my left hand until palpation of transverse process was obtained. 1 ml of 1% lidocaine mixed with sodium bicarbonate was injected at the needle entry point and needle tract using a 30 gauge 1 inch needle. Then a 27 gauge 3.5 inch quincke type spinal needle was inserted and slowly advanced under  fluoroscopic guidance until bony contact was made. Lateral fluoroscopic guidance was also obtained to confirm the needle depth and position. After negative aspiration for heme and CSF, 2 cc of Isovue contrast dye was injected. There was no vascular uptake, the contrast was spreading distally.  Isovue wasted:  1 ml.  Then, a test dose was performed by injecting 3 ml of Lidocaine 1% that was negative for adverse effects and the patient noted some warming of the right thumb and index finger.  Then I injected 7 ml of a combination of Ropivicaine 0.2% 6 ml with Depomedrol 40 mg slowly and incrementally with frequent intermittent aspirations. The needle was then restyletted and withdrawn, the patient's neck was cleansed and patient was brought to the recovery area.     In the recovery area patient reported good pain relief. Patient had Lili syndrome as well as hyperemia and increased temperature of the right arm and hand which is an indication of a successful stellate ganglion block. Patient's pre procedure  pain intensity score was 7/10 and post procedure pain intensity score was 5/10.     Patient will continue monitoring progress and she will be seen in the clinic for follow up  in 2-3 weeks. If she gets significant improvement in her symptoms with the sympathetic block, the blocks may need to be repeated weekly or biweekly to get the best response with physical therapy or increased use of the upper extremity immediately after the block for desensitization.     The patient was then discharged in good condition with discharge instructions.       Gaurang Pena MD  [facility]

## 2017-12-18 ENCOUNTER — MYC MEDICAL ADVICE (OUTPATIENT)
Dept: PALLIATIVE MEDICINE | Facility: CLINIC | Age: 62
End: 2017-12-18

## 2018-03-05 DIAGNOSIS — E03.9 HYPOTHYROIDISM, UNSPECIFIED TYPE: ICD-10-CM

## 2018-03-05 RX ORDER — LEVOTHYROXINE SODIUM 50 UG/1
TABLET ORAL
Qty: 30 TABLET | Refills: 0 | Status: SHIPPED | OUTPATIENT
Start: 2018-03-05 | End: 2018-04-17

## 2018-03-05 NOTE — TELEPHONE ENCOUNTER
"Requested Prescriptions   Pending Prescriptions Disp Refills     levothyroxine (SYNTHROID/LEVOTHROID) 50 MCG tablet  Last Written Prescription Date:  02/28/17  Last Fill Quantity: 30,  # refills: 11   Last office visit: 8/1/2017 with prescribing provider:  08/01/17   Future Office Visit:     30 tablet 11     Sig: Take 50 mcg on even days, and 25 mcg (1/2 tab) on odd days.    Thyroid Protocol Failed    3/5/2018  7:46 AM       Failed - Normal TSH on file in past 12 months    Recent Labs   Lab Test  11/17/16   1548   TSH  1.38          Passed - Patient is 12 years or older       Passed - Recent (12 mo) or future (30 days) visit within the authorizing provider's specialty    Patient had office visit in the last year or has a visit in the next 30 days with authorizing provider.  See \"Patient Info\" tab in inbasket, or \"Choose Columns\" in Meds & Orders section of the refill encounter.        Passed - No active pregnancy on record    If patient is pregnant or has had a positive pregnancy test, please check TSH       Passed - No positive pregnancy test in past 12 months    If patient is pregnant or has had a positive pregnancy test, please check TSH.          "

## 2018-04-17 DIAGNOSIS — E03.9 HYPOTHYROIDISM, UNSPECIFIED TYPE: ICD-10-CM

## 2018-04-17 NOTE — TELEPHONE ENCOUNTER
"Requested Prescriptions   Pending Prescriptions Disp Refills     levothyroxine (SYNTHROID/LEVOTHROID) 50 MCG tablet  Last Written Prescription Date:  03/05/2018  Last Fill Quantity: 30,  # refills: 0   Last office visit: 8/1/2017 with prescribing provider:  Madhuri   Future Office Visit:   Next 5 appointments (look out 90 days)     May 23, 2018 11:30 AM CDT   Return Visit with SELWYN Gaona CNP   [facility] ([facility]    [address]  [facility] [address] [phone]                  30 tablet 0     Sig: Take 50 mcg on even days, and 25 mcg (1/2 tab) on odd days. (Needs follow-up appointment for this medication)    Thyroid Protocol Failed    4/17/2018 11:11 AM       Failed - Normal TSH on file in past 12 months    Recent Labs   Lab Test  11/17/16   1548   TSH  1.38             Passed - Patient is 12 years or older       Passed - Recent (12 mo) or future (30 days) visit within the authorizing provider's specialty    Patient had office visit in the last 12 months or has a visit in the next 30 days with authorizing provider or within the authorizing provider's specialty.  See \"Patient Info\" tab in inbasket, or \"Choose Columns\" in Meds & Orders section of the refill encounter.           Passed - No active pregnancy on record    If patient is pregnant or has had a positive pregnancy test, please check TSH.         Passed - No positive pregnancy test in past 12 months    If patient is pregnant or has had a positive pregnancy test, please check TSH.            "

## 2018-04-19 RX ORDER — LEVOTHYROXINE SODIUM 50 UG/1
TABLET ORAL
Qty: 30 TABLET | Refills: 0 | Status: SHIPPED | OUTPATIENT
Start: 2018-04-19 | End: 2018-05-31

## 2018-05-23 ENCOUNTER — OFFICE VISIT (OUTPATIENT)
Dept: PALLIATIVE MEDICINE | Facility: CLINIC | Age: 63
End: 2018-05-23
Payer: OTHER MISCELLANEOUS

## 2018-05-23 VITALS
HEIGHT: 69 IN | SYSTOLIC BLOOD PRESSURE: 112 MMHG | WEIGHT: 163 LBS | RESPIRATION RATE: 16 BRPM | BODY MASS INDEX: 24.14 KG/M2 | HEART RATE: 97 BPM | OXYGEN SATURATION: 98 % | DIASTOLIC BLOOD PRESSURE: 70 MMHG

## 2018-05-23 DIAGNOSIS — F41.1 GAD (GENERALIZED ANXIETY DISORDER): ICD-10-CM

## 2018-05-23 DIAGNOSIS — G90.511 COMPLEX REGIONAL PAIN SYNDROME TYPE 1 OF RIGHT UPPER EXTREMITY: ICD-10-CM

## 2018-05-23 PROCEDURE — 99214 OFFICE O/P EST MOD 30 MIN: CPT | Performed by: NURSE PRACTITIONER

## 2018-05-23 RX ORDER — DULOXETIN HYDROCHLORIDE 20 MG/1
CAPSULE, DELAYED RELEASE ORAL
Qty: 30 CAPSULE | Refills: 3 | COMMUNITY
Start: 2018-05-23 | End: 2018-08-21

## 2018-05-23 ASSESSMENT — PAIN SCALES - GENERAL: PAINLEVEL: MODERATE PAIN (4)

## 2018-05-23 NOTE — LETTER
May 23, 2018    Diana Buenrostro  [address] [address]           REPORT OF WORK ABILITY  [facility]  [address]  [address]  [facility] [address] [phone]  PATIENT DATA      Employee Name: Diana Buenrostro      : 1955      #: [id number]      Work related injury: Yes  Employer at time of injury: [employer]  Employer contact & phone: [phone]  Employed elsewhere? No  Workers' Compensation Carrier/Managed Care Plan:  [employer]      Today's date: 18  Date of injury: 12/8/15  Date of first visit: 16      PROVIDER EVALUATION:   1. Diagnosis: Complex Regional Pain Syndrome affecting right upper extremity  2. Treatment: Medication, intermittent Stellate ganglion blocks for symptom control,  OT/PT refresher as needed to maintain range of motion and strength.   3. Medication: Cymbalta, lidoderm patches PRN, diclofenac gel PRN  4.  Hand therapy is completed. Continue daily exercises  5. Do not anticipate return to work at this time.   6. Next appointment: every six months       RESTRICTIONS: No work at this time.       Maximum Medical Improvement (Date): Unknown  Any Permanent Partial Disability? Deferred to future exam/consult.                  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

## 2018-05-23 NOTE — MR AVS SNAPSHOT
After Visit Summary   5/23/2018    Diana Buenrostro    MRN: [id number]           Patient Information     Date Of Birth          1955        Visit Information        Provider Department      5/23/2018 11:30 AM Mallory Mayo APRN CNP [facility]        Today's Diagnoses     YELENA (generalized anxiety disorder)        Complex regional pain syndrome type 1 of right upper extremity          Care Instructions    After Visit Instructions:     Thank you for coming to [facility] for your care. It is my goal to partner with you to help you reach your optimal state of health.     Continue daily self-care, identifying contributing factors, and monitoring variations in pain level. Continue to integrate self-care into your life.      1. OT/Hand therapies as needed  2. Schedule follow-up with SELWYN Ferguson, NPTalaC in 6 months  3. Procedures recommended: Stellate ganglion blocks as needed. Call to let us know when you want these ordered.    4. Medication recommendations: No changes.       SELWYN Romo, NPTalaC  [facility]    Contact information: [facility]    Please call if any side effects, questions, or concerns arise.    Nurse Triage line:  [phone]   Call this number with any questions or concerns. You may leave a detailed message anytime. Calls are typically returned Monday through Friday between 8 AM and 4:30 PM. We usually get back to you within 2 business days depending on the issue/request.    Scheduling number: [phone].  Call this number to schedule or change appointments.          Medication Refills Policy:    For non-narcotic medications, please your pharmacy directly to request a refill and the pharmacy will call the [facility] [place] for authorization. Please allow 3-4 days for these refills.    For narcotic refills, call the nurse triage line and leave a message  requesting your refill along with the name of the pharmacy that you use. Narcotic prescriptions will be mailed to your pharmacy or you may pick them up at the clinic.  Please call 7-10 days before your refill is due  The above policy allows adequate time so that you do not run out of medication.    No Show - Late Cancellation - Late Arrival Policy  We believe regular attendance is key to your success in our program.    Any time you are unable to keep your appointment we ask that you call us at  least 24 hours in advance to let us know. This will allow us to offer the appointment time to another patient. The following is our policy for missed appointments. This also applies to appointments cancelled with less than 24 hours notice.    You will receive a letter after missing your 1st and 2nd appointments without contacting the clinic before your scheduled appointment time.     After missing 3 appointments without calling first, we will cancel all of your future appointments at [facility].    At that point, you will not be able to resume services unless approved by your care team  We understand that unforseen circumstances arise, however, out of respect for all concerned and to provide this appointment to another patient, this policy will be enforced.    Please note that most follow up appointments are 30 minutes long. If you arrive late, your provider may not be able to see you for the entire 30 minutes. Please also note that if you arrive more than 15 minutes for any appointment, it may be rescheduled.                                     Follow-ups after your visit        Who to contact     If you have questions or need follow up information about today's clinic visit or your schedule please contact [facility] directly at [phone].  Normal or non-critical lab and imaging results will be communicated to you by MyChart, letter or phone within 4 business days after the clinic  "has received the results. If you do not hear from us within 7 days, please contact the clinic through [email or web address] or phone. If you have a critical or abnormal lab result, we will notify you by phone as soon as possible.  Submit refill requests through [email or web address] or call your pharmacy and they will forward the refill request to us. Please allow 3 business days for your refill to be completed.          Additional Information About Your Visit        [email or web address] Information     [email or web address] gives you secure access to your electronic health record. If you see a primary care provider, you can also send messages to your care team and make appointments. If you have questions, please call your primary care clinic.  If you do not have a primary care provider, please call [phone] and they will assist you.        Care EveryWhere ID     This is your Care EveryWhere ID. This could be used by other organizations to access your [place] medical records  [id number]        Your Vitals Were     Pulse Respirations Height Pulse Oximetry BMI (Body Mass Index)       97 16 1.753 m (5' 9\") 98% 24.07 kg/m2        Blood Pressure from Last 3 Encounters:   05/23/18 112/70   12/13/17 156/83   11/09/17 140/88    Weight from Last 3 Encounters:   05/23/18 73.9 kg (163 lb)   08/24/17 74.8 kg (165 lb)   08/01/17 75.8 kg (167 lb)              Today, you had the following     No orders found for display         Today's Medication Changes          These changes are accurate as of 5/23/18 12:16 PM.  If you have any questions, ask your nurse or doctor.               These medicines have changed or have updated prescriptions.        Dose/Directions    CYMBALTA 20 MG EC capsule   This may have changed:  additional instructions   Used for:  YELENA (generalized anxiety disorder), Complex regional pain syndrome type 1 of right upper extremity   Generic drug:  DULoxetine   Changed by:  Mallory Mayo APRN CNP        1 cap daily   Quantity:  30 capsule   Refills:  3 "                Primary Care Provider Office Phone # Fax #    SELWYN Cifuentes [facility] [phone] [address] [facility] [id number]        Equal Access to Services     SAMARA JUSTICE : Hadii aad ku haduteo Soomaali, waaxda luqadaha, qaybta kaalmada adeegyada, chacha silverin hayaajackson sanchezconchitarusty day. So [facility] [phone].    ATENCIÓN: Si habla español, tiene a de la torre disposición servicios gratuitos de asistencia lingüística. Llame al [phone].    We comply with applicable federal civil rights laws and [place] laws. We do not discriminate on the basis of race, color, national origin, age, disability, sex, sexual orientation, or gender identity.            Thank you!     Thank you for choosing [facility]  for your care. Our goal is always to provide you with excellent care. Hearing back from our patients is one way we can continue to improve our services. Please take a few minutes to complete the written survey that you may receive in the mail after your visit with us. Thank you!             Your Updated Medication List - Protect others around you: Learn how to safely use, store and throw away your medicines at www.disposemymeds.org.          This list is accurate as of 5/23/18 12:16 PM.  Always use your most recent med list.                   Brand Name Dispense Instructions for use Diagnosis    CYMBALTA 20 MG EC capsule   Generic drug:  DULoxetine     30 capsule    1 cap daily    YELENA (generalized anxiety disorder), Complex regional pain syndrome type 1 of right upper extremity       levothyroxine 50 MCG tablet    SYNTHROID/LEVOTHROID    30 tablet    Take 50 mcg on even days, and 25 mcg (1/2 tab) on odd days. (Needs follow-up appointment for this medication)    Hypothyroidism, unspecified type       simvastatin 20 MG tablet    ZOCOR    30 tablet    Take 1 tablet (20 mg) by mouth At Bedtime    Hyperlipidemia LDL goal <100       VITAMIN C PO      Take 500 mg by mouth daily

## 2018-05-23 NOTE — PATIENT INSTRUCTIONS
After Visit Instructions:     Thank you for coming to [facility] for your care. It is my goal to partner with you to help you reach your optimal state of health.     Continue daily self-care, identifying contributing factors, and monitoring variations in pain level. Continue to integrate self-care into your life.      1. OT/Hand therapies as needed  2. Schedule follow-up with SELWYN Ferguson NP-C in 6 months  3. Procedures recommended: Stellate ganglion blocks as needed. Call to let us know when you want these ordered.    4. Medication recommendations: No changes.       SELWYN Romo, NP-C  [facility]  [facility]    Contact information: [facility]    Please call if any side effects, questions, or concerns arise.    Nurse Triage line:  [phone]   Call this number with any questions or concerns. You may leave a detailed message anytime. Calls are typically returned Monday through Friday between 8 AM and 4:30 PM. We usually get back to you within 2 business days depending on the issue/request.    Scheduling number: [phone].  Call this number to schedule or change appointments.          Medication Refills Policy:    For non-narcotic medications, please your pharmacy directly to request a refill and the pharmacy will call the [facility] for authorization. Please allow 3-4 days for these refills.    For narcotic refills, call the nurse triage line and leave a message requesting your refill along with the name of the pharmacy that you use. Narcotic prescriptions will be mailed to your pharmacy or you may pick them up at the clinic.  Please call 7-10 days before your refill is due  The above policy allows adequate time so that you do not run out of medication.    No Show - Late Cancellation - Late Arrival Policy  We believe regular attendance is key to your success in our program.    Any time you are unable to keep your  appointment we ask that you call us at  least 24 hours in advance to let us know. This will allow us to offer the appointment time to another patient. The following is our policy for missed appointments. This also applies to appointments cancelled with less than 24 hours notice.    You will receive a letter after missing your 1st and 2nd appointments without contacting the clinic before your scheduled appointment time.     After missing 3 appointments without calling first, we will cancel all of your future appointments at [facility] [place].    At that point, you will not be able to resume services unless approved by your care team  We understand that unforseen circumstances arise, however, out of respect for all concerned and to provide this appointment to another patient, this policy will be enforced.    Please note that most follow up appointments are 30 minutes long. If you arrive late, your provider may not be able to see you for the entire 30 minutes. Please also note that if you arrive more than 15 minutes for any appointment, it may be rescheduled.

## 2018-05-23 NOTE — PROGRESS NOTES
[facility]    CHIEF COMPLAINT: Wrist pain     INTERVAL HISTORY:  Last seen on 11/9/17.        Recommendations/plan at the last visit included:  1.                  Schedule pain psychology visit with Sergio Juarez  2.                  Continue home exercise program  3. Schedule follow-up with SELWYN Ferguson NP-C in 4-8 weeks as needed.   4. Procedures recommended: Stellate ganglion blocks   5. Medication recommendations:                  1. Cymbalta (Duloxetine): Increase to 40 mg (2 capsules) for at least five days, then increase to 60 mg daily. Okay to split doses and take 40 mg in the morning and 20 mg at bedtime if you prefer.     Since her last visit, Diana DEAN Porter reports:  - [facility] evaluation was excellent. SCS was recommended but was denied by W/C. Really working hard on implementing resources given by [facility], diet, mindfulness, exercises, etc. Working on acceptance of her condition.   - Pain continues to worse with use.   - Block injection on 12/13/2017, helped with flare in conjunction with above work.     Pain Information:   Pain quality: Aching, Burning, Numb and Tender    Pain timing: worst in the morning     Pain rating: intensity ranges from 2/10 to 5/10, and averages 3/10 on a 0-10 scale.   Aggravating factors include: Rest, stop activities   Relieving factors include: using right hand, arm      Annual Controlled Substance Agreement/UDS due date: N/A    CURRENT RELEVANT PAIN MEDICATIONS:   Lidocaine 5% ointment, PRN  Diclofenac gel apply to affected area 3-4 times daily PRN  Cymbalta 20 mg daily     Patient is using the medication as prescribed:  YES  Is your medication helpful? YES   Medication side effects? no side effect    Previous Pain Relevant Medications: (H--helped; HI--Helped initially; SWH--Somewhat helpful; NH--No help; W--worse; SE--side effects; ?--Unsure if helpful)   NOTE: This medication information taken from patient's intake form, not medical records.    Opiates: Norco: H post op  NSAIDS: none: only has one kidney, donated kidney to her sister  Muscle Relaxants: none  Anti-migraine mediations: none  Anti-depressants: none  Sleep aids:none  Anti-convulsants: none   Topicals: none  Other medications not covered above: Tylenol: [facility]      Any illicit drug use: no  Any use of prescriptions other than how they were prescribed:no          [place] Board of Pharmacy Data Base Reviewed: YES; No concern for abuse or misuse of controlled medications based on this report.       SELF CARE:   How often do you practice SELF-CARE (relaxing, stretching, pacing, monitoring posture, taking mini-breaks) in a typical day:  Few times per day    THE 4 As OF OPIOID MAINTENANCE ANALGESIA    Analgesia: Is pain relief clinically significant? NA   Activity: Is patient functional and able to perform Activities of Daily Living? N/A   Adverse effects: Is patient free from adverse side effects from opiates? N/A   Adherence to Rx protocol: Is patient adhering to Controlled Substance Agreement and taking medications ONLY as ordered? N/A       Is Narcan prescribed for opiate use >50 MME daily? N/A          Medications:  Current Outpatient Prescriptions   Medication Sig Dispense Refill     Ascorbic Acid (VITAMIN C PO) Take 500 mg by mouth daily       DULoxetine (CYMBALTA) 20 MG EC capsule Take 40 mg for five days and then increase to 60 mg daily. 90 capsule 3     gabapentin (NEURONTIN) 300 MG capsule 600 mg TID (Patient not taking: Reported on 5/23/2018) 180 capsule 3     HYDROcodone-acetaminophen (NORCO) 5-325 MG per tablet 1/2 to 1 tablet daily as needed for moderate to severe pain. #20 tabs to last 30 days. (Patient not taking: Reported on 5/23/2018) 20 tablet 0     hydrOXYzine (ATARAX) 25 MG tablet Take 1-2 tablets (25-50 mg) by mouth every 6 hours as needed for itching (Patient not taking: Reported on 5/23/2018) 60 tablet 1     levothyroxine (SYNTHROID/LEVOTHROID) 50 MCG tablet Take 50 mcg on  "even days, and 25 mcg (1/2 tab) on odd days. (Needs follow-up appointment for this medication) 30 tablet 0     simvastatin (ZOCOR) 20 MG tablet Take 1 tablet (20 mg) by mouth At Bedtime (Patient not taking: Reported on 5/23/2018) 30 tablet 11       Review of Systems: A 10-point review of systems was negative, with the exception of chronic pain issues.      Social History: Reviewed; unchanged from previous consultation.      Family history: Reviewed; unchanged from previous consultation.     PHYSICAL EXAM    Vitals:    05/23/18 1124   BP: 112/70   BP Location: Left arm   Patient Position: Chair   Cuff Size: Adult Small   Pulse: 97   Resp: 16   SpO2: 98%   Weight: 73.9 kg (163 lb)   Height: 1.753 m (5' 9\")       Constitutional: healthy, alert and no distress  HEENT: Head atraumatic, normocephalic. Eyes without conjunctival injection or jaundice. Neck supple. No obvious neck masses.  Skin: No rash, lesions, or petechiae of exposed skin.   Extremities: Peripheral pulses intact. No clubbing, cyanosis, or edema. Affected extremity is cooler than opposite extremity. No coloration difference today. Diminished strength.  Psychiatric/mental status: Alert, without lethargy or stupor. Appropriate affect. Mood mildly anxious  Neurologic exam:  CN: Cranial nerves 2-12 are grossly normal.  Motor:  4/5 RUE, 5/5 LUE      Sensory exam:  Light touch: Painful in right upper extremity   RUE allodynia      [place] Criteria for diagnosis of Complex Regional Pain Syndrome:    Continuing pain disproportionate to inciting event: positive  Must report one symptom (subjective) in two of the following four categories:                        Sensory (hyperalgesia or allodynia): positive for hyperalgesia, allodynia                        Vasomotor (temperature asymmetry, skin color changes, skin color asymmetry): positive for temperature asymmetry, skin color change and assymmetry                        Pseudomotor/edema (edema, sweating or " sweating asymmetry):positvie for sweating asymmetry                         Motor/trophic (decreased range of motion, motor dysfunction, trophic changes in skin, hair or nails): positive for decreased ROM, motor dysfunction, changes in skin  Must display (objective) one sign in two or more of the following four categories:                        Sensory (hyperalgesia or allodynia): positive for Hyperalgesia and allodynia                        Vasomotor (temperature asymmetry, skin color changes, skin color asymmetry): positive for skin color changes, skin color asymmetery                        Pseudomotor/edema (edema, sweating or sweating asymmetry): negative                        Motor/trophic (decreased range of motion, motor dysfunction, trophic changes in skin, hair or nails): positive for decreased ROM, motor dysfuntion, trophic changes in skin     Patient does meet criteria for Complex Regional Pain Syndrome          DIRE Score for ongoing opioid management is calculated as follows:  Diagnosis = 2 pts (slowly progressive; moderate pain/objective findings)  Intractability = 3 pts (patient fully engaged but inadequate response to treatments)  Risk   Psych = 3 pts (no significant personality dysfunction/mental illness; good communication with clinic)   Chem Hlth = 3 pts (no history of chemical dependency; not drug-focused)  Reliability = 3 pts (highly reliable with meds, appointments, treatments)  Social = 3 pts (supportive family/close relationships; involved in work/school; no isolation)  (Psych + Chem hlth + Reliability + Social) = 17  Efficacy = 2 pts (moderate benefit/function; low med dose; too early/not tried meds)  DIRE Score = 19   7-13: likely NOT suitable candidate for long-term opioid analgesia  14-21: may be a suitable candidate for long-term opioid analgesia      DIAGNOSTIC TESTS:  Imaging Studies:   No new imaging      Assessment:   1. CRPS affecting the right upper extremity  2. Depression,  adjustment disorder    Diana is doing remarkably well incorporating skills and information learned at [facility]. She is apprehensive about pending MEGAN. Spinal cord stimulator was denied pending the MEGAN. Much discussion about not allowing her anxiety to prevent her from allowing the MEGAN so that the option of a SCS may be available in the future.     At this time the plan with continue with intermittent stellate ganglion blocks as needed for flares, intermittent OT/Hand therapies to maintain ROM and strength. OF note, her strength in the affected hand is somewhat improved compared to her last office visit 6 months ago. Data suggests that maximum recovery from CRPS can take up to 5 years from the time of injury. Her initial injury was December 2015 so we are at about 2.5 years.       Plan:    Diagnosis reviewed, treatment option addressed, and risk/benifits discussed.  Self-care instructions given.  I am recommending a multidisciplinary treatment plan to help this patient better manage pain.      1. OT/Hand therapies as needed  2. Schedule follow-up with SELWYN Ferguson NP-C in 6 months  3. Procedures recommended: Stellate ganglion blocks as needed. Call to let us know when you want these ordered.    4. Medication recommendations: No changes.       Total time spent face to face was 45 minutes and more than 50% of face to face time was spent in counseling and/or coordination of care regarding the diagnosis and recommendations above.      SELWYN Romo, NP-C   [facility]    Disclaimer: This note consists of symbols derived from keyboarding, dictation and/or voice recognition software. As a result, there may be errors in the script that have gone undetected. Please consider this when interpreting information found in this chart.

## 2018-05-31 DIAGNOSIS — E03.9 HYPOTHYROIDISM, UNSPECIFIED TYPE: ICD-10-CM

## 2018-05-31 LAB
T4 FREE SERPL-MCNC: 0.94 NG/DL (ref 0.76–1.46)
TSH SERPL DL<=0.005 MIU/L-ACNC: 4.48 MU/L (ref 0.4–4)

## 2018-05-31 PROCEDURE — 84443 ASSAY THYROID STIM HORMONE: CPT | Performed by: NURSE PRACTITIONER

## 2018-05-31 PROCEDURE — 36415 COLL VENOUS BLD VENIPUNCTURE: CPT | Performed by: NURSE PRACTITIONER

## 2018-05-31 PROCEDURE — 84439 ASSAY OF FREE THYROXINE: CPT | Performed by: NURSE PRACTITIONER

## 2018-06-05 ENCOUNTER — TELEPHONE (OUTPATIENT)
Dept: FAMILY MEDICINE | Facility: CLINIC | Age: 63
End: 2018-06-05

## 2018-06-05 NOTE — TELEPHONE ENCOUNTER
[facility].     Component      Latest Ref Rng & Units 5/31/2018   TSH      0.40 - 4.00 mU/L 4.48 (H)   T4 Free      0.76 - 1.46 ng/dL 0.94   Notes Recorded by Samantha Dhaliwal APRN CNP on 5/31/2018 at 2:36 PM  Please let the patient know that all labs are normal.    Patient reports weight gain and tiredness. Wondering if she needs change in levothyroxine.      Amanda FINNEY RN

## 2018-06-05 NOTE — TELEPHONE ENCOUNTER
Reason for Call:  Other call back    Detailed comments: Pt states her thyroid level was increased a little and she has had some syptoms, weight gain and tired. Should she increase her dose of synthroid? She tried the Whole 30 diet and didn't lose a pound.    Phone Number Patient can be reached at: Home number on file [phone] (home)    Best Time: any    Can we leave a detailed message on this number? YES    Call taken on 6/5/2018 at 10:16 AM by Jemima Alas

## 2018-06-06 NOTE — TELEPHONE ENCOUNTER
Recommend an OV or telephone visit to discuss this issue since other things can also cause these symptoms

## 2018-06-14 ENCOUNTER — TELEPHONE (OUTPATIENT)
Dept: FAMILY MEDICINE | Facility: CLINIC | Age: 63
End: 2018-06-14

## 2018-06-14 NOTE — TELEPHONE ENCOUNTER
6/14/2018    Attempt 1    Contacted patient in regards to scheduling VIP mammogram  Message on voicemail         Comments:       Outreach   nina umana

## 2018-06-19 NOTE — TELEPHONE ENCOUNTER
6/19/2018    Attempt 2    Contacted patient in regards to scheduling VIP mammogram  Message on voicemail     Patient is also due for - Preventive Health Screening     Comments:       Outreach   SB

## 2018-06-22 NOTE — TELEPHONE ENCOUNTER
6/22/2018    Attempt 3    Contacted patient in regards to scheduling VIP mammogram  Message on voicemail     Patient is also due for -      Comments:     Outreach   sb

## 2018-07-09 DIAGNOSIS — E03.9 HYPOTHYROIDISM, UNSPECIFIED TYPE: ICD-10-CM

## 2018-07-09 NOTE — TELEPHONE ENCOUNTER
"Requested Prescriptions   Pending Prescriptions Disp Refills     levothyroxine (SYNTHROID/LEVOTHROID) 50 MCG tablet  Last Written Prescription Date:  06/04/18  Last Fill Quantity: 30,  # refills: 0   Last office visit: 8/1/2017 with prescribing provider:  08/01/17   Future Office Visit:     30 tablet 0     Sig: Take 50 mcg on even days, and 25 mcg (1/2 tab) on odd days. (Needs follow-up appointment for this medication)    Thyroid Protocol Failed    7/9/2018  2:13 PM       Failed - Normal TSH on file in past 12 months    Recent Labs   Lab Test  05/31/18   1137   TSH  4.48*          Passed - Patient is 12 years or older       Passed - Recent (12 mo) or future (30 days) visit within the authorizing provider's specialty    Patient had office visit in the last 12 months or has a visit in the next 30 days with authorizing provider or within the authorizing provider's specialty.  See \"Patient Info\" tab in inbasket, or \"Choose Columns\" in Meds & Orders section of the refill encounter.           Passed - No active pregnancy on record    If patient is pregnant or has had a positive pregnancy test, please check TSH.         Passed - No positive pregnancy test in past 12 months    If patient is pregnant or has had a positive pregnancy test, please check TSH.            "

## 2018-07-11 RX ORDER — LEVOTHYROXINE SODIUM 50 UG/1
TABLET ORAL
Qty: 30 TABLET | Refills: 0 | Status: SHIPPED | OUTPATIENT
Start: 2018-07-11 | End: 2018-08-21

## 2018-08-17 ENCOUNTER — MYC MEDICAL ADVICE (OUTPATIENT)
Dept: PALLIATIVE MEDICINE | Facility: CLINIC | Age: 63
End: 2018-08-17

## 2018-08-21 ENCOUNTER — OFFICE VISIT (OUTPATIENT)
Dept: FAMILY MEDICINE | Facility: CLINIC | Age: 63
End: 2018-08-21
Payer: COMMERCIAL

## 2018-08-21 VITALS
SYSTOLIC BLOOD PRESSURE: 136 MMHG | HEART RATE: 75 BPM | TEMPERATURE: 96.5 F | BODY MASS INDEX: 25.03 KG/M2 | DIASTOLIC BLOOD PRESSURE: 74 MMHG | HEIGHT: 69 IN | OXYGEN SATURATION: 98 % | WEIGHT: 169 LBS

## 2018-08-21 DIAGNOSIS — E78.5 HYPERLIPIDEMIA LDL GOAL <100: ICD-10-CM

## 2018-08-21 DIAGNOSIS — Z12.11 COLON CANCER SCREENING: Primary | ICD-10-CM

## 2018-08-21 DIAGNOSIS — G90.511 COMPLEX REGIONAL PAIN SYNDROME TYPE 1 OF RIGHT UPPER EXTREMITY: ICD-10-CM

## 2018-08-21 DIAGNOSIS — E03.9 HYPOTHYROIDISM, UNSPECIFIED TYPE: ICD-10-CM

## 2018-08-21 DIAGNOSIS — F41.1 GAD (GENERALIZED ANXIETY DISORDER): ICD-10-CM

## 2018-08-21 LAB
CHOLEST SERPL-MCNC: 317 MG/DL
HDLC SERPL-MCNC: 68 MG/DL
LDLC SERPL CALC-MCNC: 234 MG/DL
NONHDLC SERPL-MCNC: 249 MG/DL
TRIGL SERPL-MCNC: 76 MG/DL

## 2018-08-21 PROCEDURE — 36415 COLL VENOUS BLD VENIPUNCTURE: CPT | Performed by: FAMILY MEDICINE

## 2018-08-21 PROCEDURE — 99214 OFFICE O/P EST MOD 30 MIN: CPT | Performed by: FAMILY MEDICINE

## 2018-08-21 PROCEDURE — 80061 LIPID PANEL: CPT | Performed by: FAMILY MEDICINE

## 2018-08-21 RX ORDER — LEVOTHYROXINE SODIUM 50 UG/1
TABLET ORAL
Qty: 30 TABLET | Refills: 11 | Status: SHIPPED | OUTPATIENT
Start: 2018-08-21 | End: 2019-04-02

## 2018-08-21 RX ORDER — DULOXETIN HYDROCHLORIDE 20 MG/1
CAPSULE, DELAYED RELEASE ORAL
Qty: 30 CAPSULE | Refills: 11 | Status: SHIPPED | OUTPATIENT
Start: 2018-08-21 | End: 2018-09-04

## 2018-08-21 RX ORDER — SIMVASTATIN 20 MG
20 TABLET ORAL AT BEDTIME
Qty: 30 TABLET | Refills: 11 | Status: SHIPPED | OUTPATIENT
Start: 2018-08-21 | End: 2019-09-14

## 2018-08-21 NOTE — MR AVS SNAPSHOT
After Visit Summary   8/21/2018    Diana Buenrostro    MRN: [id number]           Patient Information     Date Of Birth          1955        Visit Information        Provider Department      8/21/2018 7:20 AM Donnie Miller MD [facility]        Today's Diagnoses     Colon cancer screening    -  1    YELENA (generalized anxiety disorder)        Complex regional pain syndrome type 1 of right upper extremity        Hypothyroidism, unspecified type        Hyperlipidemia LDL goal <100          Care Instructions          Thank you for choosing [facility].  You may be receiving a survey in the mail from Megan Littlejohn[employer] regarding your visit today.  Please take a few minutes to complete and return the survey to let us know how we are doing.      If you have questions or concerns, please contact us via [employer] or you can contact your care team at [phone].    Our Clinic hours are:  Monday 6:40 am  to 7:00 pm  Tuesday -Friday 6:40 am to 5:00 pm    The [place] outpatient lab hours are:  Monday - Friday 6:10 am to 4:45 pm  Saturdays 7:00 am to 11:00 am  Appointments are required, call [phone]    If you have clinical questions after hours or would like to schedule an appointment,  call the clinic at [phone].        (F41.1) YELENA (generalized anxiety disorder)  Comment:   Plan: DULoxetine (CYMBALTA) 20 MG EC capsule        Use the med as discussed and refills are done for one year. Avoid stimulants.     (G90.511) Complex regional pain syndrome type 1 of right upper extremity  Comment:   Plan: DULoxetine (CYMBALTA) 20 MG EC capsule        See above.     (E03.9) Hypothyroidism, unspecified type  Comment:   Plan: levothyroxine (SYNTHROID/LEVOTHROID) 50 MCG         tablet, TSH, T4 FREE        We discussed the mild and possible symptoms of low thyroid, as in the weight gain and fatigue.   We will increase the dose of Levothyroxine to 50 mcg daily and recheck the two labs in 3-4 weeks,  before 9-21-18.   We will notify the labs and if you are doing well and the labs are normal then refills are done for one year.     (E78.5) Hyperlipidemia LDL goal <130  Comment:   Plan: simvastatin (ZOCOR) 20 MG tablet, Lipid panel         reflex to direct LDL Fasting        Stay on the lower chol diet and exercise daily. Do the fasting lipids today and the goal for the LDL is under 130.   If the LDL is over 130 then consider restarting the med. The last LDL on the med was 110.     (Z12.11) Colon cancer screening    Comment:   Plan: COLORECTAL SURGERY REFERRAL        Call [phone] for the scheduling.           Follow-ups after your visit        Additional Services     COLORECTAL SURGERY REFERRAL       Your provider has referred you to: SHER[facility]    Referral ReasonsColon Cancer  Special concerns None  This referral is Elective (week +)  It is OK to leave a message on patient's voicemail.    Please be aware that coverage of these services is subject to the terms and limitations of your health insurance plan.  Call member services at your health plan with any benefit or coverage questions.      Please bring the following to your appointment:  >>   Any x-rays, CTs or MRIs which have been performed.  Contact the facility where they were done to arrange for  prior to your scheduled appointment.  Any new CT, MRI or other procedures ordered by your specialist must be performed at a [place] facility or coordinated by your clinic's referral office.   >>   List of current medications  >>   This referral request   >>   Any documents/labs given to you for this referral                  Future tests that were ordered for you today     Open Future Orders        Priority Expected Expires Ordered    TSH Routine  9/21/2018 8/21/2018    T4 FREE Routine  9/21/2018 8/21/2018            Who to contact     If you have questions or need follow up information about today's clinic visit or your schedule please contact [place]  [facility] directly at [phone].  Normal or non-critical lab and imaging results will be communicated to you by Telebithart, letter or phone within 4 business days after the clinic has received the results. If you do not hear from us within 7 days, please contact the clinic through Telebithart or phone. If you have a critical or abnormal lab result, we will notify you by phone as soon as possible.  Submit refill requests through [employer] or call your pharmacy and they will forward the refill request to us. Please allow 3 business days for your refill to be completed.          Additional Information About Your Visit        [email or web address] Information     [employer] gives you secure access to your electronic health record. If you see a primary care provider, you can also send messages to your care team and make appointments. If you have questions, please call your primary care clinic.  If you do not have a primary care provider, please call [phone] and they will assist you.        Care EveryWhere ID     This is your Care EveryWhere ID. This could be used by other organizations to access your [place] medical records  [id number]        Your Vitals Were     Pulse Temperature Height Pulse Oximetry BMI (Body Mass Index)       75 96.5  F (35.8  C) (Tympanic) 5' 9\" (1.753 m) 98% 24.96 kg/m2        Blood Pressure from Last 3 Encounters:   08/21/18 136/74   05/23/18 112/70   12/13/17 156/83    Weight from Last 3 Encounters:   08/21/18 169 lb (76.7 kg)   05/23/18 163 lb (73.9 kg)   08/24/17 165 lb (74.8 kg)              We Performed the Following     COLORECTAL SURGERY REFERRAL     Lipid panel reflex to direct LDL Fasting          Today's Medication Changes          These changes are accurate as of 8/21/18  8:08 AM.  If you have any questions, ask your nurse or doctor.               These medicines have changed or have updated prescriptions.        Dose/Directions    levothyroxine 50 MCG tablet   Commonly known as:  " SYNTHROID/LEVOTHROID   This may have changed:  additional instructions   Used for:  Hypothyroidism, unspecified type   Changed by:  Donnie Miller MD        Take 50 mcg daily   Quantity:  30 tablet   Refills:  11            Where to get your medicines      These medications were sent to [facility] [facility] - - [place] - [id number]  [id number], [place] [address]    Hours:  [facility] [place] [facility] Phone:  [phone]     DULoxetine 20 MG EC capsule    levothyroxine 50 MCG tablet    simvastatin 20 MG tablet                Primary Care Provider Office Phone # Fax #    Samantha Dhaliwal, APRN [facility] [phone] [address] [address]        Equal Access to Services     SAMARA JUSTICE : Christine huio Sosilvia, waaxda luqadaha, qaybta kaalmada adeegyada, chacha carroll . [facility] [phone].    ATENCIÓN: Si habla español, tiene a de la torre disposición servicios gratuitos de asistencia lingüística. [facility] [phone].    We comply with applicable federal civil rights laws and [place] laws. We do not discriminate on the basis of race, color, national origin, age, disability, sex, sexual orientation, or gender identity.            Thank you!     Thank you for choosing [facility]  for your care. Our goal is always to provide you with excellent care. Hearing back from our patients is one way we can continue to improve our services. Please take a few minutes to complete the written survey that you may receive in the mail after your visit with us. Thank you!             Your Updated Medication List - Protect others around you: Learn how to safely use, store and throw away your medicines at www.disposemymeds.org.          This list is accurate as of 8/21/18  8:08 AM.  Always use your most recent med list.                   Brand Name Dispense Instructions for use Diagnosis    DULoxetine 20 MG EC capsule     CYMBALTA    30 capsule    1 cap daily    YELENA (generalized anxiety disorder), Complex regional pain syndrome type 1 of right upper extremity       levothyroxine 50 MCG tablet    SYNTHROID/LEVOTHROID    30 tablet    Take 50 mcg daily    Hypothyroidism, unspecified type       simvastatin 20 MG tablet    ZOCOR    30 tablet    Take 1 tablet (20 mg) by mouth At Bedtime    Hyperlipidemia LDL goal <100       VITAMIN C PO      Take 500 mg by mouth daily

## 2018-08-21 NOTE — PATIENT INSTRUCTIONS
Thank you for choosing [facility].  You may be receiving a survey in the mail from Megan Foss regarding your visit today.  Please take a few minutes to complete and return the survey to let us know how we are doing.      If you have questions or concerns, please contact us via [employer] or you can contact your care team at [phone].    Our Clinic hours are:  Monday 6:40 am  to 7:00 pm  Tuesday -Friday 6:40 am to 5:00 pm    The [place] outpatient lab hours are:  Monday - Friday 6:10 am to 4:45 pm  Saturdays 7:00 am to 11:00 am  Appointments are required, call [phone]    If you have clinical questions after hours or would like to schedule an appointment,  call the clinic at [phone].        (F41.1) YELENA (generalized anxiety disorder)  Comment:   Plan: DULoxetine (CYMBALTA) 20 MG EC capsule        Use the med as discussed and refills are done for one year. Avoid stimulants.     (G90.511) Complex regional pain syndrome type 1 of right upper extremity  Comment:   Plan: DULoxetine (CYMBALTA) 20 MG EC capsule        See above.     (E03.9) Hypothyroidism, unspecified type  Comment:   Plan: levothyroxine (SYNTHROID/LEVOTHROID) 50 MCG         tablet, TSH, T4 FREE        We discussed the mild and possible symptoms of low thyroid, as in the weight gain and fatigue.   We will increase the dose of Levothyroxine to 50 mcg daily and recheck the two labs in 3-4 weeks, before 9-21-18.   We will notify the labs and if you are doing well and the labs are normal then refills are done for one year.     (E78.5) Hyperlipidemia LDL goal <130  Comment:   Plan: simvastatin (ZOCOR) 20 MG tablet, Lipid panel         reflex to direct LDL Fasting        Stay on the lower chol diet and exercise daily. Do the fasting lipids today and the goal for the LDL is under 130.   If the LDL is over 130 then consider restarting the med. The last LDL on the med was 110.     (Z12.11) Colon cancer screening    Comment:   Plan: COLORECTAL  SURGERY REFERRAL        Call [phone] for the scheduling.

## 2018-08-21 NOTE — PROGRESS NOTES
SUBJECTIVE:   Diana Buenrostro is a 62 year old female who presents to clinic today for the following health issues:      Medication Followup of Thyroid     Taking Medication as prescribed: yes    Side Effects:  None    Medication Helping Symptoms:  Yes, sometimes she can feel some changes in how she feels.  Labs were done on 5-31-18 and are copied below.    Component      Latest Ref Rng & Units 5/31/2018   TSH      0.40 - 4.00 mU/L 4.48 (H)   T4 Free      0.76 - 1.46 ng/dL 0.94            Current Outpatient Prescriptions:      Ascorbic Acid (VITAMIN C PO), Take 500 mg by mouth daily, Disp: , Rfl:      DULoxetine (CYMBALTA) 20 MG EC capsule, 1 cap daily, Disp: 30 capsule, Rfl: 11     levothyroxine (SYNTHROID/LEVOTHROID) 50 MCG tablet, Take 50 mcg daily, Disp: 30 tablet, Rfl: 11     simvastatin (ZOCOR) 20 MG tablet, Take 1 tablet (20 mg) by mouth At Bedtime, Disp: 30 tablet, Rfl: 11     [DISCONTINUED] DULoxetine (CYMBALTA) 20 MG EC capsule, 1 cap daily, Disp: 30 capsule, Rfl: 3     [DISCONTINUED] levothyroxine (SYNTHROID/LEVOTHROID) 50 MCG tablet, Take 50 mcg on even days, and 25 mcg (1/2 tab) on odd days. (Needs follow-up appointment for this medication), Disp: 30 tablet, Rfl: 0     [DISCONTINUED] simvastatin (ZOCOR) 20 MG tablet, Take 1 tablet (20 mg) by mouth At Bedtime, Disp: 30 tablet, Rfl: 11    Patient Active Problem List   Diagnosis     Kidney donor     Chronic kidney disease (CKD)     Pulmonary nodule     HYPERLIPIDEMIA LDL GOAL <100     Sarcoidosis     Hypothyroidism     Advanced directives, counseling/discussion     Calculus of gallbladder with other cholecystitis, without mention of obstruction     Transient visual loss     Closed fracture of distal end of right radius     Complex regional pain syndrome type 2 of right upper extremity     Major depressive disorder, single episode, mild (H)     YELENA (generalized anxiety disorder)     Major depression in complete remission (H)     Blood pressure 136/74,  "pulse 75, temperature 96.5  F (35.8  C), temperature source Tympanic, height 5' 9\" (1.753 m), weight 169 lb (76.7 kg), SpO2 98 %, not currently breastfeeding.    Exam:  GENERAL APPEARANCE: healthy, alert and no distress  EYES: EOMI,  PERRL  HENT: ear canals and TM's normal and nose and mouth without ulcers or lesions  HENT: she has a 4 mm mucocele on the left side of the mouth.   NECK: no adenopathy, no asymmetry, masses, or scars and thyroid normal to palpation  RESP: lungs clear to auscultation - no rales, rhonchi or wheezes  CV: regular rates and rhythm, normal S1 S2, no S3 or S4 and no murmur, click or rub -  PSYCH: mentation appears normal and affect normal/bright      (F41.1) YELENA (generalized anxiety disorder)  Comment:   Plan: DULoxetine (CYMBALTA) 20 MG EC capsule        Use the med as discussed and refills are done for one year. Avoid stimulants.     (G90.511) Complex regional pain syndrome type 1 of right upper extremity  Comment:   Plan: DULoxetine (CYMBALTA) 20 MG EC capsule        See above.     (E03.9) Hypothyroidism, unspecified type  Comment:   Plan: levothyroxine (SYNTHROID/LEVOTHROID) 50 MCG         tablet, TSH, T4 FREE        We discussed the mild and possible symptoms of low thyroid, as in the weight gain and fatigue.   We will increase the dose of Levothyroxine to 50 mcg daily and recheck the two labs in 3-4 weeks, before 9-21-18.   We will notify the labs and if you are doing well and the labs are normal then refills are done for one year.     (E78.5) Hyperlipidemia LDL goal <130  Comment:   Plan: simvastatin (ZOCOR) 20 MG tablet, Lipid panel         reflex to direct LDL Fasting        Stay on the lower chol diet and exercise daily. Do the fasting lipids today and the goal for the LDL is under 130.   If the LDL is over 130 then consider restarting the med. The last LDL on the med was 110.     (Z12.11) Colon cancer screening    Comment:   Plan: COLORECTAL SURGERY REFERRAL        Call [phone] " for the scheduling.       Donnie Miller

## 2018-08-22 ASSESSMENT — PATIENT HEALTH QUESTIONNAIRE - PHQ9: SUM OF ALL RESPONSES TO PHQ QUESTIONS 1-9: 2

## 2018-09-04 DIAGNOSIS — F41.1 GAD (GENERALIZED ANXIETY DISORDER): ICD-10-CM

## 2018-09-04 DIAGNOSIS — G90.511 COMPLEX REGIONAL PAIN SYNDROME TYPE 1 OF RIGHT UPPER EXTREMITY: ICD-10-CM

## 2018-09-04 NOTE — TELEPHONE ENCOUNTER
Received fax request from [facility] requesting refill(s) for DULoxetine (CYMBALTA) 20 MG EC capsule    Last refilled on 08/13/2018    Pt last seen on 05/23/2018  Next appt scheduled for none    Jade Sanders [facility]      Will facilitate refill.

## 2018-09-06 RX ORDER — DULOXETIN HYDROCHLORIDE 20 MG/1
CAPSULE, DELAYED RELEASE ORAL
Qty: 30 CAPSULE | Refills: 11 | Status: SHIPPED | OUTPATIENT
Start: 2018-09-06 | End: 2018-12-06

## 2018-09-17 DIAGNOSIS — E03.9 HYPOTHYROIDISM, UNSPECIFIED TYPE: ICD-10-CM

## 2018-09-17 DIAGNOSIS — E78.5 HYPERLIPIDEMIA LDL GOAL <100: ICD-10-CM

## 2018-09-17 LAB
CHOLEST SERPL-MCNC: 169 MG/DL
HDLC SERPL-MCNC: 64 MG/DL
LDLC SERPL CALC-MCNC: 74 MG/DL
NONHDLC SERPL-MCNC: 105 MG/DL
T4 FREE SERPL-MCNC: 0.96 NG/DL (ref 0.76–1.46)
TRIGL SERPL-MCNC: 156 MG/DL
TSH SERPL DL<=0.005 MIU/L-ACNC: 4.13 MU/L (ref 0.4–4)

## 2018-09-17 PROCEDURE — 80061 LIPID PANEL: CPT | Performed by: FAMILY MEDICINE

## 2018-09-17 PROCEDURE — 84443 ASSAY THYROID STIM HORMONE: CPT | Performed by: FAMILY MEDICINE

## 2018-09-17 PROCEDURE — 36415 COLL VENOUS BLD VENIPUNCTURE: CPT | Performed by: FAMILY MEDICINE

## 2018-09-17 PROCEDURE — 84439 ASSAY OF FREE THYROXINE: CPT | Performed by: FAMILY MEDICINE

## 2018-10-02 ENCOUNTER — OFFICE VISIT (OUTPATIENT)
Dept: OTOLARYNGOLOGY | Facility: CLINIC | Age: 63
End: 2018-10-02
Payer: COMMERCIAL

## 2018-10-02 VITALS
HEIGHT: 69 IN | BODY MASS INDEX: 24.59 KG/M2 | WEIGHT: 166 LBS | SYSTOLIC BLOOD PRESSURE: 139 MMHG | TEMPERATURE: 97.9 F | HEART RATE: 74 BPM | DIASTOLIC BLOOD PRESSURE: 82 MMHG

## 2018-10-02 DIAGNOSIS — K13.0 LIP MASS: Primary | ICD-10-CM

## 2018-10-02 PROCEDURE — 88305 TISSUE EXAM BY PATHOLOGIST: CPT | Performed by: OTOLARYNGOLOGY

## 2018-10-02 PROCEDURE — 40812 EXCISE/REPAIR MOUTH LESION: CPT | Performed by: OTOLARYNGOLOGY

## 2018-10-02 PROCEDURE — 99203 OFFICE O/P NEW LOW 30 MIN: CPT | Mod: 25 | Performed by: OTOLARYNGOLOGY

## 2018-10-02 ASSESSMENT — PAIN SCALES - GENERAL: PAINLEVEL: NO PAIN (0)

## 2018-10-02 NOTE — LETTER
10/2/2018         RE: Diana Buenrostro  [address] [address]        Dear Colleague,    Thank you for referring your patient, Diana Buenrostro, to the [facility]. Please see a copy of my visit note below.        History of Present Illness - Diana Buenrostro is a 62 year old female who presents with a 3-4 month history of a mass on the left lower lip. She finds that she bites it frequently, and thinks it might be growing. She denies similar problems in the past. She denies any drainage from the lesion.    Past Medical History -   Patient Active Problem List   Diagnosis     Kidney donor     Chronic kidney disease (CKD)     Pulmonary nodule     HYPERLIPIDEMIA LDL GOAL <100     Sarcoidosis     Hypothyroidism     Advanced directives, counseling/discussion     Calculus of gallbladder with other cholecystitis, without mention of obstruction     Transient visual loss     Closed fracture of distal end of right radius     Complex regional pain syndrome type 2 of right upper extremity     Major depressive disorder, single episode, mild (H)     YELENA (generalized anxiety disorder)     Major depression in complete remission (H)       Current Medications -   Current Outpatient Prescriptions:      Ascorbic Acid (VITAMIN C PO), Take 500 mg by mouth daily, Disp: , Rfl:      DULoxetine (CYMBALTA) 20 MG EC capsule, 1 cap daily, Disp: 30 capsule, Rfl: 11     levothyroxine (SYNTHROID/LEVOTHROID) 50 MCG tablet, Take 50 mcg daily, Disp: 30 tablet, Rfl: 11     simvastatin (ZOCOR) 20 MG tablet, Take 1 tablet (20 mg) by mouth At Bedtime, Disp: 30 tablet, Rfl: 11    Allergies -   Allergies   Allergen Reactions     Codeine GI Disturbance     Serzone [Nefazodone Hydrochloride] Nausea and Vomiting     After 1 dose       Social History -   Social History     Social History     Marital status:      Spouse name: N/A     Number of children: N/A     Years of education: N/A     Social History Main  "Topics     Smoking status: Former Smoker     Years: 15.00     Quit date: 1/1/1995     Smokeless tobacco: Never Used     Alcohol use No     Drug use: No     Sexual activity: Yes     Birth control/ protection: Surgical      Comment: PARTIAL HYSTERECTOMY     Other Topics Concern     Parent/Sibling W/ Cabg, Mi Or Angioplasty Before 65f 55m? No     Social History Narrative       Family History -   Family History   Problem Relation Age of Onset     Diabetes Mother      Hypertension Mother      Arthritis Mother      HEART DISEASE Mother      Lipids Mother      Diabetes Father      Cancer Father      Diabetes Sister      Genitourinary Problems Sister      kidney     HEART DISEASE Sister      Lipids Sister      Cancer Paternal Grandmother 40     melanoma     Family History Negative No family hx of        Review of Systems - As per HPI and PMHx, otherwise 7 system review of the head and neck negative. 10+ system review negative.    Physical Exam  /82 (BP Location: Left arm, Patient Position: Sitting, Cuff Size: Adult Regular)  Pulse 74  Temp 97.9  F (36.6  C) (Oral)  Ht 1.753 m (5' 9\")  Wt 75.3 kg (166 lb)  BMI 24.51 kg/m2  General - The patient is well nourished and well developed, and appears to have good nutritional status.  Alert and oriented to person and place, answers questions and cooperates with examination appropriately.   Head and Face - Normocephalic and atraumatic, with no gross asymmetry noted of the contour of the facial features.  The facial nerve is intact, with strong symmetric movements.  Voice and Breathing - The patient was breathing comfortably without the use of accessory muscles. There was no wheezing, stridor, or stertor.  The patients voice was clear and strong, and had appropriate pitch and quality.  Ears - Bilateral pinna and EACs with normal appearing overlying skin. Tympanic membrane intact with good mobility on pneumatic otoscopy bilaterally. Bony landmarks of the ossicular chain are " normal. The tympanic membranes are normal in appearance. No retraction, perforation, or masses.  No fluid or purulence was seen in the external canal or the middle ear.   Eyes - Extraocular movements intact.  Sclera were not icteric or injected, conjunctiva were pink and moist.  Mouth - left lower lip with a 7mm firm purple mass, near the left commissure.  Throat - The walls of the oropharynx were smooth, pink, moist, symmetric, and had no lesions or ulcerations.  The tonsillar pillars and soft palate were symmetric.  The uvula was midline on elevation.  Neck - Normal midline excursion of the laryngotracheal complex during swallowing.  Full range of motion on passive movement.  Palpation of the occipital, submental, submandibular, internal jugular chain, and supraclavicular nodes did not demonstrate any abnormal lymph nodes or masses.  The carotid pulse was palpable bilaterally.  Palpation of the thyroid was soft and smooth, with no nodules or goiter appreciated.  The trachea was mobile and midline.  Nose - External contour is symmetric, no gross deflection or scars.  Nasal mucosa is pink and moist with no abnormal mucus.  The septum was midline and non-obstructive, turbinates of normal size and position.  No polyps, masses, or purulence noted on examination.    Procedure: Excision of left lower lip mass  Indication: Left lower lip mass  Technique: the left lower lip was injected with 1% Lidocaine with 1:100,000 epinephrine. I then cleaned the site with iodine. I made an incision over the mass, and dissected with an iris around the firm mass in this area. The mass was removed from the wound and placed in formalin. The wound was then closed with a single chromic suture.        Assessment - Diana Buenrostro is a 62 year old female with left lower lip mass, likely a phlebolith. I will contact her with pathology is available. I discussed wound care and pain control, and asked her to call if she has any issues in the  healing process.       Dr. Kuldeep Nina MD  Otolaryngology  [facility]        Again, thank you for allowing me to participate in the care of your patient.        Sincerely,        Kuldeep Nina MD

## 2018-10-02 NOTE — MR AVS SNAPSHOT
"              After Visit Summary   10/2/2018    Diana Buenrostro    MRN: [id number]           Patient Information     Date Of Birth          1955        Visit Information        Provider Department      10/2/2018 9:45 AM Kuldeep Nina MD [facility]        Today's Diagnoses     Lip mass    -  1      Care Instructions    Per Physician's instructions            Follow-ups after your visit        Who to contact     If you have questions or need follow up information about today's clinic visit or your schedule please contact [facility] directly at [phone].  Normal or non-critical lab and imaging results will be communicated to you by [employer], letter or phone within 4 business days after the clinic has received the results. If you do not hear from us within 7 days, please contact the clinic through [employer] or phone. If you have a critical or abnormal lab result, we will notify you by phone as soon as possible.  Submit refill requests through [employer] or call your pharmacy and they will forward the refill request to us. Please allow 3 business days for your refill to be completed.          Additional Information About Your Visit        MyChart Information     [employer] gives you secure access to your electronic health record. If you see a primary care provider, you can also send messages to your care team and make appointments. If you have questions, please call your primary care clinic.  If you do not have a primary care provider, please call [phone] and they will assist you.        Care EveryWhere ID     This is your Care EveryWhere ID. This could be used by other organizations to access your [place] medical records  [id number]        Your Vitals Were     Pulse Temperature Height BMI (Body Mass Index)          74 97.9  F (36.6  C) (Oral) 1.753 m (5' 9\") 24.51 kg/m2         Blood Pressure from Last 3 Encounters:   10/02/18 139/82   08/21/18 136/74   05/23/18 112/70    Weight " from Last 3 Encounters:   10/02/18 75.3 kg (166 lb)   08/21/18 76.7 kg (169 lb)   05/23/18 73.9 kg (163 lb)              We Performed the Following     Excision Mucocele     Surgical pathology exam        Primary Care Provider Office Phone # Fax #    Donnie Miller [phone] [address] [facility] [address]        Equal Access to Services     SAMARA JUSTICE : Hadii aad ku hadasho Soomaali, waaxda luqadaha, qaybta kaalmada adeegyada, waxay idiin hayaan radha sanchezconchitarusty carroll . So [facility] [phone].    ATENCIÓN: Si pramodla espisai, tiene a de la torre disposición servicios gratuitos de asistencia lingüística. Llame al [phone].    We comply with applicable federal civil rights laws and [place] laws. We do not discriminate on the basis of race, color, national origin, age, disability, sex, sexual orientation, or gender identity.            Thank you!     Thank you for choosing [facility]  for your care. Our goal is always to provide you with excellent care. Hearing back from our patients is one way we can continue to improve our services. Please take a few minutes to complete the written survey that you may receive in the mail after your visit with us. Thank you!             Your Updated Medication List - Protect others around you: Learn how to safely use, store and throw away your medicines at www.disposemymeds.org.          This list is accurate as of 10/2/18 11:02 AM.  Always use your most recent med list.                   Brand Name Dispense Instructions for use Diagnosis    DULoxetine 20 MG EC capsule    CYMBALTA    30 capsule    1 cap daily    YELENA (generalized anxiety disorder), Complex regional pain syndrome type 1 of right upper extremity       levothyroxine 50 MCG tablet    SYNTHROID/LEVOTHROID    30 tablet    Take 50 mcg daily    Hypothyroidism, unspecified type       simvastatin 20 MG tablet    ZOCOR    30 tablet    Take 1 tablet (20 mg) by mouth At Bedtime     Hyperlipidemia LDL goal <100       VITAMIN C PO      Take 500 mg by mouth daily

## 2018-10-02 NOTE — NURSING NOTE
"Initial /82 (BP Location: Left arm, Patient Position: Sitting, Cuff Size: Adult Regular)  Pulse 74  Temp 97.9  F (36.6  C) (Oral)  Ht 1.753 m (5' 9\")  Wt 75.3 kg (166 lb)  BMI 24.51 kg/m2 Estimated body mass index is 24.51 kg/(m^2) as calculated from the following:    Height as of this encounter: 1.753 m (5' 9\").    Weight as of this encounter: 75.3 kg (166 lb). .    Felisa Eli CMA    "

## 2018-10-02 NOTE — PROGRESS NOTES
History of Present Illness - Diana Buenrostro is a 62 year old female who presents with a 3-4 month history of a mass on the left lower lip. She finds that she bites it frequently, and thinks it might be growing. She denies similar problems in the past. She denies any drainage from the lesion.    Past Medical History -   Patient Active Problem List   Diagnosis     Kidney donor     Chronic kidney disease (CKD)     Pulmonary nodule     HYPERLIPIDEMIA LDL GOAL <100     Sarcoidosis     Hypothyroidism     Advanced directives, counseling/discussion     Calculus of gallbladder with other cholecystitis, without mention of obstruction     Transient visual loss     Closed fracture of distal end of right radius     Complex regional pain syndrome type 2 of right upper extremity     Major depressive disorder, single episode, mild (H)     YELENA (generalized anxiety disorder)     Major depression in complete remission (H)       Current Medications -   Current Outpatient Prescriptions:      Ascorbic Acid (VITAMIN C PO), Take 500 mg by mouth daily, Disp: , Rfl:      DULoxetine (CYMBALTA) 20 MG EC capsule, 1 cap daily, Disp: 30 capsule, Rfl: 11     levothyroxine (SYNTHROID/LEVOTHROID) 50 MCG tablet, Take 50 mcg daily, Disp: 30 tablet, Rfl: 11     simvastatin (ZOCOR) 20 MG tablet, Take 1 tablet (20 mg) by mouth At Bedtime, Disp: 30 tablet, Rfl: 11    Allergies -   Allergies   Allergen Reactions     Codeine GI Disturbance     Serzone [Nefazodone Hydrochloride] Nausea and Vomiting     After 1 dose       Social History -   Social History     Social History     Marital status:      Spouse name: N/A     Number of children: N/A     Years of education: N/A     Social History Main Topics     Smoking status: Former Smoker     Years: 15.00     Quit date: 1/1/1995     Smokeless tobacco: Never Used     Alcohol use No     Drug use: No     Sexual activity: Yes     Birth control/ protection: Surgical      Comment: PARTIAL HYSTERECTOMY  "    Other Topics Concern     Parent/Sibling W/ Cabg, Mi Or Angioplasty Before 65f 55m? No     Social History Narrative       Family History -   Family History   Problem Relation Age of Onset     Diabetes Mother      Hypertension Mother      Arthritis Mother      HEART DISEASE Mother      Lipids Mother      Diabetes Father      Cancer Father      Diabetes Sister      Genitourinary Problems Sister      kidney     HEART DISEASE Sister      Lipids Sister      Cancer Paternal Grandmother 40     melanoma     Family History Negative No family hx of        Review of Systems - As per HPI and PMHx, otherwise 7 system review of the head and neck negative. 10+ system review negative.    Physical Exam  /82 (BP Location: Left arm, Patient Position: Sitting, Cuff Size: Adult Regular)  Pulse 74  Temp 97.9  F (36.6  C) (Oral)  Ht 1.753 m (5' 9\")  Wt 75.3 kg (166 lb)  BMI 24.51 kg/m2  General - The patient is well nourished and well developed, and appears to have good nutritional status.  Alert and oriented to person and place, answers questions and cooperates with examination appropriately.   Head and Face - Normocephalic and atraumatic, with no gross asymmetry noted of the contour of the facial features.  The facial nerve is intact, with strong symmetric movements.  Voice and Breathing - The patient was breathing comfortably without the use of accessory muscles. There was no wheezing, stridor, or stertor.  The patients voice was clear and strong, and had appropriate pitch and quality.  Ears - Bilateral pinna and EACs with normal appearing overlying skin. Tympanic membrane intact with good mobility on pneumatic otoscopy bilaterally. Bony landmarks of the ossicular chain are normal. The tympanic membranes are normal in appearance. No retraction, perforation, or masses.  No fluid or purulence was seen in the external canal or the middle ear.   Eyes - Extraocular movements intact.  Sclera were not icteric or injected, " conjunctiva were pink and moist.  Mouth - left lower lip with a 7mm firm purple mass, near the left commissure.  Throat - The walls of the oropharynx were smooth, pink, moist, symmetric, and had no lesions or ulcerations.  The tonsillar pillars and soft palate were symmetric.  The uvula was midline on elevation.  Neck - Normal midline excursion of the laryngotracheal complex during swallowing.  Full range of motion on passive movement.  Palpation of the occipital, submental, submandibular, internal jugular chain, and supraclavicular nodes did not demonstrate any abnormal lymph nodes or masses.  The carotid pulse was palpable bilaterally.  Palpation of the thyroid was soft and smooth, with no nodules or goiter appreciated.  The trachea was mobile and midline.  Nose - External contour is symmetric, no gross deflection or scars.  Nasal mucosa is pink and moist with no abnormal mucus.  The septum was midline and non-obstructive, turbinates of normal size and position.  No polyps, masses, or purulence noted on examination.    Procedure: Excision of left lower lip mass  Indication: Left lower lip mass  Technique: the left lower lip was injected with 1% Lidocaine with 1:100,000 epinephrine. I then cleaned the site with iodine. I made an incision over the mass, and dissected with an iris around the firm mass in this area. The mass was removed from the wound and placed in formalin. The wound was then closed with a single chromic suture.        Assessment - Diana Buenrostro is a 62 year old female with left lower lip mass, likely a phlebolith. I will contact her with pathology is available. I discussed wound care and pain control, and asked her to call if she has any issues in the healing process.       Dr. Kuldeep Nina MD  Otolaryngology  [facility]

## 2018-10-03 LAB — COPATH REPORT: NORMAL

## 2018-10-29 ENCOUNTER — OFFICE VISIT (OUTPATIENT)
Dept: PALLIATIVE MEDICINE | Facility: CLINIC | Age: 63
End: 2018-10-29
Payer: OTHER MISCELLANEOUS

## 2018-10-29 VITALS
SYSTOLIC BLOOD PRESSURE: 112 MMHG | HEART RATE: 69 BPM | BODY MASS INDEX: 24.96 KG/M2 | DIASTOLIC BLOOD PRESSURE: 68 MMHG | RESPIRATION RATE: 16 BRPM | OXYGEN SATURATION: 99 % | WEIGHT: 169 LBS

## 2018-10-29 DIAGNOSIS — G90.511 COMPLEX REGIONAL PAIN SYNDROME TYPE 1 OF RIGHT UPPER EXTREMITY: Primary | ICD-10-CM

## 2018-10-29 PROCEDURE — 99214 OFFICE O/P EST MOD 30 MIN: CPT | Performed by: NURSE PRACTITIONER

## 2018-10-29 ASSESSMENT — PAIN SCALES - GENERAL: PAINLEVEL: MILD PAIN (3)

## 2018-10-29 NOTE — MR AVS SNAPSHOT
After Visit Summary   10/29/2018    Diana Buenrostro    MRN: [id number]           Patient Information     Date Of Birth          1955        Visit Information        Provider Department      10/29/2018 10:30 AM Mallory Mayo APRN CNP [facility]        Care Instructions    After Visit Instructions:     Thank you for coming to [facility] for your care. It is my goal to partner with you to help you reach your optimal state of health.     I am recommending multidisciplinary care at this time.  The focus of care will be to continue gradual rehabilitation and pain management with medication adjustments as needed.    Continue daily self-care, identifying contributing factors, and monitoring variations in pain level. Continue to integrate self-care into your life.      1. Schedule follow-up with SELWYN Ferguson, NPMIKE as needed  2. Let us know if you wish to have functional capacity testing.   3. Medication recommendations:   1. No change to medications at this time. Future refills of all medications should be requested from primary care provider.       SELWYN Romo, NP-C  [facility]    Contact information: [facility]  Clinic Number:  [phone]   Call this number with any questions about your care and for scheduling assistance. Calls are returned Monday through Friday between 8 AM and 4:30 PM. We usually get back to you within 2 business days depending on the issue/request.           Medication Refills Policy:    For non-narcotic medications, please your pharmacy directly to request a refill and the pharmacy will call the [facility] for authorization. Please allow 3-4 days for these refills.    For narcotic refills, call the nurse triage line and leave a message requesting your refill along with the name of the pharmacy that you use. Narcotic prescriptions will be  mailed to your pharmacy or you may pick them up at the clinic.  Please call 7-10 days before your refill is due  The above policy allows adequate time so that you do not run out of medication.    No Show - Late Cancellation - Late Arrival Policy  We believe regular attendance is key to your success in our program.    Any time you are unable to keep your appointment we ask that you call us at  least 24 hours in advance to let us know. This will allow us to offer the appointment time to another patient. The following is our policy for missed appointments. This also applies to appointments cancelled with less than 24 hours notice.    After missing 3 appointments without calling first, we will cancel all of your future appointments at [facility].    At that point, you will not be able to resume services unless approved by your care team  We understand that unforseen circumstances arise, however, out of respect for all concerned and to provide this appointment to another patient, this policy will be enforced.    Please note that most follow up appointments are 30 minutes long. If you arrive late, your provider may not be able to see you for the entire 30 minutes. Please also note that if you arrive more than 15 minutes for any appointment, it may be rescheduled.                                     Follow-ups after your visit        Who to contact     If you have questions or need follow up information about today's clinic visit or your schedule please contact [facility] directly at [phone].  Normal or non-critical lab and imaging results will be communicated to you by MyChart, letter or phone within 4 business days after the clinic has received the results. If you do not hear from us within 7 days, please contact the clinic through MyChart or phone. If you have a critical or abnormal lab result, we will notify you by phone as soon as possible.  Submit refill requests through  [employer] or call your pharmacy and they will forward the refill request to us. Please allow 3 business days for your refill to be completed.          Additional Information About Your Visit        MyChart Information     [employer] gives you secure access to your electronic health record. If you see a primary care provider, you can also send messages to your care team and make appointments. If you have questions, please call your primary care clinic.  If you do not have a primary care provider, please call [phone] and they will assist you.        Care EveryWhere ID     This is your Care EveryWhere ID. This could be used by other organizations to access your [place] medical records  [id number]        Your Vitals Were     Pulse Respirations Pulse Oximetry BMI (Body Mass Index)          69 16 99% 24.96 kg/m2         Blood Pressure from Last 3 Encounters:   10/29/18 112/68   10/02/18 139/82   08/21/18 136/74    Weight from Last 3 Encounters:   10/29/18 76.7 kg (169 lb)   10/02/18 75.3 kg (166 lb)   08/21/18 76.7 kg (169 lb)              Today, you had the following     No orders found for display       Primary Care Provider Office Phone # Fax #    Donnie Shorty Miller [phone] [address] [address]        Equal Access to Services     SAMARA JUSTICE AH: Hadii niharika richardson hadasho Soomaali, waaxda luqadaha, qaybta kaalmada adeegyada, chacha carroll . So [facility] [phone].    ATENCIÓN: Si habla español, tiene a de la torre disposición servicios gratuitos de asistencia lingüística. Llame al [phone].    We comply with applicable federal civil rights laws and [place] laws. We do not discriminate on the basis of race, color, national origin, age, disability, sex, sexual orientation, or gender identity.            Thank you!     Thank you for choosing [facility]  for your care. Our goal is always to provide you with excellent care. Hearing back from  our patients is one way we can continue to improve our services. Please take a few minutes to complete the written survey that you may receive in the mail after your visit with us. Thank you!             Your Updated Medication List - Protect others around you: Learn how to safely use, store and throw away your medicines at www.disposemymeds.org.          This list is accurate as of 10/29/18 10:59 AM.  Always use your most recent med list.                   Brand Name Dispense Instructions for use Diagnosis    DULoxetine 20 MG EC capsule    CYMBALTA    30 capsule    1 cap daily    YELENA (generalized anxiety disorder), Complex regional pain syndrome type 1 of right upper extremity       levothyroxine 50 MCG tablet    SYNTHROID/LEVOTHROID    30 tablet    Take 50 mcg daily    Hypothyroidism, unspecified type       simvastatin 20 MG tablet    ZOCOR    30 tablet    Take 1 tablet (20 mg) by mouth At Bedtime    Hyperlipidemia LDL goal <100       VITAMIN C PO      Take 500 mg by mouth daily

## 2018-10-29 NOTE — PROGRESS NOTES
[facility]    CHIEF COMPLAINT: Right UE pain     INTERVAL HISTORY:  Last seen on 5/23/18.        Recommendations/plan at the last visit included:     1. OT/Hand therapies as needed  2. Schedule follow-up with SELWYN Ferguson NP-C in 6 months  3. Procedures recommended: Stellate ganglion blocks as needed. Call to let us know when you want these ordered.    4. Medication recommendations:                No changes.     Since her last visit, Diana Buenrostro reports:  - Pain is fairly stable. She is able to manage pain flares. Uses occasional Norco.  Still has numbness and tingling right thumb and 5th digit.       Pain Information:   Pain quality: Aching, Burning and Numb    Pain rating: intensity ranges from 3/10 to 6/10, and averages 4/10 on a 0-10 scale.      Annual Controlled Substance Agreement/UDS due date: N/A     CURRENT RELEVANT PAIN MEDICATIONS:   Lidocaine 5% ointment, PRN  Diclofenac gel apply to affected area 3-4 times daily PRN  Cymbalta 20 mg daily      Patient is using the medication as prescribed:  YES  Is your medication helpful?               YES   Medication side effects? no side effect     Previous Pain Relevant Medications: (H--helped; HI--Helped initially; SWH--Somewhat helpful; NH--No help; W--worse; SE--side effects; ?--Unsure if helpful)   NOTE: This medication information taken from patient's intake form, not medical records.   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       SELF CARE:   How often do  you practice SELF-CARE (relaxing, stretching, pacing, monitoring posture, taking mini-breaks) in a typical day:  Few times per day     THE 4 As OF OPIOID MAINTENANCE ANALGESIA    Analgesia: Is pain relief clinically significant? NA   Activity: Is patient functional and able to perform Activities of Daily Living? N/A   Adverse effects: Is patient free from adverse side effects from opiates? N/A   Adherence to Rx protocol: Is patient adhering to Controlled Substance Agreement and taking medications ONLY as ordered? N/A         Is Narcan prescribed for opiate use >50 MME daily? N/A       Medications:  Current Outpatient Prescriptions   Medication Sig Dispense Refill     Ascorbic Acid (VITAMIN C PO) Take 500 mg by mouth daily       DULoxetine (CYMBALTA) 20 MG EC capsule 1 cap daily 30 capsule 11     levothyroxine (SYNTHROID/LEVOTHROID) 50 MCG tablet Take 50 mcg daily 30 tablet 11     simvastatin (ZOCOR) 20 MG tablet Take 1 tablet (20 mg) by mouth At Bedtime 30 tablet 11       Review of Systems: A 10-point review of systems was negative, with the exception of chronic pain issues.      Social History: Reviewed; unchanged from previous consultation.      Family history: Reviewed; unchanged from previous consultation.     PHYSICAL EXAM    There were no vitals filed for this visit.    Constitutional: healthy, alert and no distress  HEENT: Head atraumatic, normocephalic. Eyes without conjunctival injection or jaundice. Neck supple. No obvious neck masses.  Skin: No rash, lesions, or petechiae of exposed skin.   Extremities: Peripheral pulses intact. No clubbing, cyanosis, or edema. Affected extremity is cooler than opposite extremity. No coloration difference today. Diminished strength.  Psychiatric/mental status: Alert, without lethargy or stupor. Appropriate affect. Mood mildly anxious  Neurologic exam:  CN: Cranial nerves 2-12 are grossly normal.  Motor:  4/5 RUE, 5/5 LUE      Sensory exam:  Light touch: Painful in right  upper extremity   RUE allodynia      RUST Criteria for diagnosis of Complex Regional Pain Syndrome:    Continuing pain disproportionate to inciting event: positive  Must report one symptom (subjective) in two of the following four categories:                        Sensory (hyperalgesia or allodynia): positive for hyperalgesia, allodynia                        Vasomotor (temperature asymmetry, skin color changes, skin color asymmetry): positive for temperature asymmetry, skin color change and assymmetry                        Pseudomotor/edema (edema, sweating or sweating asymmetry):positvie for sweating asymmetry                         Motor/trophic (decreased range of motion, motor dysfunction, trophic changes in skin, hair or nails): positive for decreased ROM, motor dysfunction, changes in skin  Must display (objective) one sign in two or more of the following four categories:                        Sensory (hyperalgesia or allodynia): positive for Hyperalgesia and allodynia                        Vasomotor (temperature asymmetry, skin color changes, skin color asymmetry): positive for skin color changes, skin color asymmetery                        Pseudomotor/edema (edema, sweating or sweating asymmetry): negative                        Motor/trophic (decreased range of motion, motor dysfunction, trophic changes in skin, hair or nails): positive for decreased ROM, motor dysfuntion, trophic changes in skin      Patient does meet criteria for Complex Regional Pain Syndrome          DIRE Score for ongoing opioid management is calculated as follows:  Diagnosis = 2 pts (slowly progressive; moderate pain/objective findings)  Intractability = 3 pts (patient fully engaged but inadequate response to treatments)  Risk   Psych = 3 pts (no significant personality dysfunction/mental illness; good communication with clinic)   Chem Hlth = 3 pts (no history of chemical dependency; not drug-focused)  Reliability = 3 pts  (highly reliable with meds, appointments, treatments)  Social = 3 pts (supportive family/close relationships; involved in work/school; no isolation)  (Psych + Chem hlth + Reliability + Social) = 17  Efficacy = 2 pts (moderate benefit/function; low med dose; too early/not tried meds)  DIRE Score = 19   7-13: likely NOT suitable candidate for long-term opioid analgesia  14-21: may be a suitable candidate for long-term opioid analgesia      DIAGNOSTIC TESTS:  Imaging Studies:   No new imaging      Assessment:   1. CRPS affecting the right upper extremity  2. Depression, adjustment disorder    Diana returns for 6 mo follow up. She is not participating in the multidisciplinary pain management program. Occasional pain flares are able to be well controlled. She asks about additional Stellate Ganglion Blocks as needed. I do not think there is much to be gained from blocks at this time. If she has a pain flare that is not controlled, we would recommend OT/hand therapy again and may consider a stellate ganglion block at that time. She asks about being granted permanent disability. I do agree that she has plateaued in james recovery. I recommend functional capacity testing to determine permanent disability status. She is not interested in opiate pain medications at this time. Her last prescription of #20 tabs of Norco last 11 months. If PCP feels it is appropriate, she can certainly request a refill from them.       She does not have a compelling medical need to continue to participate in multidisciplinary pain management care at this time an dis discharged from [facility]. She is welcome to return in the future if her pain management needs change.      Plan:    Diagnosis reviewed, treatment option addressed, and risk/benifits discussed.  Self-care instructions given. Diana has completed multidisciplinary pain management care.     1. Schedule follow-up with SELWYN Ferguson, NP-C as needed  2. Let us know  if you wish to have functional capacity testing.   3.  Medication recommendations:   1. No change to medications at this time. Future refills of all medications should be requested from primary care provider.     Total time spent face to face was 30 minutes and more than 50% of face to face time was spent in counseling and/or coordination of care regarding the diagnosis and recommendations above.      SELWYN Romo, NP-C   [facility]    Disclaimer: This note consists of symbols derived from keyboarding, dictation and/or voice recognition software. As a result, there may be errors in the script that have gone undetected. Please consider this when interpreting information found in this chart.

## 2018-10-29 NOTE — PATIENT INSTRUCTIONS
After Visit Instructions:     Thank you for coming to [facility] for your care. It is my goal to partner with you to help you reach your optimal state of health.     I am recommending multidisciplinary care at this time.  The focus of care will be to continue gradual rehabilitation and pain management with medication adjustments as needed.    Continue daily self-care, identifying contributing factors, and monitoring variations in pain level. Continue to integrate self-care into your life.      1. Schedule follow-up with SELWYN Ferguson, NPMIKE as needed  2. Let us know if you wish to have functional capacity testing.   3. Medication recommendations:   1. No change to medications at this time. Future refills of all medications should be requested from primary care provider.       SELWYN Romo, NP-C  [facility]    Contact information: [facility]  Clinic Number:  [phone]   Call this number with any questions about your care and for scheduling assistance. Calls are returned Monday through Friday between 8 AM and 4:30 PM. We usually get back to you within 2 business days depending on the issue/request.           Medication Refills Policy:    For non-narcotic medications, please your pharmacy directly to request a refill and the pharmacy will call the [facility] for authorization. Please allow 3-4 days for these refills.    For narcotic refills, call the nurse triage line and leave a message requesting your refill along with the name of the pharmacy that you use. Narcotic prescriptions will be mailed to your pharmacy or you may pick them up at the clinic.  Please call 7-10 days before your refill is due  The above policy allows adequate time so that you do not run out of medication.    No Show - Late Cancellation - Late Arrival Policy  We believe regular attendance is key to your success in our program.    Any  time you are unable to keep your appointment we ask that you call us at  least 24 hours in advance to let us know. This will allow us to offer the appointment time to another patient. The following is our policy for missed appointments. This also applies to appointments cancelled with less than 24 hours notice.    After missing 3 appointments without calling first, we will cancel all of your future appointments at [facility].    At that point, you will not be able to resume services unless approved by your care team  We understand that unforseen circumstances arise, however, out of respect for all concerned and to provide this appointment to another patient, this policy will be enforced.    Please note that most follow up appointments are 30 minutes long. If you arrive late, your provider may not be able to see you for the entire 30 minutes. Please also note that if you arrive more than 15 minutes for any appointment, it may be rescheduled.

## 2018-10-29 NOTE — LETTER
[facility]  [address]  [place] [address] [phone]    2018    Diana Buenrostro  [address] [facility] [address]            REPORT OF WORK ABILITY  [facility]  [address]  [address]  [place] [address] [phone]  PATIENT DATA      Employee Name: Diana Buenrostro      : 1955      SS#: [id number]      Work related injury: Yes  Employer at time of injury: [employer]  Employer contact & phone: [phone]  Employed elsewhere? No  Workers' Compensation Carrier/Managed Care Plan:  [employer]      Today's date: 10/29/18  Date of injury: 12/8/15  Date of first visit: 16      PROVIDER EVALUATION:   1. Diagnosis: Complex Regional Pain Syndrome affecting right upper extremity  2. Treatment: Medication, intermittent Stellate ganglion blocks for symptom control,  OT/PT refresher as needed to maintain range of motion and strength.   3. Medication: Cymbalta, lidoderm patches PRN, diclofenac gel PRN  4.  Hand therapy is completed. Continue daily exercises  5. Do not anticipate return to work at this time.   6. Next appointment: every six months until discharged from [facility]      RESTRICTIONS: Diana Buenrostro has reached maximum recovery in my opinion. I recommend functional capacity testing to determine permanent percentage of injury.        Maximum Medical Improvement (Date): Unknown  Any Permanent Partial Disability? Deferred to future exam/consult. 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

## 2018-11-13 ENCOUNTER — MYC MEDICAL ADVICE (OUTPATIENT)
Dept: PALLIATIVE MEDICINE | Facility: CLINIC | Age: 63
End: 2018-11-13

## 2018-11-14 ENCOUNTER — MYC MEDICAL ADVICE (OUTPATIENT)
Dept: PALLIATIVE MEDICINE | Facility: CLINIC | Age: 63
End: 2018-11-14

## 2018-11-14 NOTE — TELEPHONE ENCOUNTER
This is being managed in a separate encounter.    ANGEL HassanN, RN  Care Coordinator  [facility]

## 2018-11-20 ENCOUNTER — MYC MEDICAL ADVICE (OUTPATIENT)
Dept: PALLIATIVE MEDICINE | Facility: CLINIC | Age: 63
End: 2018-11-20

## 2018-12-03 ENCOUNTER — TELEPHONE (OUTPATIENT)
Dept: PALLIATIVE MEDICINE | Facility: CLINIC | Age: 63
End: 2018-12-03

## 2018-12-03 NOTE — TELEPHONE ENCOUNTER
Marlyn from [facility] calling to follow up with you regarding the pt Diana Buenrostro. [phone].      Raad BARBA    [facility]

## 2018-12-04 NOTE — TELEPHONE ENCOUNTER
Marlyn sent a fax to Mallory asking for clarification on the appointment from 11/29/18. She needs to discuss the referral for functional copacity assessment. She would like to talk for 5 min.     [email or web address]    Or    [phone]    RIGOBERTO Hassan, RN  Care Coordinator  [facility]

## 2018-12-05 NOTE — TELEPHONE ENCOUNTER
Left  for Marlyn to call back over the lunch hour tomorrow.     SELWYN Romo, NP-C  [facility]

## 2018-12-06 NOTE — TELEPHONE ENCOUNTER
Spoke with Marlyn. I will need to see Diana Buenrostro again to discuss to discuss work ability at this time. I explained that I am not trained to assess permanent disability status and I cannot provided that rating.     SELWYN Romo, NP-C  [facility]

## 2019-01-12 ENCOUNTER — TELEPHONE (OUTPATIENT)
Dept: FAMILY MEDICINE | Facility: CLINIC | Age: 64
End: 2019-01-12

## 2019-01-12 NOTE — TELEPHONE ENCOUNTER
1/12/2019    Attempt 3    Contacted patient in regards to scheduling VIP mammogram  Message on voicemail     Patient is also due for -     Comments: Clinic made prior attempt(s)        Outreach   Martha Davis

## 2019-01-31 ENCOUNTER — TELEPHONE (OUTPATIENT)
Dept: FAMILY MEDICINE | Facility: CLINIC | Age: 64
End: 2019-01-31

## 2019-01-31 NOTE — TELEPHONE ENCOUNTER
Panel Management Review      Patient has the following on her problem list:       Composite cancer screening  Chart review shows that this patient is due/due soon for the following Pap Smear, Mammogram and Colonoscopy  Summary:    Patient is due/failing the following:   COLONOSCOPY, MAMMOGRAM and PAP    Action needed:   Patient needs office visit for pap.  Mammogram and colonoscopy. (can update phq over the phone or at clinic appt if patient is going to schedule)    Type of outreach:    Phone, left message for patient to call back.     Questions for provider review:    None                                                                                                                                    MONO Moreno MA       Chart routed to Care Team .

## 2019-01-31 NOTE — TELEPHONE ENCOUNTER
Panel Management Review      Patient has the following on her problem list:     Depression / Dysthymia review    Measure:  Needs PHQ-9 score of 4 or less during index window.  Administer PHQ-9 and if score is 5 or more, send encounter to provider for next steps.        PHQ-9 SCORE 2/28/2017 8/1/2017 8/21/2018   PHQ-9 Total Score - - -   PHQ-9 Total Score 9 3 2       If PHQ-9 recheck is 5 or more, route to provider for next steps.    Patient is due for:  PHQ9      Composite cancer screening  Chart review shows that this patient is due/due soon for the following Pap Smear, Mammogram and Colonoscopy  Summary:    Patient is due/failing the following:   COLONOSCOPY, MAMMOGRAM, PAP and PHQ9    Type of outreach:    Spoke with patient according to patient and chart, she is scheduled for mammogram and colonoscopy, She will call to schedule physical and update the phq-9 at that time.                                                                    Mk GARVIN CMA

## 2019-02-15 ENCOUNTER — HEALTH MAINTENANCE LETTER (OUTPATIENT)
Age: 64
End: 2019-02-15

## 2019-02-21 ENCOUNTER — ANESTHESIA EVENT (OUTPATIENT)
Dept: GASTROENTEROLOGY | Facility: CLINIC | Age: 64
End: 2019-02-21
Payer: COMMERCIAL

## 2019-02-21 ENCOUNTER — HOSPITAL ENCOUNTER (OUTPATIENT)
Dept: MAMMOGRAPHY | Facility: CLINIC | Age: 64
Discharge: HOME | End: 2019-02-21
Attending: FAMILY MEDICINE | Admitting: FAMILY MEDICINE
Payer: COMMERCIAL

## 2019-02-21 DIAGNOSIS — Z12.31 VISIT FOR SCREENING MAMMOGRAM: ICD-10-CM

## 2019-02-21 PROCEDURE — 77067 SCR MAMMO BI INCL CAD: CPT

## 2019-02-21 ASSESSMENT — LIFESTYLE VARIABLES: TOBACCO_USE: 1

## 2019-02-21 NOTE — ANESTHESIA PREPROCEDURE EVALUATION
Anesthesia Pre-Procedure Evaluation    Patient: Diana Buenrostro   MRN: [id number] : 1955          Preoperative Diagnosis: screening    Procedure(s):  COLONOSCOPY    Past Medical History:   Diagnosis Date     Anxiety state, unspecified      Chronic kidney disease (CKD) 2008     2006 cr 1.11 06 cr 0.94     Depressive disorder, not elsewhere classified      Insomnia, unspecified      Other specified anemias     History of anemia secondary to iron deficiency and menorrhagia     PONV (postoperative nausea and vomiting)      Thyroid disease      Unspecified allergic alveolitis and pneumonitis     atypical     Unspecified hearing loss     right      Urinary tract infection, site not specified      Past Surgical History:   Procedure Laterality Date     ARTHROSCOPY KNEE WITH MEDIAL MENISCECTOMY Right 2015    Procedure: ARTHROSCOPY KNEE WITH MEDIAL MENISCECTOMY;  Surgeon: Alberto Mason MD;  Location: [facility] OR     HYSTERECTOMY, VAGINAL  2000    Partial Hysterectomy for menorrhagia     LAPAROSCOPIC CHOLECYSTECTOMY  2014    Procedure: LAPAROSCOPIC CHOLECYSTECTOMY;  Laparoscopic vs Open Cholecystectomy;  Surgeon: Kamille Hilliard MD;  Location: [facility] OR     laparoscopic wedge resection of right lung       SURGICAL HISTORY OF -   2006    Laparoscopic and hand-assisted left donor ureteronephrectomy       Anesthesia Evaluation     . Pt has had prior anesthetic. Type: MAC and General    History of anesthetic complications   - PONV        ROS/MED HX    ENT/Pulmonary:     (+)tobacco use, Past use , . Other pulmonary disease pulmonary nodule.    Neurologic:       Cardiovascular:     (+) Dyslipidemia, ----. : . . . :. .       METS/Exercise Tolerance:     Hematologic:     (+) Anemia, -      Musculoskeletal:   (+) , , other musculoskeletal- sarcoidosis      GI/Hepatic:  - neg GI/hepatic ROS       Renal/Genitourinary:     (+) chronic renal disease, type: CRI, Other Renal/ Genitourinary,  "unilateral kidney r/t donor      Endo:     (+) thyroid problem hypothyroidism, Obesity, .      Psychiatric:     (+) psychiatric history anxiety and depression      Infectious Disease:  - neg infectious disease ROS       Malignancy:      - no malignancy   Other:                          Physical Exam  Normal systems: cardiovascular, pulmonary and dental    Airway   Mallampati: II  TM distance: >3 FB  Neck ROM: full    Dental     Cardiovascular       Pulmonary             Lab Results   Component Value Date    WBC 6.6 11/17/2016    HGB 14.7 11/17/2016    HCT 44.8 11/17/2016     11/17/2016     02/28/2017    POTASSIUM 4.4 02/28/2017    CHLORIDE 104 02/28/2017    CO2 30 02/28/2017    BUN 16 02/28/2017    CR 1.09 (H) 02/28/2017    GLC 90 02/28/2017    FAB 9.2 02/28/2017    ALBUMIN 3.9 11/17/2016    PROTTOTAL 7.1 11/17/2016    ALT 57 (H) 11/17/2016    AST 47 (H) 11/17/2016    ALKPHOS 58 11/17/2016    BILITOTAL 0.5 11/17/2016    LIPASE 194 04/02/2014    AMYLASE 101 04/02/2014    PTT 28 06/13/2006    INR 1.04 06/13/2006    TSH 4.13 (H) 09/17/2018    T4 0.96 09/17/2018       Preop Vitals  BP Readings from Last 3 Encounters:   10/29/18 112/68   10/02/18 139/82   08/21/18 136/74    Pulse Readings from Last 3 Encounters:   10/29/18 69   10/02/18 74   08/21/18 75      Resp Readings from Last 3 Encounters:   10/29/18 16   05/23/18 16   12/13/17 16    SpO2 Readings from Last 3 Encounters:   10/29/18 99%   08/21/18 98%   05/23/18 98%      Temp Readings from Last 1 Encounters:   10/02/18 36.6  C (97.9  F) (Oral)    Ht Readings from Last 1 Encounters:   10/02/18 1.753 m (5' 9\")      Wt Readings from Last 1 Encounters:   10/29/18 76.7 kg (169 lb)    Estimated body mass index is 24.96 kg/m  as calculated from the following:    Height as of 10/2/18: 1.753 m (5' 9\").    Weight as of 10/29/18: 76.7 kg (169 lb).       Anesthesia Plan      History & Physical Review  History and physical reviewed and following examination; no " interval change.    ASA Status:  2 .    NPO Status:  > 4 hours    Plan for MAC and General Reason for MAC:  Deep or markedly invasive procedure (G8)         Postoperative Care      Consents  Anesthetic plan, risks, benefits and alternatives discussed with:  Patient..                 SELWYN Robles CRNA

## 2019-02-25 NOTE — LETTER
REPORT OF WORK ABILITY    [facility]  [address]  [address]  [facility] [address] [phone]      PATIENT DATA         REPORT OF WORK ABILITY  [facility]  [address]  [address]  [facility] [id number] [phone]  PATIENT DATA      Employee Name: Diana Buenrostro      : 1955      SS#: [id number]      Work related injury: Yes  Employer at time of injury: [employer]  Employer contact & phone: [phone]  Employed elsewhere? No  Workers' Compensation Carrier/Managed Care Plan:  [employer]      Today's date: 2017  Date of injury: 12/8/15  Date of first visit: 16      PROVIDER EVALUATION: Please fill in as needed.  Please give copy to employee for employer.  1. Diagnosis: Complex Regional Pain Syndrome affecting right upper extremity  2. Treatment: Medication, nerve blocks (patient will call to discuss in 2 weeks),  OT/PT.  3. Medication: Gabapentin, lidoderm patches PRN, diclofenac gel PRN, Norco PRN, Methocarbamol PRN      NOTE: When ordering a medication, [place] Rules require Work Comp or WC on prescriptions.  4.  Hand therpay is completed. Continue daily exercises  5. Return to work date: Now:       RESTRICTIONS: 10 lb weight restriction, no push/pull, max 4 hrs per day, 3 days per week. No consecutive work days.        Maximum Medical Improvement (Date): Unknown  Any Permanent Partial Disability? Deferred to future exam/consult.      Medical Examiner:    SELWYN Ferguson, NP-C  [place] [facility]          License or registration: Advance Practice Registered Nurse       Next appointment: 8 weeks or sooner as needed.         
Home

## 2019-02-26 ENCOUNTER — ANESTHESIA (OUTPATIENT)
Dept: GASTROENTEROLOGY | Facility: CLINIC | Age: 64
End: 2019-02-26
Payer: COMMERCIAL

## 2019-02-26 ENCOUNTER — HOSPITAL ENCOUNTER (OUTPATIENT)
Facility: CLINIC | Age: 64
Discharge: HOME | End: 2019-02-26
Attending: SURGERY | Admitting: SURGERY
Payer: COMMERCIAL

## 2019-02-26 VITALS
HEIGHT: 69 IN | HEART RATE: 78 BPM | BODY MASS INDEX: 23.7 KG/M2 | OXYGEN SATURATION: 100 % | TEMPERATURE: 97.4 F | RESPIRATION RATE: 16 BRPM | DIASTOLIC BLOOD PRESSURE: 70 MMHG | WEIGHT: 160 LBS | SYSTOLIC BLOOD PRESSURE: 119 MMHG

## 2019-02-26 LAB — COLONOSCOPY: NORMAL

## 2019-02-26 PROCEDURE — 88305 TISSUE EXAM BY PATHOLOGIST: CPT | Mod: 26 | Performed by: SURGERY

## 2019-02-26 PROCEDURE — 88305 TISSUE EXAM BY PATHOLOGIST: CPT | Performed by: SURGERY

## 2019-02-26 PROCEDURE — 45385 COLONOSCOPY W/LESION REMOVAL: CPT | Performed by: SURGERY

## 2019-02-26 PROCEDURE — 25000125 ZZHC RX 250: Performed by: SURGERY

## 2019-02-26 PROCEDURE — 25800030 ZZH RX IP 258 OP 636: Performed by: SURGERY

## 2019-02-26 PROCEDURE — 37000009 ZZH ANESTHESIA TECHNICAL FEE, EACH ADDTL 15 MIN: Performed by: SURGERY

## 2019-02-26 PROCEDURE — 25000128 H RX IP 250 OP 636: Performed by: NURSE ANESTHETIST, CERTIFIED REGISTERED

## 2019-02-26 PROCEDURE — 37000008 ZZH ANESTHESIA TECHNICAL FEE, 1ST 30 MIN: Performed by: SURGERY

## 2019-02-26 PROCEDURE — 25000125 ZZHC RX 250: Performed by: NURSE ANESTHETIST, CERTIFIED REGISTERED

## 2019-02-26 PROCEDURE — 45385 COLONOSCOPY W/LESION REMOVAL: CPT | Mod: PT | Performed by: SURGERY

## 2019-02-26 RX ORDER — PROPOFOL 10 MG/ML
INJECTION, EMULSION INTRAVENOUS PRN
Status: DISCONTINUED | OUTPATIENT
Start: 2019-02-26 | End: 2019-02-26

## 2019-02-26 RX ORDER — PROPOFOL 10 MG/ML
INJECTION, EMULSION INTRAVENOUS CONTINUOUS PRN
Status: DISCONTINUED | OUTPATIENT
Start: 2019-02-26 | End: 2019-02-26

## 2019-02-26 RX ORDER — LIDOCAINE HYDROCHLORIDE 10 MG/ML
INJECTION, SOLUTION INFILTRATION; PERINEURAL PRN
Status: DISCONTINUED | OUTPATIENT
Start: 2019-02-26 | End: 2019-02-26

## 2019-02-26 RX ORDER — LIDOCAINE 40 MG/G
CREAM TOPICAL
Status: DISCONTINUED | OUTPATIENT
Start: 2019-02-26 | End: 2019-02-26 | Stop reason: HOSPADM

## 2019-02-26 RX ORDER — SODIUM CHLORIDE, SODIUM LACTATE, POTASSIUM CHLORIDE, CALCIUM CHLORIDE 600; 310; 30; 20 MG/100ML; MG/100ML; MG/100ML; MG/100ML
INJECTION, SOLUTION INTRAVENOUS CONTINUOUS
Status: DISCONTINUED | OUTPATIENT
Start: 2019-02-26 | End: 2019-02-26 | Stop reason: HOSPADM

## 2019-02-26 RX ORDER — ONDANSETRON 2 MG/ML
4 INJECTION INTRAMUSCULAR; INTRAVENOUS
Status: DISCONTINUED | OUTPATIENT
Start: 2019-02-26 | End: 2019-02-26 | Stop reason: HOSPADM

## 2019-02-26 RX ORDER — GLYCOPYRROLATE 0.2 MG/ML
INJECTION, SOLUTION INTRAMUSCULAR; INTRAVENOUS PRN
Status: DISCONTINUED | OUTPATIENT
Start: 2019-02-26 | End: 2019-02-26

## 2019-02-26 RX ADMIN — PROPOFOL 150 MCG/KG/MIN: 10 INJECTION, EMULSION INTRAVENOUS at 10:27

## 2019-02-26 RX ADMIN — GLYCOPYRROLATE 0.2 MG: 0.2 INJECTION, SOLUTION INTRAMUSCULAR; INTRAVENOUS at 10:29

## 2019-02-26 RX ADMIN — PROPOFOL 50 MG: 10 INJECTION, EMULSION INTRAVENOUS at 10:32

## 2019-02-26 RX ADMIN — PROPOFOL 100 MG: 10 INJECTION, EMULSION INTRAVENOUS at 10:27

## 2019-02-26 RX ADMIN — LIDOCAINE HYDROCHLORIDE 50 MG: 10 INJECTION, SOLUTION INFILTRATION; PERINEURAL at 10:27

## 2019-02-26 RX ADMIN — LIDOCAINE HYDROCHLORIDE 1 ML: 10 INJECTION, SOLUTION EPIDURAL; INFILTRATION; INTRACAUDAL; PERINEURAL at 09:24

## 2019-02-26 RX ADMIN — SODIUM CHLORIDE, POTASSIUM CHLORIDE, SODIUM LACTATE AND CALCIUM CHLORIDE: 600; 310; 30; 20 INJECTION, SOLUTION INTRAVENOUS at 09:24

## 2019-02-26 ASSESSMENT — MIFFLIN-ST. JEOR: SCORE: 1350.14

## 2019-02-26 NOTE — ANESTHESIA CARE TRANSFER NOTE
Patient: Diana Buenrostro    Procedure(s):  COMBINED COLONOSCOPY, REMOVE TUMOR/POLYP/LESION BY SNARE    Diagnosis: screening  Diagnosis Additional Information: No value filed.    Anesthesia Type:   No value filed.     Note:  Airway :Room Air  Patient transferred to:Phase II  Comments: Patient's VSS. Spontaneous respirations. Patient awake and oriented. IV patent. Report to RN.Handoff Report: Identifed the Patient, Identified the Reponsible Provider, Reviewed the pertinent medical history, Discussed the surgical course, Reviewed Intra-OP anesthesia mangement and issues during anesthesia, Set expectations for post-procedure period and Allowed opportunity for questions and acknowledgement of understanding      Vitals: (Last set prior to Anesthesia Care Transfer)    CRNA VITALS  2/26/2019 1030 - 2/26/2019 1100      2/26/2019             Resp Rate (observed):  16    EKG:  NSR                Electronically Signed By: SELWYN Love CRNA  February 26, 2019  11:00 AM

## 2019-02-26 NOTE — BRIEF OP NOTE
[facility]   Brief Operative Note    Pre-operative diagnosis: screening   Post-operative diagnosis polyps X 2, otherwise normal     Procedure: Procedure(s):  COMBINED COLONOSCOPY, REMOVE TUMOR/POLYP/LESION BY SNARE   Surgeon(s): Surgeon(s) and Role:     * Donnie Terrazas MD - Primary   Estimated blood loss: * No values recorded between 2/26/2019 12:00 AM and 2/26/2019 10:56 AM *    Specimens: ID Type Source Tests Collected by Time Destination   A :  Polyp Large Intestine, Hepatic Flexure SURGICAL PATHOLOGY EXAM Donnie Terrazas MD 2/26/2019 10:44 AM       Findings: 1. Two polyps near hepatic flexure - resected  2. Colon otherwise normal

## 2019-02-26 NOTE — ANESTHESIA POSTPROCEDURE EVALUATION
Patient: Diana Buenrostro    Procedure(s):  COMBINED COLONOSCOPY, REMOVE TUMOR/POLYP/LESION BY SNARE    Diagnosis:screening  Diagnosis Additional Information: No value filed.    Anesthesia Type:  No value filed.    Note:  Anesthesia Post Evaluation    Patient location during evaluation: Phase 2 and Bedside  Patient participation: Able to fully participate in evaluation  Level of consciousness: awake and alert  Pain management: adequate  Airway patency: patent  Cardiovascular status: acceptable  Respiratory status: acceptable  Hydration status: acceptable  PONV: none     Anesthetic complications: None          Last vitals:  Vitals:    02/26/19 0907 02/26/19 1104 02/26/19 1120   BP: 126/84 104/67 118/73   Pulse: 80 70 68   Resp: 18 16 16   Temp: 36.3  C (97.4  F)     SpO2: 98% 100% 100%         Electronically Signed By: SELWYN Love CRNA  February 26, 2019  11:25 AM

## 2019-02-26 NOTE — H&P
63 year old year old female here for colonoscopy for screening.    Patient Active Problem List   Diagnosis     Kidney donor     Chronic kidney disease (CKD)     Pulmonary nodule     HYPERLIPIDEMIA LDL GOAL <100     Sarcoidosis     Hypothyroidism     Advanced directives, counseling/discussion     Calculus of gallbladder with other cholecystitis, without mention of obstruction     Transient visual loss     Closed fracture of distal end of right radius     Complex regional pain syndrome type 2 of right upper extremity     Major depressive disorder, single episode, mild (H)     YELENA (generalized anxiety disorder)     Major depression in complete remission (H)       Past Medical History:   Diagnosis Date     Anxiety state, unspecified      Chronic kidney disease (CKD) 11/28/2008 2006 cr 1.11 6/13/06 cr 0.94     Depressive disorder, not elsewhere classified      Insomnia, unspecified      Other specified anemias     History of anemia secondary to iron deficiency and menorrhagia     PONV (postoperative nausea and vomiting)      Thyroid disease      Unspecified allergic alveolitis and pneumonitis     atypical     Unspecified hearing loss     right      Urinary tract infection, site not specified        Past Surgical History:   Procedure Laterality Date     ARTHROSCOPY KNEE WITH MEDIAL MENISCECTOMY Right 1/22/2015    Procedure: ARTHROSCOPY KNEE WITH MEDIAL MENISCECTOMY;  Surgeon: Alberto Mason MD;  Location: [facility]     HYSTERECTOMY, VAGINAL  2000    Partial Hysterectomy for menorrhagia     LAPAROSCOPIC CHOLECYSTECTOMY  4/9/2014    Procedure: LAPAROSCOPIC CHOLECYSTECTOMY;  Laparoscopic vs Open Cholecystectomy;  Surgeon: Kamille Hilliard MD;  Location: [facility] OR     laparoscopic wedge resection of right lung  2010     SURGICAL HISTORY OF -   6/14/2006    Laparoscopic and hand-assisted left donor ureteronephrectomy       @[facility]@    No current outpatient medications on file.       Allergies   Allergen Reactions      Codeine GI Disturbance     Serzone [Nefazodone Hydrochloride] Nausea and Vomiting     After 1 dose       Pt reports that she quit smoking about 24 years ago. She quit after 15.00 years of use. she has never used smokeless tobacco. She reports that she does not drink alcohol or use drugs.    Exam:  There were no vitals taken for this visit.    Awake, Alert OX3  Lungs - CTA bilaterally  CV - RRR, no murmurs, distal pulses intact  Abd - soft, non-distended, non-tender, +BS  Extr - No cyanosis or edema    A/P 63 year old year old female in need of colonoscopy for screening. Risks, benefits, alternatives, and complications were discussed including the possibility of perforation and the patient agreed to proceed    Donnie Terrazas MD

## 2019-02-28 PROBLEM — D36.9 TUBULOVILLOUS ADENOMA: Status: ACTIVE | Noted: 2019-02-28

## 2019-02-28 LAB — COPATH REPORT: NORMAL

## 2019-04-02 DIAGNOSIS — E03.9 HYPOTHYROIDISM, UNSPECIFIED TYPE: ICD-10-CM

## 2019-04-02 NOTE — TELEPHONE ENCOUNTER
"Requested Prescriptions   Pending Prescriptions Disp Refills     levothyroxine (SYNTHROID/LEVOTHROID) 50 MCG tablet [Pharmacy Med Name: LEVOTHYROXINE 50 MCG TABLET] 30 tablet 7    Last Written Prescription Date:  8/21/18  Last Fill Quantity: 30 tab,  # refills: 11   Last office visit: 8/21/2018 with prescribing provider:  Donnie Miller     Future Office Visit:     Sig: TAKE 1 TABLET BY MOUTH EVERY MORNING ON AN EMPTY STOMACH    Thyroid Protocol Failed - 4/2/2019 10:37 AM       Failed - Normal TSH on file in past 12 months    Recent Labs   Lab Test 09/17/18  0652   TSH 4.13*             Passed - Patient is 12 years or older       Passed - Recent (12 mo) or future (30 days) visit within the authorizing provider's specialty    Patient had office visit in the last 12 months or has a visit in the next 30 days with authorizing provider or within the authorizing provider's specialty.  See \"Patient Info\" tab in inbasket, or \"Choose Columns\" in Meds & Orders section of the refill encounter.             Passed - Medication is active on med list       Passed - No active pregnancy on record    If patient is pregnant or has had a positive pregnancy test, please check TSH.         Passed - No positive pregnancy test in past 12 months    If patient is pregnant or has had a positive pregnancy test, please check TSH.            "

## 2019-04-03 RX ORDER — LEVOTHYROXINE SODIUM 50 UG/1
TABLET ORAL
Qty: 30 TABLET | Refills: 7 | Status: SHIPPED | OUTPATIENT
Start: 2019-04-03 | End: 2020-02-26

## 2019-04-03 NOTE — TELEPHONE ENCOUNTER
Routing refill request to provider for review/approval because:  Labs out of range:  TSH    Carol Ann Paez, RN

## 2019-07-25 ENCOUNTER — OFFICE VISIT (OUTPATIENT)
Dept: FAMILY MEDICINE | Facility: CLINIC | Age: 64
End: 2019-07-25
Payer: COMMERCIAL

## 2019-07-25 VITALS
TEMPERATURE: 97.9 F | BODY MASS INDEX: 25.7 KG/M2 | RESPIRATION RATE: 16 BRPM | WEIGHT: 173.5 LBS | HEART RATE: 77 BPM | HEIGHT: 69 IN | SYSTOLIC BLOOD PRESSURE: 139 MMHG | DIASTOLIC BLOOD PRESSURE: 88 MMHG

## 2019-07-25 DIAGNOSIS — R10.13 ABDOMINAL PAIN, EPIGASTRIC: Primary | ICD-10-CM

## 2019-07-25 PROCEDURE — 99213 OFFICE O/P EST LOW 20 MIN: CPT | Performed by: PHYSICIAN ASSISTANT

## 2019-07-25 ASSESSMENT — PAIN SCALES - GENERAL: PAINLEVEL: MILD PAIN (3)

## 2019-07-25 ASSESSMENT — MIFFLIN-ST. JEOR: SCORE: 1411.37

## 2019-07-25 NOTE — PROGRESS NOTES
SUBJECTIVE:                                                    Diana Buenrostro is a 63 year old female who presents to clinic today for the following health issues:    ABDOMINAL   PAIN     Onset: a few months but within the past few weeks.    Description:   Character: Sharp and Dull ache  Location: epigastric region  Radiation: None    Intensity: moderate to severe    Progression of Symptoms:  worsening    Accompanying Signs & Symptoms:  Fever/Chills?: no   Gas/Bloating: YES  Nausea: YES- when she eats  Vomitting: no   Diarrhea?: YES  Constipation:YES  Dysuria or Hematuria: no    History:   Trauma: no   Previous similar pain: no    Previous tests done: none    Precipitating factors:   Does the pain change with:     Food: YES- sometimes makes the pain a little worse.      BM: no     Urination: no     Alleviating factors:  Not moving until the sharp pain goes away.    Therapies Tried and outcome: none    LMP:  not applicable       **She is worried about a hernia.   Problem list and histories reviewed & adjusted, as indicated.  Additional history:     Hard for her to describe as it is not a severe pain all the time but has noticed it for several months now and more in the last few weeks  Initially just noticed it with certain positions - would be down on the floor with her grandchildren lifting them up in the air with her feet, etc... And would feel the pain. Also had been attending yoga classes with her daughter and noticed in certain positions or yoga poses the pain would return   Would need to change positions and wait until it subsided    Has also noticed that she seems to have some early satiety and will get bloated easily at times after eating  Not more gassy than normal  Stools can be back and forth but doesn't feel they are actually that abnormal  Hasn't really noticed a strong pattern with food     Did donate a kidney over 13 years ago so has an incision on her abdomen near the area where she feels the  "discomfort    BP Readings from Last 3 Encounters:   07/25/19 139/88   02/26/19 119/70   10/29/18 112/68    Wt Readings from Last 3 Encounters:   07/25/19 78.7 kg (173 lb 8 oz)   02/26/19 72.6 kg (160 lb)   10/29/18 76.7 kg (169 lb)                    ROS:  Remainder of ROS obtained and found to be negative other than that which was documented above      OBJECTIVE:                                                    /88 (BP Location: Right arm, Patient Position: Sitting, Cuff Size: Adult Regular)   Pulse 77   Temp 97.9  F (36.6  C) (Tympanic)   Resp 16   Ht 1.753 m (5' 9\")   Wt 78.7 kg (173 lb 8 oz)   BMI 25.62 kg/m   Body mass index is 25.62 kg/m .   GENERAL:: healthy, alert and no distress  RESP: lungs clear to auscultation - no rales, no rhonchi, no wheezes  CV: regular rates and rhythm, normal S1 S2, no S3 or S4 and no murmur, no click or rub -  ABDOMEN: BS normal. Soft, nontender. Midline supraumbilical incision with tenderness noted over the superior portion of the scar with ? Soft, small bulge on increased abdominal pressure. Tenderness does extend down to the right side but on reboudn and negative Sanders's       ASSESSMENT/PLAN:                                                      (R10.13) Abdominal pain, epigastric  (primary encounter diagnosis)  Comment: abdominal wall/muscle strain vs hernia vs inner abdominal issue. Lack of symptoms with food and clinical picture make GERD seem less likely although will consider if other work up negative. Possibly abdominal wall strain given when she notices the pain but also suspicious for small hernia - does have prior incision where weakening of wall more likely. Will consult with surgery to see if they note any abnormalities and see if they feel any imaging would be helpful. If they don't feel it is surgical/hernia and referred back here would consider imaging given other symptoms of early satiety and bloating  Plan: GENERAL SURG ADULT REFERRAL        "     Winsome Cast PA-C    [facility]

## 2019-07-25 NOTE — PATIENT INSTRUCTIONS
First step is to rule out an incisional hernia that may be the cause of your symptoms    Call [facility] [phone] to schedule this appointment

## 2019-08-05 ENCOUNTER — OFFICE VISIT (OUTPATIENT)
Dept: SURGERY | Facility: CLINIC | Age: 64
End: 2019-08-05
Payer: COMMERCIAL

## 2019-08-05 VITALS
DIASTOLIC BLOOD PRESSURE: 93 MMHG | HEART RATE: 77 BPM | RESPIRATION RATE: 16 BRPM | SYSTOLIC BLOOD PRESSURE: 147 MMHG | TEMPERATURE: 96.9 F

## 2019-08-05 DIAGNOSIS — G89.29 CHRONIC EPIGASTRIC PAIN: Primary | ICD-10-CM

## 2019-08-05 DIAGNOSIS — R10.13 CHRONIC EPIGASTRIC PAIN: Primary | ICD-10-CM

## 2019-08-05 PROCEDURE — 99214 OFFICE O/P EST MOD 30 MIN: CPT | Performed by: SURGERY

## 2019-08-05 NOTE — NURSING NOTE
"Chief Complaint   Patient presents with     Consult     Abdominal Pain, Possible Hernia        Initial BP (!) 147/78 (BP Location: Right arm, Patient Position: Chair, Cuff Size: Adult Regular)   Pulse 77   Temp 96.9  F (36.1  C) (Tympanic)   Resp 16  Estimated body mass index is 25.62 kg/m  as calculated from the following:    Height as of 7/25/19: 1.753 m (5' 9\").    Weight as of 7/25/19: 78.7 kg (173 lb 8 oz).  BP completed using cuff size: regular   Medications and allergies reviewed.      Mabel BARBA CMA     "

## 2019-08-05 NOTE — PATIENT INSTRUCTIONS
Per physician instructions.    If you have questions or concerns on any instructions given to you by your provider today or if you need to schedule an appointment, you can reach us at [phone].    Thank you!

## 2019-08-05 NOTE — PROGRESS NOTES
"PCP:  Winsome Cast    Chief complaint: Epigastric pain, possible incisional hernia    History of Present Illness: Diana is a 63-year-old female who 13 years ago donated a kidney and the surgery was performed through a midline incision.  Then, about 5 years ago she had a laparoscopic cholecystectomy, and her epigastric port (11 mm) was placed through the upper portion of her old midline incision.    This history is important because in the last couple of years she is developed some discomfort in the upper abdominal area.  She feels like there is a bulge and describes a \"catching\" sensation at times which actually causes her to have to stop moving.  She did yoga with her daughter but it could not complete that.  She has difficulty when she is playing with her grandchildren.  She does not have any bowel related symptoms except that she feels bloated at times.  She does not necessarily correlate the abdominal wall symptoms with her bloating symptoms.  She is not nauseated she does not have heartburn.  She is never had an EGD.  She has had a recent colonoscopy which was remarkable for a couple of polyps.    Her past medical history was reviewed as well as her surgical history.  Histories:    Past Medical History:   Diagnosis Date     Anxiety state, unspecified      Chronic kidney disease (CKD) 11/28/2008 2006 cr 1.11 6/13/06 cr 0.94     Depressive disorder, not elsewhere classified      Insomnia, unspecified      Other specified anemias     History of anemia secondary to iron deficiency and menorrhagia     PONV (postoperative nausea and vomiting)      Thyroid disease      Unspecified allergic alveolitis and pneumonitis     atypical     Unspecified hearing loss     right      Urinary tract infection, site not specified        Past Surgical History:   Procedure Laterality Date     ARTHROSCOPY KNEE WITH MEDIAL MENISCECTOMY Right 1/22/2015    Procedure: ARTHROSCOPY KNEE WITH MEDIAL MENISCECTOMY;  Surgeon: " Alberto Mason MD;  Location: [facility] OR     HYSTERECTOMY, VAGINAL  2000    Partial Hysterectomy for menorrhagia     LAPAROSCOPIC CHOLECYSTECTOMY  2014    Procedure: LAPAROSCOPIC CHOLECYSTECTOMY;  Laparoscopic vs Open Cholecystectomy;  Surgeon: Kamille Hilliard MD;  Location: [facility] OR     laparoscopic wedge resection of right lung       SURGICAL HISTORY OF -   2006    Laparoscopic and hand-assisted left donor ureteronephrectomy       Family History   Problem Relation Age of Onset     Diabetes Mother      Hypertension Mother      Arthritis Mother      Heart Disease Mother      Lipids Mother      Diabetes Father      Cancer Father      Diabetes Sister      Genitourinary Problems Sister         kidney     Heart Disease Sister      Lipids Sister      Cancer Paternal Grandmother 40        melanoma     Family History Negative No family hx of        Social History     Tobacco Use     Smoking status: Former Smoker     Years: 15.00     Last attempt to quit: 1995     Years since quittin.6     Smokeless tobacco: Never Used   Substance Use Topics     Alcohol use: No       Current Outpatient Medications   Medication Sig Dispense Refill     levothyroxine (SYNTHROID/LEVOTHROID) 50 MCG tablet TAKE 1 TABLET BY MOUTH EVERY MORNING ON AN EMPTY STOMACH 30 tablet 7     simvastatin (ZOCOR) 20 MG tablet Take 1 tablet (20 mg) by mouth At Bedtime 30 tablet 11     Ascorbic Acid (VITAMIN C PO) Take 500 mg by mouth daily         Allergies   Allergen Reactions     Codeine GI Disturbance     Serzone [Nefazodone Hydrochloride] Nausea and Vomiting     After 1 dose       Images:  No results found for this or any previous visit (from the past 744 hour(s)).    Labs:  Results for orders placed or performed during the hospital encounter of 19   COLONOSCOPY   Result Value Ref Range    COLONOSCOPY       _______________________________________________________________________________  Patient Name: Diana Buenrostro           Procedure Date: 2/26/2019 10:11 AM  MRN: [id number]                       YOB: 1955  Admit Type: Outpatient                Age: 63  Gender: Female                        Attending MD: Donnie Terrazas MD  Total Sedation Time:                    _______________________________________________________________________________     Procedure:           Colonoscopy  Indications:         Screening for colorectal malignant neoplasm  Providers:           Donnie Terrazas MD, Frederick Shetty RN  Referring MD:        Donnie Miller MD  Medicines:           Monitored Anesthesia Care  Complications:       No immediate complications.  _______________________________________________________________________________  Procedure:           Pre-Anesthesia Assessment:                       - Prior to the procedure, a History and Physical was                        performed, and p atient medications, allergies and                        sensitivities were reviewed. The patient's tolerance of                        previous anesthesia was reviewed.                       - The risks and benefits of the procedure and the                        sedation options and risks were discussed with the                        patient. All questions were answered and informed                        consent was obtained.                       - Patient identification and proposed procedure were                        verified prior to the procedure by the physician, the                        nurse, the anesthetist and the technician. The procedure                        was verified in the pre-procedure area in the procedure                        room in the endoscopy suite.                       After obtaining informed consent, the colonoscope was                        passed under direct vision. Throughout the procedure,                        the patient's blood pressur e, pulse, and oxygen                        saturations were  monitored continuously. The Colonoscope                        was introduced through the anus and advanced to the                        cecum, identified by appendiceal orifice and ileocecal                        valve. The colonoscopy was performed without difficulty.                        The patient tolerated the procedure well. The quality of                        the bowel preparation was excellent.                                                                                   Findings:       The perianal and digital rectal examinations were normal.       Two sessile polyps were found in the hepatic flexure. The polyps were 4        to 5 mm in size. These polyps were removed with a hot snare. Resection        and retrieval were complete. Verification of patient identification for        the specimen was done using the patient's name, birth date and medical        record number. Estimated blood loss was minimal.        The exam was otherwise normal throughout the examined colon.       The retroflexed view of the distal rectum and anal verge was normal and        showed no anal or rectal abnormalities.                                                                                   Impression:          - Two 4 to 5 mm polyps at the hepatic flexure, removed                        with a hot snare. Resected and retrieved.  Recommendation:      - Discharge patient to home (ambulatory).                       - Await pathology results.                       - Repeat colonoscopy in 5 years for surveillance based                        on pathology results.                                                                                     Signed electronically by Donnie Terrazas MD  _______________  Donnie Terrazas MD  2/26/2019 11:00:23 AM  Number of Addenda: 0    Note Initiated On: 2/26/2019 10:11 AM  Scope In: 10:30:27 AM  Scope Out: 10:55:45 AM     Surgical pathology exam   Result Value Ref Range    Copath  "Report       Patient Name: DEVAN CRYSTAL  MR#: [id number]  Specimen #: [id number]  Collected: 2/26/2019  Received: 2/27/2019  Reported: 2/28/2019 13:59  Ordering Phy(s): OZZY CASE    For improved result formatting, select 'View Enhanced Report Format' under   Linked Documents section.    SPECIMEN(S):  Hepatic flexure polyp    FINAL DIAGNOSIS:  Hepatic flexure polyp:  - Tubulovillous adenoma.  - Negative for high grade dysplasia or malignancy.    COMMENT:  This case was seen in intradepartmental consultation.    Electronically signed out by:    HERMILO Johansen M.D.    CLINICAL HISTORY:  63-year-old female.  Screening.    GROSS:  A single specimen container with formalin is received labeled with the   patient's name, date of birth, and  medical record number. Information on the requisition slip, container, and   associated labels is confirmed.    The specimen is designated \"large intestine, hepatic flexure\". The   specimen consists of six pale tan soft  tissue fragments measuring up to 0.5 cm i n greatest dimension. The   specimen is entirely submitted in one  cassette. (Dictated by: Linda Glasgow 2/27/2019 11:23 AM)    MICROSCOPIC:  The sections show portions of a low grade adenomatous colon polyp with   villous features.  The polyp glands are  lined by mitotically active pseudostratified columnar epithelium with   elongated and tapered basal oriented  nuclei.  The glands show a partial depletion of the normal number of   goblet cells.  No areas of high-grade  dysplasia or malignancy are identified.   (Dictated by: YOSSI Johansen MD 02/28/2019)    The technical component of this testing was completed at the [facility], with the professional component performed   at the [facility], [address] [place] [phone] ([phone])    CPT Codes:  A: 24204-XJ7    COLLECTION SITE:  Client: " [facility]  Location: [facility] ([facility]           ROS:  Constitutional - Denies fevers, weight loss, malaise, lethargy  Neuro - Denies tremors or seizures  Pulmon - Denies SOB,   CV - Denies CP, SOB  GI - Denies hematemesis, BRBPR, melena, chronic diarrhea   - Denies hematuria, difficulty voiding, h/o STDs  Dermatology - No melanomas or skin cancers  Rheumatology - No h/o RA    BP (!) 147/93 (BP Location: Right arm, Patient Position: Chair, Cuff Size: Adult Regular)   Pulse 77   Temp 96.9  F (36.1  C) (Tympanic)   Resp 16     Exam:  General - Alert and Oriented X4, NAD, well nourished  HEENT - Normocephalic, atraumatic  Neck - supple, no LAD  Lungs - Clear  CV - Heart RRR  Abdomen - Soft, non-tender, +BS, no hepatosplenomegaly, no palpable masses, specifically no hernias palpable in the upper portion of her incision which is where she has her symptoms  Rectal -deferred, recent colonoscopy neuro - Full ROM, Strength 5/5 and major muscle groups, sensation intact  Extremities - No cyanosis, clubbing or edema.      Assessment and Plan: Patient presents with symptoms consistent with a hernia in her epigastric incision but I cannot palpate one.  She is not obese so I feel like I should be able to palpate it.  She gives a history that is consistent so I think I need to verify this with the CT scan.  Therefore a CT scan of her abdomen and pelvis was ordered.  I will inform her of the results when available and then see her back to discuss results and any further work-up that we might want to do.  She is agreeable with that plan.  Time spent with patient was 30 minutes all of it in face-to-face contact.    Donnie Terrazas MD FACS        Donnie Terrazas MD

## 2019-08-05 NOTE — LETTER
"    8/5/2019         RE: Diana Buenrostro  [address]  [place] [address]        Dear Colleague,    Thank you for referring your patient, Diana Buenrostro, to the [facility]. Please see a copy of my visit note below.    PCP:  Winsome Cast    Chief complaint: Epigastric pain, possible incisional hernia    History of Present Illness: Diana is a 63-year-old female who 13 years ago donated a kidney and the surgery was performed through a midline incision.  Then, about 5 years ago she had a laparoscopic cholecystectomy, and her epigastric port (11 mm) was placed through the upper portion of her old midline incision.    This history is important because in the last couple of years she is developed some discomfort in the upper abdominal area.  She feels like there is a bulge and describes a \"catching\" sensation at times which actually causes her to have to stop moving.  She did yoga with her daughter but it could not complete that.  She has difficulty when she is playing with her grandchildren.  She does not have any bowel related symptoms except that she feels bloated at times.  She does not necessarily correlate the abdominal wall symptoms with her bloating symptoms.  She is not nauseated she does not have heartburn.  She is never had an EGD.  She has had a recent colonoscopy which was remarkable for a couple of polyps.    Her past medical history was reviewed as well as her surgical history.  Histories:    Past Medical History:   Diagnosis Date     Anxiety state, unspecified      Chronic kidney disease (CKD) 11/28/2008     2006 cr 1.11 6/13/06 cr 0.94     Depressive disorder, not elsewhere classified      Insomnia, unspecified      Other specified anemias     History of anemia secondary to iron deficiency and menorrhagia     PONV (postoperative nausea and vomiting)      Thyroid disease      Unspecified allergic alveolitis and pneumonitis     atypical     Unspecified hearing " loss     right      Urinary tract infection, site not specified        Past Surgical History:   Procedure Laterality Date     ARTHROSCOPY KNEE WITH MEDIAL MENISCECTOMY Right 2015    Procedure: ARTHROSCOPY KNEE WITH MEDIAL MENISCECTOMY;  Surgeon: Alberto Mason MD;  Location: [facility] OR     HYSTERECTOMY, VAGINAL  2000    Partial Hysterectomy for menorrhagia     LAPAROSCOPIC CHOLECYSTECTOMY  2014    Procedure: LAPAROSCOPIC CHOLECYSTECTOMY;  Laparoscopic vs Open Cholecystectomy;  Surgeon: Kamille Hilliard MD;  Location: [facility] OR     laparoscopic wedge resection of right lung       SURGICAL HISTORY OF -   2006    Laparoscopic and hand-assisted left donor ureteronephrectomy       Family History   Problem Relation Age of Onset     Diabetes Mother      Hypertension Mother      Arthritis Mother      Heart Disease Mother      Lipids Mother      Diabetes Father      Cancer Father      Diabetes Sister      Genitourinary Problems Sister         kidney     Heart Disease Sister      Lipids Sister      Cancer Paternal Grandmother 40        melanoma     Family History Negative No family hx of        Social History     Tobacco Use     Smoking status: Former Smoker     Years: 15.00     Last attempt to quit: 1995     Years since quittin.6     Smokeless tobacco: Never Used   Substance Use Topics     Alcohol use: No       Current Outpatient Medications   Medication Sig Dispense Refill     levothyroxine (SYNTHROID/LEVOTHROID) 50 MCG tablet TAKE 1 TABLET BY MOUTH EVERY MORNING ON AN EMPTY STOMACH 30 tablet 7     simvastatin (ZOCOR) 20 MG tablet Take 1 tablet (20 mg) by mouth At Bedtime 30 tablet 11     Ascorbic Acid (VITAMIN C PO) Take 500 mg by mouth daily         Allergies   Allergen Reactions     Codeine GI Disturbance     Serzone [Nefazodone Hydrochloride] Nausea and Vomiting     After 1 dose       Images:  No results found for this or any previous visit (from the past 744 hour(s)).    Labs:  Results  for orders placed or performed during the hospital encounter of 02/26/19   COLONOSCOPY   Result Value Ref Range    COLONOSCOPY       _______________________________________________________________________________  Patient Name: Diana Buenrostro          Procedure Date: 2/26/2019 10:11 AM  MRN: [id number]                       YOB: 1955  Admit Type: Outpatient                Age: 63  Gender: Female                        Attending MD: Donnie Terrazas MD  Total Sedation Time:                    _______________________________________________________________________________     Procedure:           Colonoscopy  Indications:         Screening for colorectal malignant neoplasm  Providers:           Donnie Terrazas MD, Frederick Shetty RN  Referring MD:        Donnie Miller MD  Medicines:           Monitored Anesthesia Care  Complications:       No immediate complications.  _______________________________________________________________________________  Procedure:           Pre-Anesthesia Assessment:                       - Prior to the procedure, a History and Physical was                        performed, and p atient medications, allergies and                        sensitivities were reviewed. The patient's tolerance of                        previous anesthesia was reviewed.                       - The risks and benefits of the procedure and the                        sedation options and risks were discussed with the                        patient. All questions were answered and informed                        consent was obtained.                       - Patient identification and proposed procedure were                        verified prior to the procedure by the physician, the                        nurse, the anesthetist and the technician. The procedure                        was verified in the pre-procedure area in the procedure                        room in the endoscopy suite.                        After obtaining informed consent, the colonoscope was                        passed under direct vision. Throughout the procedure,                        the patient's blood pressur e, pulse, and oxygen                        saturations were monitored continuously. The Colonoscope                        was introduced through the anus and advanced to the                        cecum, identified by appendiceal orifice and ileocecal                        valve. The colonoscopy was performed without difficulty.                        The patient tolerated the procedure well. The quality of                        the bowel preparation was excellent.                                                                                   Findings:       The perianal and digital rectal examinations were normal.       Two sessile polyps were found in the hepatic flexure. The polyps were 4        to 5 mm in size. These polyps were removed with a hot snare. Resection        and retrieval were complete. Verification of patient identification for        the specimen was done using the patient's name, birth date and medical        record number. Estimated blood loss was minimal.        The exam was otherwise normal throughout the examined colon.       The retroflexed view of the distal rectum and anal verge was normal and        showed no anal or rectal abnormalities.                                                                                   Impression:          - Two 4 to 5 mm polyps at the hepatic flexure, removed                        with a hot snare. Resected and retrieved.  Recommendation:      - Discharge patient to home (ambulatory).                       - Await pathology results.                       - Repeat colonoscopy in 5 years for surveillance based                        on pathology results.                                                                                     Signed electronically by Donnie  "MD Mk  _______________  Ozzy Terrazas MD  2/26/2019 11:00:23 AM  Number of Addenda: 0    Note Initiated On: 2/26/2019 10:11 AM  Scope In: 10:30:27 AM  Scope Out: 10:55:45 AM     Surgical pathology exam   Result Value Ref Range    Copath Report       Patient Name: DEVAN CRYSTAL  MR#: [id number]  Specimen #: [id number]  Collected: 2/26/2019  Received: 2/27/2019  Reported: 2/28/2019 13:59  Ordering Phy(s): OZZY TERRAZAS    For improved result formatting, select 'View Enhanced Report Format' under   Linked Documents section.    SPECIMEN(S):  Hepatic flexure polyp    FINAL DIAGNOSIS:  Hepatic flexure polyp:  - Tubulovillous adenoma.  - Negative for high grade dysplasia or malignancy.    COMMENT:  This case was seen in intradepartmental consultation.    Electronically signed out by:    HERMILO Johansen M.D.    CLINICAL HISTORY:  63-year-old female.  Screening.    GROSS:  A single specimen container with formalin is received labeled with the   patient's name, date of birth, and  medical record number. Information on the requisition slip, container, and   associated labels is confirmed.    The specimen is designated \"large intestine, hepatic flexure\". The   specimen consists of six pale tan soft  tissue fragments measuring up to 0.5 cm i n greatest dimension. The   specimen is entirely submitted in one  cassette. (Dictated by: Linda Glasgow 2/27/2019 11:23 AM)    MICROSCOPIC:  The sections show portions of a low grade adenomatous colon polyp with   villous features.  The polyp glands are  lined by mitotically active pseudostratified columnar epithelium with   elongated and tapered basal oriented  nuclei.  The glands show a partial depletion of the normal number of   goblet cells.  No areas of high-grade  dysplasia or malignancy are identified.   (Dictated by: YOSSI Johansen MD 02/28/2019)    The technical component of this testing was completed at the [facility], " with the professional component performed   at the [facility], [address] [place] [phone] ([phone])    CPT Codes:  A: 64371-DO0    COLLECTION SITE:  Client: [facility]  Location: [facility] (K)           ROS:  Constitutional - Denies fevers, weight loss, malaise, lethargy  Neuro - Denies tremors or seizures  Pulmon - Denies SOB,   CV - Denies CP, SOB  GI - Denies hematemesis, BRBPR, melena, chronic diarrhea   - Denies hematuria, difficulty voiding, h/o STDs  Dermatology - No melanomas or skin cancers  Rheumatology - No h/o RA    BP (!) 147/93 (BP Location: Right arm, Patient Position: Chair, Cuff Size: Adult Regular)   Pulse 77   Temp 96.9  F (36.1  C) (Tympanic)   Resp 16     Exam:  General - Alert and Oriented X4, NAD, well nourished  HEENT - Normocephalic, atraumatic  Neck - supple, no LAD  Lungs - Clear  CV - Heart RRR  Abdomen - Soft, non-tender, +BS, no hepatosplenomegaly, no palpable masses, specifically no hernias palpable in the upper portion of her incision which is where she has her symptoms  Rectal -deferred, recent colonoscopy neuro - Full ROM, Strength 5/5 and major muscle groups, sensation intact  Extremities - No cyanosis, clubbing or edema.      Assessment and Plan: Patient presents with symptoms consistent with a hernia in her epigastric incision but I cannot palpate one.  She is not obese so I feel like I should be able to palpate it.  She gives a history that is consistent so I think I need to verify this with the CT scan.  Therefore a CT scan of her abdomen and pelvis was ordered.  I will inform her of the results when available and then see her back to discuss results and any further work-up that we might want to do.  She is agreeable with that plan.  Time spent with patient was 30 minutes all of it in face-to-face contact.    Donnie Terrazas MD FACS        Donnie Terrazas MD              Again, thank you for allowing me to participate in the care of your patient.        Sincerely,        Donnie Terrazas MD

## 2019-08-07 ENCOUNTER — HOSPITAL ENCOUNTER (OUTPATIENT)
Dept: CT IMAGING | Facility: CLINIC | Age: 64
Discharge: HOME | End: 2019-08-07
Attending: SURGERY | Admitting: SURGERY
Payer: COMMERCIAL

## 2019-08-07 LAB
CREAT BLD-MCNC: 1.2 MG/DL (ref 0.52–1.04)
GFR SERPL CREATININE-BSD FRML MDRD: 46 ML/MIN/{1.73_M2}

## 2019-08-07 PROCEDURE — 74177 CT ABD & PELVIS W/CONTRAST: CPT

## 2019-08-07 PROCEDURE — 25000125 ZZHC RX 250: Performed by: RADIOLOGY

## 2019-08-07 PROCEDURE — 25000128 H RX IP 250 OP 636: Performed by: RADIOLOGY

## 2019-08-07 PROCEDURE — 82565 ASSAY OF CREATININE: CPT

## 2019-08-07 RX ORDER — IOPAMIDOL 755 MG/ML
84 INJECTION, SOLUTION INTRAVASCULAR ONCE
Status: COMPLETED | OUTPATIENT
Start: 2019-08-07 | End: 2019-08-07

## 2019-08-07 RX ADMIN — SODIUM CHLORIDE 61 ML: 9 INJECTION, SOLUTION INTRAVENOUS at 11:05

## 2019-08-07 RX ADMIN — IOPAMIDOL 84 ML: 755 INJECTION, SOLUTION INTRAVENOUS at 11:05

## 2019-08-19 ENCOUNTER — OFFICE VISIT (OUTPATIENT)
Dept: SURGERY | Facility: CLINIC | Age: 64
End: 2019-08-19
Payer: COMMERCIAL

## 2019-08-19 VITALS
WEIGHT: 173 LBS | HEIGHT: 69 IN | SYSTOLIC BLOOD PRESSURE: 136 MMHG | TEMPERATURE: 97.7 F | RESPIRATION RATE: 16 BRPM | DIASTOLIC BLOOD PRESSURE: 77 MMHG | HEART RATE: 72 BPM | BODY MASS INDEX: 25.62 KG/M2

## 2019-08-19 DIAGNOSIS — R10.13 CHRONIC EPIGASTRIC PAIN: Primary | ICD-10-CM

## 2019-08-19 DIAGNOSIS — G89.29 CHRONIC EPIGASTRIC PAIN: Primary | ICD-10-CM

## 2019-08-19 PROCEDURE — 99213 OFFICE O/P EST LOW 20 MIN: CPT | Performed by: SURGERY

## 2019-08-19 ASSESSMENT — MIFFLIN-ST. JEOR: SCORE: 1409.1

## 2019-08-19 NOTE — NURSING NOTE
"Chief Complaint   Patient presents with     RECHECK     go over results from CT scan done on 8/7/19       Initial /77 (BP Location: Right arm, Patient Position: Chair, Cuff Size: Adult Regular)   Pulse 72   Temp 97.7  F (36.5  C) (Tympanic)   Resp 16   Ht 1.753 m (5' 9\")   Wt 78.5 kg (173 lb)   BMI 25.55 kg/m   Estimated body mass index is 25.55 kg/m  as calculated from the following:    Height as of this encounter: 1.753 m (5' 9\").    Weight as of this encounter: 78.5 kg (173 lb).  Medications and allergies reviewed.    Jazmyn LEBRON CMA (AAMA)    "

## 2019-08-19 NOTE — PATIENT INSTRUCTIONS
Per physician instructions.    If you have questions or concerns on any instructions given to you by your provider today or if you need to schedule an appointment, you can reach us at [phone].    Thank you!

## 2019-08-19 NOTE — LETTER
8/19/2019         RE: Diana Buenrostro  [address]  [place] [address]        Dear Colleague,    Thank you for referring your patient, Diana Buenrostro, to the [facility]. Please see a copy of my visit note below.    Diana is here for recheck of her CT scan.  The results had previously been communicated to her and she reviewed the findings on my chart.    Basically, everything is normal with the exception of #1 a couple of adjacent tiny fascial defects in the midline just above the umbilicus.  These contain some fat and are quite small.    She also has fatty liver.  We discussed the implications of that.    I told her that repairing the hernia is certainly possible but that is really up to her to decide when we do it.  They are not threatening her life in she is not bothered by these and enough to have them repaired.  I told her that when the symptoms become such that they are interfering with her life then we should fix them.    She is agreeable with that and will let me know when that time comes.    I spent 15 minutes with her all of it in face-to-face contact.    Donnie Terrazas MD FACS    Again, thank you for allowing me to participate in the care of your patient.        Sincerely,        Donnie Terrazas MD

## 2019-08-19 NOTE — PROGRESS NOTES
Diana is here for recheck of her CT scan.  The results had previously been communicated to her and she reviewed the findings on my chart.    Basically, everything is normal with the exception of #1 a couple of adjacent tiny fascial defects in the midline just above the umbilicus.  These contain some fat and are quite small.    She also has fatty liver.  We discussed the implications of that.    I told her that repairing the hernia is certainly possible but that is really up to her to decide when we do it.  They are not threatening her life in she is not bothered by these and enough to have them repaired.  I told her that when the symptoms become such that they are interfering with her life then we should fix them.    She is agreeable with that and will let me know when that time comes.    I spent 15 minutes with her all of it in face-to-face contact.    Donnie Terrazas MD FACS

## 2019-09-14 DIAGNOSIS — E78.5 HYPERLIPIDEMIA LDL GOAL <100: ICD-10-CM

## 2019-09-16 RX ORDER — SIMVASTATIN 20 MG
TABLET ORAL
Qty: 30 TABLET | Refills: 0 | Status: SHIPPED | OUTPATIENT
Start: 2019-09-16 | End: 2019-10-16

## 2019-09-16 NOTE — TELEPHONE ENCOUNTER
"Requested Prescriptions   Pending Prescriptions Disp Refills     simvastatin (ZOCOR) 20 MG tablet [Pharmacy Med Name: SIMVASTATIN 20 MG TABLET] 90 tablet 2     Sig: TAKE 1 TABLET BY MOUTH AT BEDTIME   Last Written Prescription Date:  8/21/18  Last Fill Quantity: 30 tab,  # refills: 11   Last office visit: 7/25/2019 with prescribing provider:  Winsome Cast     Future Office Visit:        Statins Protocol Failed - 9/14/2019 12:35 AM        Failed - Recent (12 mo) or future (30 days) visit within the authorizing provider's specialty     Patient had office visit in the last 12 months or has a visit in the next 30 days with authorizing provider or within the authorizing provider's specialty.  See \"Patient Info\" tab in inbasket, or \"Choose Columns\" in Meds & Orders section of the refill encounter.              Passed - LDL on file in past 12 months     Recent Labs   Lab Test 09/17/18  0652   LDL 74             Passed - No abnormal creatine kinase in past 12 months     No lab results found.             Passed - Medication is active on med list        Passed - Patient is age 18 or older        Passed - No active pregnancy on record        Passed - No positive pregnancy test in past 12 months          "

## 2019-09-16 NOTE — TELEPHONE ENCOUNTER
"Medication is being filled for 1 time refill only due to:  Patient needs labs lipids. and annual exam/ has several things on health maint     Called and reached her, \"oh, I didn't even know they requested the refill, I am not out, but I will sure come in in the next month or so, thank you.   Loraine Andino RNC      "

## 2019-09-27 ENCOUNTER — HEALTH MAINTENANCE LETTER (OUTPATIENT)
Age: 64
End: 2019-09-27

## 2019-10-16 DIAGNOSIS — E03.9 HYPOTHYROIDISM: Primary | ICD-10-CM

## 2019-10-16 DIAGNOSIS — E78.5 HYPERLIPIDEMIA LDL GOAL <100: ICD-10-CM

## 2019-10-16 DIAGNOSIS — N18.9 CHRONIC KIDNEY DISEASE: Chronic | ICD-10-CM

## 2019-10-16 RX ORDER — SIMVASTATIN 20 MG
TABLET ORAL
Qty: 30 TABLET | Refills: 0 | Status: SHIPPED | OUTPATIENT
Start: 2019-10-16 | End: 2019-11-14

## 2019-10-16 NOTE — TELEPHONE ENCOUNTER
Medication is being filled for 1 time refill only due to:  Patient needs labs lipids,bmp,thyroid. Future labs ordered yes.   Sixto Rosa RN

## 2019-10-22 ENCOUNTER — TELEPHONE (OUTPATIENT)
Dept: FAMILY MEDICINE | Facility: CLINIC | Age: 64
End: 2019-10-22

## 2019-10-22 DIAGNOSIS — R19.7 DIARRHEA: Primary | ICD-10-CM

## 2019-10-22 NOTE — TELEPHONE ENCOUNTER
Reason for call:  Patient reporting a symptom    Symptom or request: Diana is traveling starting tomorrow and her daughter was diagnosed with giardia,  She was with her for a few days.  She is not really having sx. But has a bit of stomach upset.  Hoping she could get in to leave a stool sample to see if she has it before she travels.  Please call and assess. Thank you..Pastora Palacios    Duration (how long have symptoms been present): unknown    Have you been treated for this before? No    Phone Number patient can be reached at:  Home number on file [phone] (home)    Best Time:  Any time    Can we leave a detailed message on this number:  YES    Call taken on 10/22/2019 at 11:08 AM by Pastora Palacios

## 2019-10-22 NOTE — TELEPHONE ENCOUNTER
I can definitely place the order for a stool sample - will it be possible for her to come to clinic and  the lab stuff and then drop off a sample before she leaves tomorrow?   kali

## 2019-10-22 NOTE — TELEPHONE ENCOUNTER
Diana said that she would like to  the kit. Message handled by Nurse Triage with [facility] - provider name: Segun.  Stool  testing ordered. Diana was notified to use good handwashing and to avoid swimming in the pool until results.  Sixto Rosa RN

## 2019-10-22 NOTE — TELEPHONE ENCOUNTER
"Diana is going to [place]; leaving tomorrow. Daughter, Hilda was diagnosed with giardia one week ago. Hilda is better after being on antibiotics. Diana was with Hilda when Hilda's symptoms started and then afterwards too.   Diana reports that off and on has had diarrhea the past few days; also has felt \"gnawing in my stomach.\"She reports that she has noted a little bit of blood \"but I have a hemorrhoid. Denies mucous.  This morning has had 5 episodes of diarrhea.   Denies fever.   Denies burning upon urination.  How do you advise?  Thank you.   Sixto Rosa RN        "

## 2019-10-23 DIAGNOSIS — R19.7 DIARRHEA: ICD-10-CM

## 2019-10-23 PROCEDURE — 87209 SMEAR COMPLEX STAIN: CPT | Performed by: PHYSICIAN ASSISTANT

## 2019-10-23 PROCEDURE — 87506 IADNA-DNA/RNA PROBE TQ 6-11: CPT | Performed by: PHYSICIAN ASSISTANT

## 2019-10-23 PROCEDURE — 87177 OVA AND PARASITES SMEARS: CPT | Performed by: PHYSICIAN ASSISTANT

## 2019-10-24 LAB
C COLI+JEJUNI+LARI FUSA STL QL NAA+PROBE: NOT DETECTED
EC STX1 GENE STL QL NAA+PROBE: NOT DETECTED
EC STX2 GENE STL QL NAA+PROBE: NOT DETECTED
ENTERIC PATHOGEN COMMENT: NORMAL
NOROV GI+II ORF1-ORF2 JNC STL QL NAA+PR: NOT DETECTED
O+P STL MICRO: NORMAL
O+P STL MICRO: NORMAL
RVA NSP5 STL QL NAA+PROBE: NOT DETECTED
SALMONELLA SP RPOD STL QL NAA+PROBE: NOT DETECTED
SHIGELLA SP+EIEC IPAH STL QL NAA+PROBE: NOT DETECTED
SPECIMEN SOURCE: NORMAL
V CHOL+PARA RFBL+TRKH+TNAA STL QL NAA+PR: NOT DETECTED
Y ENTERO RECN STL QL NAA+PROBE: NOT DETECTED

## 2019-11-08 ENCOUNTER — TELEPHONE (OUTPATIENT)
Dept: FAMILY MEDICINE | Facility: CLINIC | Age: 64
End: 2019-11-08

## 2019-11-08 DIAGNOSIS — A07.1 GIARDIA: Primary | ICD-10-CM

## 2019-11-08 RX ORDER — METRONIDAZOLE 500 MG/1
500 TABLET ORAL 3 TIMES DAILY
Qty: 21 TABLET | Refills: 1 | Status: SHIPPED | OUTPATIENT
Start: 2019-11-08 | End: 2020-03-23

## 2019-11-08 NOTE — TELEPHONE ENCOUNTER
Call placed to patient.  Relayed metronidazole dosing, follow up 1 week if not better.  Patient verbalizes understanding, agrees with plan.  Leslie Rico RN

## 2019-11-08 NOTE — TELEPHONE ENCOUNTER
Reason for Call:  Other call back    Detailed comments:  Diana would like to speak with RN.  No other information was given.  Please call.  Thank you..Pastora Palacios    Phone Number Patient can be reached at: Home number on file [phone] (home)    Best Time: any time    Can we leave a detailed message on this number? YES    Call taken on 11/8/2019 at 9:05 AM by Pastora Palacios

## 2019-11-08 NOTE — TELEPHONE ENCOUNTER
Yes lests treat    Take metronidazole 500mg 3 times a day for 10 days.   let me know if not better in 1 week.

## 2019-11-08 NOTE — TELEPHONE ENCOUNTER
Call placed to patient.  Patient concerned with continued loose stools.  10/23/19 stool studies all negative.    Patient has had continued direct contact with daughter, granddaughter that have tested + Giardia.  Daughter is on 2nd course of treatment for reoccurrence.  Patient has been changing diapers, etc for grand children 2&4 yrs old.  Plans to return to daughters home this weekend.    Well has been identified as possible source.  Using water only for bathing, cleaning.    Patient reports having 3 loose stool this am, soft watery orange brown in color. Patient is having rectal itching. Intermittent loose stools approximately 2-3 daily over the last week, varied amounts.  Occasional abdominal cramping + bloating.  Poor appetite, good po fluid intake.  Afebrile.    Patient asking if she should repeat stool testing or just be treated prophylacticly  for giardia as several family members have tested +.  Please advise.  Leslie Rico RN

## 2019-11-14 DIAGNOSIS — E78.5 HYPERLIPIDEMIA LDL GOAL <100: ICD-10-CM

## 2019-11-14 RX ORDER — SIMVASTATIN 20 MG
20 TABLET ORAL AT BEDTIME
Qty: 30 TABLET | Refills: 0 | Status: SHIPPED | OUTPATIENT
Start: 2019-11-14 | End: 2019-12-10

## 2019-11-14 NOTE — TELEPHONE ENCOUNTER
"SIMVASTATIN 20 MG TABLET      Last Written Prescription Date:  10/16/19  Last Fill Quantity: 30,   # refills: 0  Last Office Visit: 7/25/19  Future Office visit:       Requested Prescriptions   Pending Prescriptions Disp Refills     simvastatin (ZOCOR) 20 MG tablet [Pharmacy Med Name: SIMVASTATIN 20 MG TABLET] 30 tablet 0     Sig: TAKE 1 TABLET BY MOUTH EVERYDAY AT BEDTIME       Statins Protocol Failed - 11/14/2019  2:24 AM        Failed - LDL on file in past 12 months     Recent Labs   Lab Test 09/17/18  0652   LDL 74             Passed - No abnormal creatine kinase in past 12 months     No lab results found.             Passed - Recent (12 mo) or future (30 days) visit within the authorizing provider's specialty     Patient has had an office visit with the authorizing provider or a provider within the authorizing providers department within the previous 12 mos or has a future within next 30 days. See \"Patient Info\" tab in inbasket, or \"Choose Columns\" in Meds & Orders section of the refill encounter.              Passed - Medication is active on med list        Passed - Patient is age 18 or older        Passed - No active pregnancy on record        Passed - No positive pregnancy test in past 12 months          "

## 2019-12-05 ENCOUNTER — TRANSFERRED RECORDS (OUTPATIENT)
Dept: HEALTH INFORMATION MANAGEMENT | Facility: CLINIC | Age: 64
End: 2019-12-05

## 2020-01-12 DIAGNOSIS — E78.5 HYPERLIPIDEMIA LDL GOAL <100: ICD-10-CM

## 2020-01-13 NOTE — TELEPHONE ENCOUNTER
Routing refill request to provider for review/approval because:  Loyda given x1 and patient did not follow up,   Labs not current:  9/17/18  Sxito Rosa RN

## 2020-01-13 NOTE — TELEPHONE ENCOUNTER
"SIMVASTATIN 20 MG TABLET      Last Written Prescription Date:  12/16/19  Last Fill Quantity: 30,   # refills: 0  Last Office Visit: 7/25/19  Future Office visit:       Requested Prescriptions   Pending Prescriptions Disp Refills     simvastatin (ZOCOR) 20 MG tablet [Pharmacy Med Name: SIMVASTATIN 20 MG TABLET] 30 tablet 0     Sig: TAKE 1 TABLET (20 MG) BY MOUTH AT BEDTIME OK TO FILL TODAY NEEDS FASTING LAB BEFORE FURTHER REFILLS       Statins Protocol Failed - 1/12/2020  7:36 AM        Failed - LDL on file in past 12 months     Recent Labs   Lab Test 09/17/18  0652   LDL 74             Passed - No abnormal creatine kinase in past 12 months     No lab results found.             Passed - Recent (12 mo) or future (30 days) visit within the authorizing provider's specialty     Patient has had an office visit with the authorizing provider or a provider within the authorizing providers department within the previous 12 mos or has a future within next 30 days. See \"Patient Info\" tab in inbasket, or \"Choose Columns\" in Meds & Orders section of the refill encounter.              Passed - Medication is active on med list        Passed - Patient is age 18 or older        Passed - No active pregnancy on record        Passed - No positive pregnancy test in past 12 months          "

## 2020-01-14 RX ORDER — SIMVASTATIN 20 MG
20 TABLET ORAL AT BEDTIME
Qty: 30 TABLET | Refills: 0 | Status: SHIPPED | OUTPATIENT
Start: 2020-01-14 | End: 2020-02-14

## 2020-01-14 NOTE — TELEPHONE ENCOUNTER
"Final refill - this will be her \"em refill #2\"   Please call and remind her she needs labs to continue refills. These have been ordered already    Winsome  "

## 2020-01-14 NOTE — TELEPHONE ENCOUNTER
Patient notified and she is aware she needs to come in for labs. She voiced understanding.  Mabel Celis CMA

## 2020-02-26 ENCOUNTER — TELEPHONE (OUTPATIENT)
Dept: FAMILY MEDICINE | Facility: CLINIC | Age: 65
End: 2020-02-26

## 2020-02-26 DIAGNOSIS — E03.9 HYPOTHYROIDISM, UNSPECIFIED TYPE: ICD-10-CM

## 2020-02-26 NOTE — TELEPHONE ENCOUNTER
Overdue for labs and looks like she has been notified of need a couple times per chart review/other med requests.     Has future orders for Lipid panel. TSH and BMP that need to be drawn. See prior refill requests-has been given em refills as well.     Message left to return call. Needs to get labs drawn so Provider can decide if she is on correct dose of medication or if it needs to be adjusted based on lab result.     Gaby Sherman RN

## 2020-03-04 RX ORDER — LEVOTHYROXINE SODIUM 50 UG/1
TABLET ORAL
Qty: 30 TABLET | Refills: 0 | Status: SHIPPED | OUTPATIENT
Start: 2020-03-04 | End: 2020-04-01

## 2020-03-04 NOTE — TELEPHONE ENCOUNTER
I spoke to her she is aware she is caring for family out in [place]. She will come in for lab. Segun is pcp.  Iraida Morrow RN

## 2020-03-09 ENCOUNTER — HOSPITAL ENCOUNTER (EMERGENCY)
Facility: CLINIC | Age: 65
Discharge: HOME | End: 2020-03-09
Attending: EMERGENCY MEDICINE | Admitting: EMERGENCY MEDICINE
Payer: COMMERCIAL

## 2020-03-09 VITALS
OXYGEN SATURATION: 99 % | HEART RATE: 82 BPM | TEMPERATURE: 97.8 F | SYSTOLIC BLOOD PRESSURE: 132 MMHG | DIASTOLIC BLOOD PRESSURE: 92 MMHG | RESPIRATION RATE: 16 BRPM

## 2020-03-09 DIAGNOSIS — N30.00 ACUTE CYSTITIS WITHOUT HEMATURIA: ICD-10-CM

## 2020-03-09 LAB
ALBUMIN UR-MCNC: 10 MG/DL
APPEARANCE UR: ABNORMAL
BACTERIA #/AREA URNS HPF: ABNORMAL /HPF
BILIRUB UR QL STRIP: NEGATIVE
COLOR UR AUTO: YELLOW
GLUCOSE UR STRIP-MCNC: NEGATIVE MG/DL
HGB UR QL STRIP: ABNORMAL
KETONES UR STRIP-MCNC: NEGATIVE MG/DL
LEUKOCYTE ESTERASE UR QL STRIP: ABNORMAL
MUCOUS THREADS #/AREA URNS LPF: PRESENT /LPF
NITRATE UR QL: NEGATIVE
PH UR STRIP: 5.5 PH (ref 5–7)
RBC #/AREA URNS AUTO: 23 /HPF (ref 0–2)
SOURCE: ABNORMAL
SP GR UR STRIP: 1.02 (ref 1–1.03)
SQUAMOUS #/AREA URNS AUTO: <1 /HPF (ref 0–1)
UROBILINOGEN UR STRIP-MCNC: NORMAL MG/DL (ref 0–2)
WBC #/AREA URNS AUTO: >182 /HPF (ref 0–5)

## 2020-03-09 PROCEDURE — 87086 URINE CULTURE/COLONY COUNT: CPT | Performed by: EMERGENCY MEDICINE

## 2020-03-09 PROCEDURE — 81001 URINALYSIS AUTO W/SCOPE: CPT | Performed by: EMERGENCY MEDICINE

## 2020-03-09 PROCEDURE — 87186 SC STD MICRODIL/AGAR DIL: CPT | Performed by: EMERGENCY MEDICINE

## 2020-03-09 PROCEDURE — 87088 URINE BACTERIA CULTURE: CPT | Performed by: EMERGENCY MEDICINE

## 2020-03-09 PROCEDURE — 99283 EMERGENCY DEPT VISIT LOW MDM: CPT

## 2020-03-09 RX ORDER — CEPHALEXIN 500 MG/1
500 CAPSULE ORAL 4 TIMES DAILY
Qty: 28 CAPSULE | Refills: 0 | Status: SHIPPED | OUTPATIENT
Start: 2020-03-09 | End: 2020-07-27

## 2020-03-09 ASSESSMENT — ENCOUNTER SYMPTOMS
DYSURIA: 1
FREQUENCY: 1
HEMATURIA: 1
NAUSEA: 0
VOMITING: 0
BACK PAIN: 1
FEVER: 0
ROS GI COMMENTS: ABDOMINAL PRESSURE

## 2020-03-09 NOTE — ED PROVIDER NOTES
History     Chief Complaint:  Dysuria    HPI   Diana Buenrostro is a 64 year old female with a history of hyperlipidemia, urinary tract infection, and chronic kidney disease status post laparoscopic and hand assisted left donor ureteronephrectomy who presents to the emergency department today for evaluation of dysuria. The patient reports the development of dysuria, frequency, hematuria, low back pain, and lower abdominal pressure last night, adding that this has persisted since onset. She denies any fever, nausea, and vomiting.     Allergies:  Codeine  Serzone   Denies antibiotic allergies    Medications:    Synthroid  Zocor    Past Medical History:    Anxiety state  Chronic kidney disease  Depressive disorder  Insomnia   Anemia  Thyroid disease  Allergic alveolitis and pneumonitis  Hearing loss  Urinary tract infection  Transient visual loss  Kidney donor  Pulmonary nodule  Hyperlipidemia  Sarcoidosis  Hypothyroidism  Calculus of gallbladder  Closed fracture of distal end of right radius  Complex regional pain syndrome type II of right upper extremity  Tubulovillous adenoma    Past Surgical History:    Arthroscopy knee with medial meniscectomy  Vaginal hysterectomy  Laparoscopic cholecystectomy  Laparoscopic and hand assisted left donor ureteronephrectomy     Family History:    Mother: diabetes, hypertension, arthritis, heart disease, lipids  Father: diabetes, cancer  Sister: diabetes, kidney issues, heart disease, lipids    Social History:  The patient presents independently.  Patient's father is currently in the hospital.   Smoking Status: Former Smoker   Years: 15  Smokeless Tobacco: Never Used  Alcohol Use: Negative  Drug Use: Negative  PCP: Winsome Cast  Marital Status:        Review of Systems   Constitutional: Negative for fever.   Gastrointestinal: Negative for nausea and vomiting.        Abdominal pressure   Genitourinary: Positive for dysuria, frequency and hematuria.    Musculoskeletal: Positive for back pain.   All other systems reviewed and are negative.      Physical Exam     Patient Vitals for the past 24 hrs:   BP Temp Pulse Resp SpO2   03/09/20 0919 (!) 132/92 97.8  F (36.6  C) 82 16 99 %     Physical Exam  Nursing note and vitals reviewed.  General: Patient is alert and interactive when I enter the room  Head:  The scalp, face, and head appear normal  Eyes:  Conjunctivae are normal  ENT:    The nose is normal    Pinnae are normal  Neck:  Trachea midline  CV:  Normal rate  Resp:  No respiratory distress   Musc:  Normal muscular tone. Bilateral CVA tenderness.   Skin:  No rash or lesions noted  Neuro: Speech is normal and fluent. Face is symmetric. Moving all extremities well.   Psych:  Awake. Alert.  Normal affect.  Appropriate interactions.      Emergency Department Course     Laboratory:  Laboratory findings were communicated with the patient who voiced understanding of the findings.    UA with Microscopic Reflex to Culture: Blood small (A), Protein Albumin 10 (A), Leukocyte Esterase large (A), WBC >182 (H), RBC 23 (H), Bacteria few (A), Mucous present (A), o/w WNL   Urine Culture: pending    Emergency Department Course:    0913 Nursing notes and vitals reviewed.    0926 The patient provided a urine sample here in the emergency department. This was sent for laboratory testing, findings above.    0928 I performed an exam of the patient as documented above.     1016 Patient rechecked and updated.      Findings and plan explained to the patient. Patient discharged home with instructions regarding supportive care, medications, and reasons to return. The importance of close follow-up was reviewed. The patient was prescribed Keflex.    Impression & Plan      Medical Decision Making:  Diana Buenrostro is a 64 year old female who presents to the emergency department today for evaluation of dysuria, frequency, hematuria, low back pain, and lower abdominal pressure. Symptoms are  consistent with urinary tract infection. Urinalysis confirms the infection.  There has been no fever or abdominal pain. She has complained of mild back pain and has CVA tenderness. She also only has 1 kidney. I will cover her with QID keflex dosing as this isn't simple cystitis.   The patient is instructed to return if increasing pain, vomiting, fever, or inability to tolerate the oral antibiotic.  Follow up with primary physician is indicated if not improving in 2-3 days. She is in stable condition at the time of discharge, indications for return to the ED were discussed as well as follow up. All questions were answered and she is in agreement with the plan.    Diagnosis:    ICD-10-CM    1. Acute cystitis without hematuria  N30.00      Disposition:   The patient is discharged to home.    Discharge Medications:  Discharge Medication List as of 3/9/2020 10:20 AM      START taking these medications    Details   cephALEXin (KEFLEX) 500 MG capsule Take 1 capsule (500 mg) by mouth 4 times daily for 7 days, Disp-28 capsule,R-0, Local Print           Scribe Disclosure:  Merced VILLANUEVA, am serving as a scribe at 9:22 AM on 3/9/2020 to document services personally performed by Bruno York MD based on my observations and the provider's statements to me.    [facility] EMERGENCY DEPARTMENT       Bruno York MD  03/09/20 1093

## 2020-03-09 NOTE — ED AVS SNAPSHOT
[facility] Emergency Department  [address]  [facility] [address]  Phone:  [phone]  Fax:  [phone]                                    Diana Buenrostro   MRN: [id number]    Department:  [facility] Emergency Department   Date of Visit:  3/9/2020           After Visit Summary Signature Page    I have received my discharge instructions, and my questions have been answered. I have discussed any challenges I see with this plan with the nurse or doctor.    ..........................................................................................................................................  Patient/Patient Representative Signature      ..........................................................................................................................................  Patient Representative Print Name and Relationship to Patient    ..................................................               ................................................  Date                                   Time    ..........................................................................................................................................  Reviewed by Signature/Title    ...................................................              ..............................................  Date                                               Time          22EPIC Rev 08/18

## 2020-03-10 LAB
BACTERIA SPEC CULT: ABNORMAL
Lab: ABNORMAL
SPECIMEN SOURCE: ABNORMAL

## 2020-03-10 NOTE — RESULT ENCOUNTER NOTE
Emergency Dept/Urgent Care discharge antibiotic: Cephalexin (Keflex) 500 mg capsule, 1 capsule (500 mg) by mouth 4 times daily for 7 days.  Date of Rx (If Applicable): 3/9/20  Recommendations per [place] ED Lab result protocol - Urine culture protocol.

## 2020-03-11 ENCOUNTER — TELEPHONE (OUTPATIENT)
Dept: EMERGENCY MEDICINE | Facility: CLINIC | Age: 65
End: 2020-03-11

## 2020-03-11 NOTE — TELEPHONE ENCOUNTER
"[facility] Emergency Department Lab result notification     Patient/parent Name  Diana    Reason for call  Patient requesting lab result    Lab Result  Final Urine Culture Report on 3/10/2020  Emergency Dept/Urgent Care discharge antibiotic prescribed: Cephalexin (Keflex) 500 mg capsule, 1 capsule (500 mg) by mouth 4 times daily for 7 days.   #1. Bacteria, >100,000 colonies/mL Escherichia coli , is SUSCEPTIBLE to Antibiotic.    As per [place] ED Lab Result protocol, no change in antibiotic therapy.  Current symptoms  10:12AM: Patient states she continues to have some back pain. \"And I only have one kidney.\" Also notes urgency with urination. \"I've only taken one full day of the antibiotic so far.\"  Does not feel worse than when she was in the ED.   Recommendations/Instructions  Patient notified of lab result and treatment recommendation.   Advised to follow up with her PCP as directed by the ED provider.   The patient is comfortable with the information given and has no further questions.     Contact your PCP clinic or return to the Emergency department if your:    Symptoms worsen or other concerning symptom's.    PCP follow-up Questions asked: YES       [RN Name]  Rosangela Barrera RN  [facility] [facility] RN  Lung Nodule and ED Lab Result RN  Epic pool (ED late result f/u RN): [id number]  FV INCIDENTAL RADIOLOGY F/U NURSES: P [id number]  # [phone]      Copy of Lab result   Urine Culture Aerobic Bacterial [[id number]] (Order [id number])   Order Requisition     Urine Culture Aerobic Bacterial (Order #[id number]) on 3/9/20    Exam Information     Exam Date  Exam Time  Accession #  Results     3/9/20   9:26 AM  [id number]     Component Results     Specimen Information:  Midstream Urine          Component  Collected  Lab    Specimen Description  03/09/2020  9:26 AM  225    Midstream Urine    Special Requests  03/09/2020  9:26 AM  225    Specimen received in preservative    Culture Micro Abnormal    " 03/09/2020  9:26 AM  225    >100,000 colonies/mL   Escherichia coli    Susceptibility     Escherichia coli     Antibiotic  Interpretation  Sensitivity  Method  Status    AMPICILLIN  Sensitive  <=2  ug/mL  GARETH  Final    AMPICILLIN/SULBACTAM  Sensitive  <=2  ug/mL  GARETH  Final    CEFAZOLIN  Sensitive  <=4  ug/mL  GARETH  Final     Cefazolin GARETH breakpoints are for the treatment of uncomplicated urinary tract    infections.  For the treatment of systemic infections, please contact the   laboratory for additional testing.    CEFEPIME  Sensitive  <=1  ug/mL  GARETH  Final    CEFOXITIN  Sensitive  <=4  ug/mL  GARETH  Final    CEFTAZIDIME  Sensitive  <=1  ug/mL  GARETH  Final    CEFTRIAXONE  Sensitive  <=1  ug/mL  GARETH  Final    CIPROFLOXACIN  Sensitive  <=0.25  ug/mL  GARETH  Final    GENTAMICIN  Sensitive  <=1  ug/mL  GARETH  Final    LEVOFLOXACIN  Sensitive  <=0.12  ug/mL  GARETH  Final    NITROFURANTOIN  Sensitive  <=16  ug/mL  GARETH  Final    Piperacillin/Tazo  Sensitive  <=4  ug/mL  GARETH  Final    TOBRAMYCIN  Sensitive  <=1  ug/mL  GARETH  Final    Trimethoprim/Sulfa  Sensitive  <=1/19  ug/mL  GARETH  Final

## 2020-03-15 ENCOUNTER — HEALTH MAINTENANCE LETTER (OUTPATIENT)
Age: 65
End: 2020-03-15

## 2020-03-23 ENCOUNTER — VIRTUAL VISIT (OUTPATIENT)
Dept: FAMILY MEDICINE | Facility: CLINIC | Age: 65
End: 2020-03-23
Payer: COMMERCIAL

## 2020-03-23 DIAGNOSIS — N30.00 ACUTE CYSTITIS WITHOUT HEMATURIA: Primary | ICD-10-CM

## 2020-03-23 PROCEDURE — 99441 ZZC PHYSICIAN TELEPHONE EVALUATION 5-10 MIN: CPT | Performed by: FAMILY MEDICINE

## 2020-03-23 RX ORDER — SULFAMETHOXAZOLE/TRIMETHOPRIM 800-160 MG
1 TABLET ORAL 2 TIMES DAILY
Qty: 6 TABLET | Refills: 0 | Status: SHIPPED | OUTPATIENT
Start: 2020-03-23 | End: 2020-07-27

## 2020-03-23 ASSESSMENT — PAIN SCALES - GENERAL: PAINLEVEL: MODERATE PAIN (4)

## 2020-03-23 NOTE — PROGRESS NOTES
"Diana Buenrostro is a 64 year old female who is being evaluated via a billable telephone visit.      The patient has been notified of following:     \"This telephone visit will be conducted via a call between you and your physician/provider. We have found that certain health care needs can be provided without the need for a physical exam.  This service lets us provide the care you need with a short phone conversation.  If a prescription is necessary we can send it directly to your pharmacy.  If lab work is needed we can place an order for that and you can then stop by our lab to have the test done at a later time.    If during the course of the call the physician/provider feels a telephone visit is not appropriate, you will not be charged for this service.\"     Diana Buenrostro complains of  No chief complaint on file.      I have reviewed and updated the patient's Past Medical History, Social History, Family History and Medication List.    ALLERGIES  Codeine and Serzone [nefazodone hydrochloride]    Chief Complaint   Patient presents with     RECHECK     UTI     -She finished a 7 day course of antibiotics (cephalexin 500 mg 4 times daily) on Tuesday March 17th and symptoms seems to clear up. She is now experiencing reoccurring symptoms that started yesterday.  Pressure  Frequency, burning   Additional provider notes:  Symptoms include:    Assessment/Plan:  1. Acute cystitis without hematuria    - sulfamethoxazole-trimethoprim (BACTRIM DS) 800-160 MG tablet; Take 1 tablet by mouth 2 times daily  Dispense: 6 tablet; Refill: 0    Phone call duration: 8  minutes    Shorty Clinton MD    "

## 2020-04-01 DIAGNOSIS — E03.9 HYPOTHYROIDISM, UNSPECIFIED TYPE: ICD-10-CM

## 2020-04-01 RX ORDER — LEVOTHYROXINE SODIUM 50 UG/1
TABLET ORAL
Qty: 30 TABLET | Refills: 0 | Status: SHIPPED | OUTPATIENT
Start: 2020-04-01 | End: 2020-06-02

## 2020-06-01 DIAGNOSIS — E03.9 HYPOTHYROIDISM, UNSPECIFIED TYPE: ICD-10-CM

## 2020-06-02 RX ORDER — LEVOTHYROXINE SODIUM 50 UG/1
TABLET ORAL
Qty: 30 TABLET | Refills: 0 | Status: SHIPPED | OUTPATIENT
Start: 2020-06-02 | End: 2020-06-26

## 2020-06-02 NOTE — TELEPHONE ENCOUNTER
Medication is being filled for 1 time refill only due to:  Patient needs labs thyroid, lipids, bmp. Future labs ordered yes.   Sixto Rosa RN

## 2020-07-23 DIAGNOSIS — E03.9 HYPOTHYROIDISM, UNSPECIFIED TYPE: ICD-10-CM

## 2020-07-23 RX ORDER — LEVOTHYROXINE SODIUM 50 UG/1
TABLET ORAL
Qty: 30 TABLET | Refills: 0 | Status: SHIPPED | OUTPATIENT
Start: 2020-07-23 | End: 2020-07-27

## 2020-07-23 NOTE — TELEPHONE ENCOUNTER
Please call patient hasnot had tsh in 2 years and she is also due for an office visit . Please help jenifer schedule will refill one month. Diana Dexter M.D.;

## 2020-07-27 ENCOUNTER — OFFICE VISIT (OUTPATIENT)
Dept: FAMILY MEDICINE | Facility: CLINIC | Age: 65
End: 2020-07-27
Payer: COMMERCIAL

## 2020-07-27 VITALS
DIASTOLIC BLOOD PRESSURE: 83 MMHG | HEART RATE: 63 BPM | WEIGHT: 170 LBS | BODY MASS INDEX: 25.76 KG/M2 | OXYGEN SATURATION: 99 % | SYSTOLIC BLOOD PRESSURE: 150 MMHG | HEIGHT: 68 IN | TEMPERATURE: 96.8 F

## 2020-07-27 DIAGNOSIS — Z52.4 KIDNEY DONOR: ICD-10-CM

## 2020-07-27 DIAGNOSIS — E03.9 HYPOTHYROIDISM, UNSPECIFIED TYPE: ICD-10-CM

## 2020-07-27 DIAGNOSIS — Z00.00 ROUTINE GENERAL MEDICAL EXAMINATION AT A HEALTH CARE FACILITY: Primary | ICD-10-CM

## 2020-07-27 DIAGNOSIS — E78.5 HYPERLIPIDEMIA LDL GOAL <100: ICD-10-CM

## 2020-07-27 PROCEDURE — 99396 PREV VISIT EST AGE 40-64: CPT | Performed by: FAMILY MEDICINE

## 2020-07-27 PROCEDURE — 80048 BASIC METABOLIC PNL TOTAL CA: CPT | Performed by: FAMILY MEDICINE

## 2020-07-27 PROCEDURE — 80061 LIPID PANEL: CPT | Performed by: FAMILY MEDICINE

## 2020-07-27 PROCEDURE — 36415 COLL VENOUS BLD VENIPUNCTURE: CPT | Performed by: FAMILY MEDICINE

## 2020-07-27 PROCEDURE — 84443 ASSAY THYROID STIM HORMONE: CPT | Performed by: FAMILY MEDICINE

## 2020-07-27 RX ORDER — SIMVASTATIN 20 MG
20 TABLET ORAL AT BEDTIME
Qty: 90 TABLET | Refills: 3 | Status: SHIPPED | OUTPATIENT
Start: 2020-07-27 | End: 2020-08-04

## 2020-07-27 RX ORDER — LEVOTHYROXINE SODIUM 50 UG/1
TABLET ORAL
Qty: 90 TABLET | Refills: 0 | Status: SHIPPED | OUTPATIENT
Start: 2020-07-27 | End: 2020-08-19

## 2020-07-27 ASSESSMENT — ANXIETY QUESTIONNAIRES
2. NOT BEING ABLE TO STOP OR CONTROL WORRYING: SEVERAL DAYS
1. FEELING NERVOUS, ANXIOUS, OR ON EDGE: NOT AT ALL
5. BEING SO RESTLESS THAT IT IS HARD TO SIT STILL: NOT AT ALL
7. FEELING AFRAID AS IF SOMETHING AWFUL MIGHT HAPPEN: NOT AT ALL
6. BECOMING EASILY ANNOYED OR IRRITABLE: NOT AT ALL
GAD7 TOTAL SCORE: 2
3. WORRYING TOO MUCH ABOUT DIFFERENT THINGS: SEVERAL DAYS

## 2020-07-27 ASSESSMENT — PATIENT HEALTH QUESTIONNAIRE - PHQ9
5. POOR APPETITE OR OVEREATING: NOT AT ALL
SUM OF ALL RESPONSES TO PHQ QUESTIONS 1-9: 1

## 2020-07-27 ASSESSMENT — MIFFLIN-ST. JEOR: SCORE: 1374.61

## 2020-07-27 NOTE — PATIENT INSTRUCTIONS

## 2020-07-28 LAB
ANION GAP SERPL CALCULATED.3IONS-SCNC: 5 MMOL/L (ref 3–14)
BUN SERPL-MCNC: 18 MG/DL (ref 7–30)
CALCIUM SERPL-MCNC: 8.9 MG/DL (ref 8.5–10.1)
CHLORIDE SERPL-SCNC: 107 MMOL/L (ref 94–109)
CHOLEST SERPL-MCNC: 195 MG/DL
CO2 SERPL-SCNC: 28 MMOL/L (ref 20–32)
CREAT SERPL-MCNC: 1.14 MG/DL (ref 0.52–1.04)
GFR SERPL CREATININE-BSD FRML MDRD: 51 ML/MIN/{1.73_M2}
GLUCOSE SERPL-MCNC: 84 MG/DL (ref 70–99)
HDLC SERPL-MCNC: 67 MG/DL
LDLC SERPL CALC-MCNC: 112 MG/DL
NONHDLC SERPL-MCNC: 128 MG/DL
POTASSIUM SERPL-SCNC: 4.7 MMOL/L (ref 3.4–5.3)
SODIUM SERPL-SCNC: 140 MMOL/L (ref 133–144)
TRIGL SERPL-MCNC: 82 MG/DL
TSH SERPL DL<=0.005 MIU/L-ACNC: 1.6 MU/L (ref 0.4–4)

## 2020-07-28 ASSESSMENT — ANXIETY QUESTIONNAIRES: GAD7 TOTAL SCORE: 2

## 2020-08-04 DIAGNOSIS — E78.5 HYPERLIPIDEMIA LDL GOAL <100: ICD-10-CM

## 2020-08-04 RX ORDER — SIMVASTATIN 20 MG
20 TABLET ORAL AT BEDTIME
Qty: 90 TABLET | Refills: 3 | Status: SHIPPED | OUTPATIENT
Start: 2020-08-04

## 2020-08-18 DIAGNOSIS — E03.9 HYPOTHYROIDISM, UNSPECIFIED TYPE: ICD-10-CM

## 2020-08-19 RX ORDER — LEVOTHYROXINE SODIUM 50 UG/1
TABLET ORAL
Qty: 90 TABLET | Refills: 3 | Status: SHIPPED | OUTPATIENT
Start: 2020-08-19 | End: 2021-01-12

## 2020-09-08 NOTE — PROGRESS NOTES
SUBJECTIVE:   CC: Diana Buenrostro is an 64 year old woman who presents for preventive health visit.     Healthy Habits:    Do you get at least three servings of calcium containing foods daily (dairy, green leafy vegetables, etc.)? yes    Amount of exercise or daily activities, outside of work: 5 day(s) per week    Problems taking medications regularly No    Medication side effects: No    Have you had an eye exam in the past two years? no    Do you see a dentist twice per year? no    Do you have sleep apnea, excessive snoring or daytime drowsiness?no    BP Readings from Last 6 Encounters:   20 (!) 150/83   20 (!) 132/92   19 136/77   19 (!) 147/93   19 139/88   19 119/70     Patient is a kidney donor need cr checked no c/o    Today's PHQ-2 Score:   PHQ-2 (  [employer]) 2020   Q1: Little interest or pleasure in doing things 0 2   Q2: Feeling down, depressed or hopeless 0 2   PHQ-2 Score 0 4       Abuse: Current or Past(Physical, Sexual or Emotional)- No  Do you feel safe in your environment? Yes    Have you ever done Advance Care Planning? (For example, a Health Directive, POLST, or a discussion with a medical provider or your loved ones about your wishes): No, advance care planning information given to patient to review.  Advanced care planning was discussed at today's visit.    Social History     Tobacco Use     Smoking status: Former Smoker     Years: 15.00     Last attempt to quit: 1995     Years since quittin.5     Smokeless tobacco: Never Used   Substance Use Topics     Alcohol use: No     If you drink alcohol do you typically have >3 drinks per day or >7 drinks per week? No                     Reviewed orders with patient.  Reviewed health maintenance and updated orders accordingly - Yes  Lab work is in process    Mammogram Screening: Patient over age 50, mutual decision to screen reflected in health maintenance.    Pertinent mammograms are  Follow Up Visit Note       Active Problems      No diagnosis found. Chief Complaint   Patient presents with:  Hemorrhoid Banding        History of Present Illness        Allergies  Aby Rosado has No Known Allergies.     Past Medical / Surgical / Social / during today. Review of Systems   Constitutional: Negative for chills, diaphoresis, fatigue, fever and unexpected weight change. HENT: Negative for hearing loss, nosebleeds, sore throat and trouble swallowing.     Respiratory: Negative for apnea, cough, reviewed under the imaging tab.  History of abnormal Pap smear: Status post benign hysterectomy. Health Maintenance and Surgical History updated.  PAP / HPV 10/19/2015 2/6/2006   PAP NIL NIL     Reviewed and updated as needed this visit by clinical staff  Tobacco  Allergies         Reviewed and updated as needed this visit by Provider        Past Medical History:   Diagnosis Date     Anxiety state, unspecified      Chronic kidney disease (CKD) 11/28/2008     2006 cr 1.11 6/13/06 cr 0.94     Depressive disorder, not elsewhere classified      Insomnia, unspecified      Other specified anemias     History of anemia secondary to iron deficiency and menorrhagia     PONV (postoperative nausea and vomiting)      Thyroid disease      Unspecified allergic alveolitis and pneumonitis     atypical     Unspecified hearing loss     right      Urinary tract infection, site not specified       Past Surgical History:   Procedure Laterality Date     ARTHROSCOPY KNEE WITH MEDIAL MENISCECTOMY Right 1/22/2015    Procedure: ARTHROSCOPY KNEE WITH MEDIAL MENISCECTOMY;  Surgeon: Alberto Mason MD;  Location: [facility] OR     HYSTERECTOMY, VAGINAL  2000    Partial Hysterectomy for menorrhagia     LAPAROSCOPIC CHOLECYSTECTOMY  4/9/2014    Procedure: LAPAROSCOPIC CHOLECYSTECTOMY;  Laparoscopic vs Open Cholecystectomy;  Surgeon: Kamille Hilliard MD;  Location: [facility] OR     laparoscopic wedge resection of right lung  2010     SURGICAL HISTORY OF -   6/14/2006    Laparoscopic and hand-assisted left donor ureteronephrectomy       ROS:  CONSTITUTIONAL: NEGATIVE for fever, chills, change in weight  INTEGUMENTARY/SKIN: NEGATIVE for worrisome rashes, moles or lesions  EYES: NEGATIVE for vision changes or irritation  ENT: NEGATIVE for ear, mouth and throat problems  RESP: NEGATIVE for significant cough or SOB  BREAST: NEGATIVE for masses, tenderness or discharge  CV: NEGATIVE for chest pain, palpitations or peripheral edema  GI: NEGATIVE for  "nausea, abdominal pain, heartburn, or change in bowel habits  : NEGATIVE for unusual urinary or vaginal symptoms. No vaginal bleeding.  MUSCULOSKELETAL: NEGATIVE for significant arthralgias or myalgia  NEURO: NEGATIVE for weakness, dizziness or paresthesias  PSYCHIATRIC: NEGATIVE for changes in mood or affect     OBJECTIVE:   BP (!) 150/83   Pulse 63   Temp 96.8  F (36  C) (Tympanic)   Ht 1.727 m (5' 8\")   Wt 77.1 kg (170 lb)   SpO2 99%   BMI 25.85 kg/m    EXAM:  GENERAL APPEARANCE: healthy, alert and no distress  HENT: ear canals and TM's normal, nose and mouth without ulcers or lesions, oropharynx clear and oral mucous membranes moist  NECK: no adenopathy, no asymmetry, masses, or scars and thyroid normal to palpation  RESP: lungs clear to auscultation - no rales, rhonchi or wheezes  BREAST: normal without masses, tenderness or nipple discharge and no palpable axillary masses or adenopathy  CV: regular rate and rhythm, normal S1 S2, no S3 or S4, no murmur, click or rub, no peripheral edema and peripheral pulses strong  ABDOMEN: soft, nontender, no hepatosplenomegaly, no masses and bowel sounds normal   (female): normal female external genitalia, normal urethral meatus, vaginal mucosal atrophy noted and there is no cervix or uterus   MS: no musculoskeletal defects are noted and gait is age appropriate without ataxia  SKIN: no suspicious lesions or rashes  NEURO: Normal strength and tone, sensory exam grossly normal, mentation intact and speech normal  PSYCH: mentation appears normal and affect normal/bright    Diagnostic Test Results:  No results found for this or any previous visit (from the past 24 hour(s)).    ASSESSMENT/PLAN:   1. Routine general medical examination at a health care facility  Patient no longer needs pap she has no cervix     2. Hyperlipidemia LDL goal <100  Recheck   - simvastatin (ZOCOR) 20 MG tablet; Take 1 tablet (20 mg) by mouth At Bedtime OK to fill today Needs fasting lab before " "further refills  Dispense: 90 tablet; Refill: 3  - Lipid panel reflex to direct LDL Fasting    3. Hypothyroidism, unspecified type  Recheck   - levothyroxine (SYNTHROID/LEVOTHROID) 50 MCG tablet; Take 1 by  Mouth on an empty stomach  Dispense: 90 tablet; Refill: 0  - TSH with free T4 reflex    4. Kidney donor  jordyn check CR  - Basic metabolic panel  (Ca, Cl, CO2, Creat, Gluc, K, Na, BUN)    COUNSELING:   Reviewed preventive health counseling, as reflected in patient instructions    Estimated body mass index is 25.85 kg/m  as calculated from the following:    Height as of this encounter: 1.727 m (5' 8\").    Weight as of this encounter: 77.1 kg (170 lb).         reports that she quit smoking about 25 years ago. She quit after 15.00 years of use. She has never used smokeless tobacco.      Counseling Resources:  ATP IV Guidelines  Pooled Cohorts Equation Calculator  Breast Cancer Risk Calculator  FRAX Risk Assessment  ICSI Preventive Guidelines  Dietary Guidelines for Americans, 2010  USDA's MyPlate  ASA Prophylaxis  Lung CA Screening    Diana Dexter MD  [facility]  "

## 2021-01-09 ENCOUNTER — HEALTH MAINTENANCE LETTER (OUTPATIENT)
Age: 66
End: 2021-01-09

## 2021-01-11 DIAGNOSIS — E03.9 HYPOTHYROIDISM, UNSPECIFIED TYPE: ICD-10-CM

## 2021-01-12 RX ORDER — LEVOTHYROXINE SODIUM 50 UG/1
TABLET ORAL
Qty: 90 TABLET | Refills: 1 | Status: SHIPPED | OUTPATIENT
Start: 2021-01-12

## 2021-01-12 NOTE — TELEPHONE ENCOUNTER
Prescription approved per [facility] [place].  Sixto Rosa RN

## 2021-05-08 ENCOUNTER — HEALTH MAINTENANCE LETTER (OUTPATIENT)
Age: 66
End: 2021-05-08

## 2021-08-28 ENCOUNTER — HEALTH MAINTENANCE LETTER (OUTPATIENT)
Age: 66
End: 2021-08-28

## 2021-10-19 PROBLEM — F32.9 MAJOR DEPRESSION: Status: ACTIVE | Noted: 2017-08-01

## 2021-10-23 ENCOUNTER — HEALTH MAINTENANCE LETTER (OUTPATIENT)
Age: 66
End: 2021-10-23

## 2021-12-18 ENCOUNTER — HEALTH MAINTENANCE LETTER (OUTPATIENT)
Age: 66
End: 2021-12-18

## 2022-10-09 ENCOUNTER — HEALTH MAINTENANCE LETTER (OUTPATIENT)
Age: 67
End: 2022-10-09

## 2023-02-12 ENCOUNTER — HEALTH MAINTENANCE LETTER (OUTPATIENT)
Age: 68
End: 2023-02-12

## 2023-05-21 ENCOUNTER — HEALTH MAINTENANCE LETTER (OUTPATIENT)
Age: 68
End: 2023-05-21

## 2023-06-01 ENCOUNTER — PATIENT OUTREACH (OUTPATIENT)
Dept: GASTROENTEROLOGY | Facility: CLINIC | Age: 68
End: 2023-06-01
Payer: COMMERCIAL

## 2023-06-01 DIAGNOSIS — Z12.11 SPECIAL SCREENING FOR MALIGNANT NEOPLASMS, COLON: Primary | ICD-10-CM

## 2023-06-01 NOTE — PROGRESS NOTES
"Ordering/Referring Provider: Winsome Cast    BMI: Estimated body mass index is 25.85 kg/m  as calculated from the following:    Height as of 7/27/20: 1.727 m (5' 8\").    Weight as of 7/27/20: 77.1 kg (170 lb).     Sedation:  Based on patients medical history patient is appropriate for   moderate sedation. If patient's BMI > 50 do not schedule in ASC.    Location:  Does patient have an LVAD?  No    Does patient have a history of moderate to severe sleep apnea?  No    Does patient have a history of asthma, COPD or any other lung disease?  No    Does patient have a history of cardiac disease?  No    Is pataient awaiting a heart or lung transplant?   No    Has patient had a stroke or transient ischemic attack in the last 6 months?   No    Is the patient currently on dialysis?   No    Prep:  Previous prep (last colonoscopy):   could not locate    Quality of previous prep:   Excellent    Is patient currently on dialysis, is ESRD, or CKD stage 4/5?   No (standard prep)    Does patient have a diagnosis of diabetes?  No    Does patient have a diagnosis of cystic fibrosis (CF)?  No    BMI > 40?  No    Final Referral Status:  meets the criteria for placement of colonoscopy screening  referral order.      Referral order placed with the following recommendations:  Sedation: Moderate Sedation  Location Type: ASC  Prep: MiraLAX (No Mag Citrate)        "

## 2024-03-16 ENCOUNTER — HEALTH MAINTENANCE LETTER (OUTPATIENT)
Age: 69
End: 2024-03-16

## 2025-05-03 ENCOUNTER — HEALTH MAINTENANCE LETTER (OUTPATIENT)
Age: 70
End: 2025-05-03

## (undated) RX ORDER — IOPAMIDOL 612 MG/ML
INJECTION, SOLUTION INTRATHECAL
Status: DISPENSED
Start: 2017-12-13

## (undated) RX ORDER — METHYLPREDNISOLONE ACETATE 40 MG/ML
INJECTION, SUSPENSION INTRA-ARTICULAR; INTRALESIONAL; INTRAMUSCULAR; SOFT TISSUE
Status: DISPENSED
Start: 2017-12-13

## (undated) RX ORDER — LIDOCAINE HYDROCHLORIDE 10 MG/ML
INJECTION, SOLUTION EPIDURAL; INFILTRATION; INTRACAUDAL; PERINEURAL
Status: DISPENSED
Start: 2019-02-26